# Patient Record
Sex: MALE | Race: WHITE | NOT HISPANIC OR LATINO | Employment: UNEMPLOYED | URBAN - NONMETROPOLITAN AREA
[De-identification: names, ages, dates, MRNs, and addresses within clinical notes are randomized per-mention and may not be internally consistent; named-entity substitution may affect disease eponyms.]

---

## 2017-03-22 ENCOUNTER — HOSPITAL ENCOUNTER (EMERGENCY)
Facility: HOSPITAL | Age: 47
Discharge: HOME | End: 2017-03-22
Attending: EMERGENCY MEDICINE | Admitting: EMERGENCY MEDICINE

## 2017-03-22 ENCOUNTER — APPOINTMENT (OUTPATIENT)
Dept: ULTRASOUND IMAGING | Facility: HOSPITAL | Age: 47
End: 2017-03-22

## 2017-03-22 VITALS
SYSTOLIC BLOOD PRESSURE: 164 MMHG | HEIGHT: 66 IN | OXYGEN SATURATION: 96 % | WEIGHT: 125 LBS | BODY MASS INDEX: 20.09 KG/M2 | HEART RATE: 120 BPM | TEMPERATURE: 97.6 F | DIASTOLIC BLOOD PRESSURE: 98 MMHG | RESPIRATION RATE: 18 BRPM

## 2017-03-22 DIAGNOSIS — R36.9 PENILE DISCHARGE: Primary | ICD-10-CM

## 2017-03-22 DIAGNOSIS — R30.0 DYSURIA: ICD-10-CM

## 2017-03-22 LAB
BACTERIA UR QL AUTO: ABNORMAL /HPF
BILIRUB UR QL STRIP: NEGATIVE
CLARITY UR: CLEAR
COLOR UR: YELLOW
GLUCOSE UR STRIP-MCNC: NEGATIVE MG/DL
HGB UR QL STRIP.AUTO: ABNORMAL
HYALINE CASTS UR QL AUTO: ABNORMAL /LPF
KETONES UR QL STRIP: NEGATIVE
LEUKOCYTE ESTERASE UR QL STRIP.AUTO: ABNORMAL
NITRITE UR QL STRIP: POSITIVE
PH UR STRIP.AUTO: 6 [PH] (ref 5–8)
PROT UR QL STRIP: ABNORMAL
RBC # UR: ABNORMAL /HPF
REF LAB TEST METHOD: ABNORMAL
SP GR UR STRIP: 1.02 (ref 1–1.03)
SQUAMOUS #/AREA URNS HPF: ABNORMAL /HPF
UROBILINOGEN UR QL STRIP: ABNORMAL
WBC UR QL AUTO: ABNORMAL /HPF

## 2017-03-22 PROCEDURE — 25010000002 CEFTRIAXONE PER 250 MG: Performed by: EMERGENCY MEDICINE

## 2017-03-22 PROCEDURE — 87186 SC STD MICRODIL/AGAR DIL: CPT | Performed by: EMERGENCY MEDICINE

## 2017-03-22 PROCEDURE — 81001 URINALYSIS AUTO W/SCOPE: CPT | Performed by: EMERGENCY MEDICINE

## 2017-03-22 PROCEDURE — 87491 CHLMYD TRACH DNA AMP PROBE: CPT | Performed by: EMERGENCY MEDICINE

## 2017-03-22 PROCEDURE — 99283 EMERGENCY DEPT VISIT LOW MDM: CPT

## 2017-03-22 PROCEDURE — 96372 THER/PROPH/DIAG INJ SC/IM: CPT

## 2017-03-22 PROCEDURE — 87591 N.GONORRHOEAE DNA AMP PROB: CPT | Performed by: EMERGENCY MEDICINE

## 2017-03-22 PROCEDURE — 87077 CULTURE AEROBIC IDENTIFY: CPT | Performed by: EMERGENCY MEDICINE

## 2017-03-22 PROCEDURE — 76870 US EXAM SCROTUM: CPT

## 2017-03-22 PROCEDURE — 51701 INSERT BLADDER CATHETER: CPT

## 2017-03-22 PROCEDURE — 87086 URINE CULTURE/COLONY COUNT: CPT | Performed by: EMERGENCY MEDICINE

## 2017-03-22 RX ORDER — LIDOCAINE HYDROCHLORIDE 10 MG/ML
0.9 INJECTION, SOLUTION EPIDURAL; INFILTRATION; INTRACAUDAL; PERINEURAL ONCE
Status: DISCONTINUED | OUTPATIENT
Start: 2017-03-22 | End: 2017-03-22

## 2017-03-22 RX ORDER — CIPROFLOXACIN 500 MG/1
500 TABLET, FILM COATED ORAL 2 TIMES DAILY
Qty: 28 TABLET | Refills: 0 | Status: SHIPPED | OUTPATIENT
Start: 2017-03-22 | End: 2017-04-05

## 2017-03-22 RX ORDER — CEFTRIAXONE SODIUM 250 MG/1
250 INJECTION, POWDER, FOR SOLUTION INTRAMUSCULAR; INTRAVENOUS ONCE
Status: COMPLETED | OUTPATIENT
Start: 2017-03-22 | End: 2017-03-22

## 2017-03-22 RX ORDER — IBUPROFEN 800 MG/1
400 TABLET ORAL EVERY 8 HOURS PRN
Qty: 30 TABLET | Refills: 0 | Status: ON HOLD | OUTPATIENT
Start: 2017-03-22 | End: 2021-04-25

## 2017-03-22 RX ORDER — TRAZODONE HYDROCHLORIDE 100 MG/1
100 TABLET ORAL NIGHTLY
Status: ON HOLD | COMMUNITY
End: 2021-04-25

## 2017-03-22 RX ORDER — AZITHROMYCIN 250 MG/1
1000 TABLET, FILM COATED ORAL ONCE
Status: COMPLETED | OUTPATIENT
Start: 2017-03-22 | End: 2017-03-22

## 2017-03-22 RX ADMIN — AZITHROMYCIN 1000 MG: 250 TABLET, FILM COATED ORAL at 07:33

## 2017-03-22 RX ADMIN — CEFTRIAXONE SODIUM 250 MG: 250 INJECTION, POWDER, FOR SOLUTION INTRAMUSCULAR; INTRAVENOUS at 07:32

## 2017-03-22 NOTE — DISCHARGE INSTRUCTIONS

## 2017-03-22 NOTE — ED PROVIDER NOTES
"Subjective   HPI Comments: 46-year-old male presenting with multiple complaints.  He states for the last few days she's had right testicle pain/swelling, dysuria, penile discharge.  He had a new sexual partner who he thinks has given him gonorrhea.  He denies any fevers, chills, nausea, vomiting.  He has a history of testicular cancer status post left orchiectomy.  He is also requesting that we place a Charles catheter, swab his penis for gonorrhea and \"a prostate test\".      Review of Systems   Constitutional: Negative for chills and fever.   HENT: Negative for congestion, rhinorrhea and sore throat.    Eyes: Negative for pain.   Respiratory: Negative for cough and shortness of breath.    Cardiovascular: Negative for chest pain, palpitations and leg swelling.   Gastrointestinal: Negative for abdominal pain, diarrhea, nausea and vomiting.   Genitourinary: Positive for difficulty urinating, discharge, dysuria, scrotal swelling and testicular pain.   Musculoskeletal: Negative for arthralgias.   Skin: Negative for rash.   Neurological: Negative for weakness and numbness.   Psychiatric/Behavioral: Negative for behavioral problems.       Past Medical History:   Diagnosis Date   • Cancer     testicular       No Known Allergies    Past Surgical History:   Procedure Laterality Date   • TESTICLE SURGERY         History reviewed. No pertinent family history.    Social History     Social History   • Marital status: Single     Spouse name: N/A   • Number of children: N/A   • Years of education: N/A     Social History Main Topics   • Smoking status: Never Smoker   • Smokeless tobacco: Current User     Types: Chew   • Alcohol use No   • Drug use: No   • Sexual activity: Not Asked     Other Topics Concern   • None     Social History Narrative   • None           Objective   Physical Exam   Constitutional: He is oriented to person, place, and time. He appears well-developed and well-nourished. No distress.   HENT:   Head: " Normocephalic and atraumatic.   Right Ear: External ear normal.   Left Ear: External ear normal.   Nose: Nose normal.   Mouth/Throat: Oropharynx is clear and moist.   Eyes: Conjunctivae and EOM are normal. Pupils are equal, round, and reactive to light.   Neck: Normal range of motion. Neck supple.   Cardiovascular: Normal rate, regular rhythm, normal heart sounds and intact distal pulses.    Pulmonary/Chest: Effort normal and breath sounds normal. No respiratory distress.   Abdominal: Soft. Bowel sounds are normal. He exhibits no distension. There is no tenderness. There is no rebound and no guarding.   Genitourinary:   Genitourinary Comments: Scan amount of clear discharge from the penis, no tenderness, left testicle surgically absent, right testicle with mild tenderness, no swelling   Musculoskeletal: Normal range of motion. He exhibits no edema, tenderness or deformity.   Neurological: He is alert and oriented to person, place, and time.   Skin: Skin is warm and dry. No rash noted.   Psychiatric: He has a normal mood and affect. His behavior is normal.   Nursing note and vitals reviewed.      Procedures         ED Course  ED Course                  MDM  Number of Diagnoses or Management Options  Dysuria:   Penile discharge:   Diagnosis management comments: 46-year-old male with multiple  complaints.  Well-developed, well-nourished man in no distress with exam as above.  We'll check urinalysis and ultrasound.  We'll treat prophylactically for Chlamydia/gonorrhea.  He will need follow-up with urology.  Disposition pending ultrasound.  -ua  -u/s  -im/po meds    Ddx; uti, sti, torsion, malignancy, epididymitis     UA with signs of infection to include nitrite positive.  Ultrasound is without acute findings.  He is oriented treated for Chlamydia/gonorrhea as noted above, will place him on Cipro as well for urinary tract infection.  He'll follow-up with his primary doctor and urology.  Return precautions discussed.   He is comfortable with and understanding of plan.        Final diagnoses:   Penile discharge   Dysuria            Clemente Samuels MD  03/22/17 2830

## 2017-03-23 LAB
C TRACH RRNA SPEC DONR QL NAA+PROBE: NEGATIVE
N GONORRHOEA DNA SPEC QL NAA+PROBE: NEGATIVE

## 2017-03-24 LAB — BACTERIA SPEC AEROBE CULT: ABNORMAL

## 2017-04-04 ENCOUNTER — HOSPITAL ENCOUNTER (EMERGENCY)
Facility: HOSPITAL | Age: 47
Discharge: LEFT BEFORE DISCHARGE | End: 2017-04-04
Attending: EMERGENCY MEDICINE | Admitting: EMERGENCY MEDICINE

## 2017-04-04 VITALS
HEART RATE: 107 BPM | WEIGHT: 125 LBS | SYSTOLIC BLOOD PRESSURE: 203 MMHG | HEIGHT: 66 IN | BODY MASS INDEX: 20.09 KG/M2 | RESPIRATION RATE: 16 BRPM | DIASTOLIC BLOOD PRESSURE: 127 MMHG | OXYGEN SATURATION: 97 % | TEMPERATURE: 97.9 F

## 2017-04-04 DIAGNOSIS — R33.9 URINARY RETENTION: Primary | ICD-10-CM

## 2017-04-04 PROCEDURE — 99282 EMERGENCY DEPT VISIT SF MDM: CPT

## 2017-04-04 NOTE — ED NOTES
"During triage pt states \"is there anyone I can talk to about me wanting to be a baby, like wearing dirty diapers and wanting to suck on a bottle?\". Informed pt our behavioral health staff would be available to provide resources for pt.      Nahomy Ellis RN  04/04/17 0904    "

## 2017-04-04 NOTE — ED PROVIDER NOTES
Subjective   History of Present Illness  TRIAGE CHIEF COMPLAINT:   Chief Complaint   Patient presents with   • Urinary Retention         HPI: Stefano Claire   is a 46 y.o. male   who presents to the emergency department complaining of urinary retention.  Patient states this is a chronic issue ever since he had testicular cancer.  He intermittently self catheterizes however ran out of his catheters.  Patient feels bladder fullness and states he does not believe he is completely voiding.  Requests Charles with leg bag and states he will follow-up with his doctor in clinic.  Denies dysuria, hematuria, fever, chills.  Apparently made some comments to the nurse about psychiatric concerns however he was not open about this with me.            Review of Systems   All other systems reviewed and are negative.      Past Medical History:   Diagnosis Date   • Cancer     testicular       No Known Allergies    Past Surgical History:   Procedure Laterality Date   • TESTICLE SURGERY         History reviewed. No pertinent family history.    Social History     Social History   • Marital status: Single     Spouse name: N/A   • Number of children: N/A   • Years of education: N/A     Social History Main Topics   • Smoking status: Never Smoker   • Smokeless tobacco: Current User     Types: Chew   • Alcohol use No   • Drug use: No   • Sexual activity: Not Asked     Other Topics Concern   • None     Social History Narrative           Objective   Physical Exam    CONSTITUTIONAL: Awake, oriented, appears disheveled with bizarre affect but overall non-toxic   HENT: Atraumatic, normocephalic, oral mucosa pink and moist, airway patent. Nares patent without drainage. External ears normal.   EYES: Conjunctiva clear, EOMI, PERRL   NECK: Trachea midline, non-tender, supple   CARDIOVASCULAR: Normal heart rate, Normal rhythm, No murmurs, rubs, gallops   PULMONARY/CHEST: Clear to auscultation, no rhonchi, wheezes, or rales. Symmetrical breath  sounds. Non-tender.   ABDOMINAL: Non-distended, soft, very mild suprapubic tenderness - no rebound or guarding. BS normal.   NEUROLOGIC: Non-focal, moving all four extremities, no gross sensory or motor deficits.   EXTREMITIES: No clubbing, cyanosis, or edema   SKIN: Warm, Dry, No erythema, No rash     No orders to display           EKG:         Procedures         ED Course  ED Course          ED COURSE / MEDICAL DECISION MAKING:   Patient with bizarre affect.  At first requested Charles catheter placement and leg bag but then declined any intervention.  Patient left the ER without instructions or referral information.    DECISION TO DISCHARGE/ADMIT: see ED care timeline       Electronically signed by: Juanito Carrasco MD, 4/4/2017 10:27 AM              [facility]    Final diagnoses:   Urinary retention            Juanito Carrasco MD  04/04/17 1030

## 2017-10-15 ENCOUNTER — HOSPITAL ENCOUNTER (EMERGENCY)
Facility: HOSPITAL | Age: 47
Discharge: HOME | End: 2017-10-15
Attending: EMERGENCY MEDICINE | Admitting: EMERGENCY MEDICINE

## 2017-10-15 VITALS
HEIGHT: 66 IN | WEIGHT: 125 LBS | SYSTOLIC BLOOD PRESSURE: 202 MMHG | HEART RATE: 107 BPM | BODY MASS INDEX: 20.09 KG/M2 | TEMPERATURE: 98.3 F | RESPIRATION RATE: 20 BRPM | OXYGEN SATURATION: 98 % | DIASTOLIC BLOOD PRESSURE: 119 MMHG

## 2017-10-15 DIAGNOSIS — I10 UNCONTROLLED HYPERTENSION: ICD-10-CM

## 2017-10-15 DIAGNOSIS — R32 URINARY INCONTINENCE, UNSPECIFIED TYPE: Primary | ICD-10-CM

## 2017-10-15 PROCEDURE — 99282 EMERGENCY DEPT VISIT SF MDM: CPT

## 2017-10-15 NOTE — ED NOTES
Refused UA. Patient requesting paperwork so that he can leave, unable to reassess vital signs prior to leaving at discharge.      Angel Alatorre RN  10/15/17 5071

## 2017-10-15 NOTE — ED PROVIDER NOTES
Subjective   History of Present Illness  TRIAGE CHIEF COMPLAINT:   Chief Complaint   Patient presents with   • Male  Problem         HPI: Stefano Claire   is a 47 y.o. male   who presents to the emergency department complaining of Urinary incontinence.  Patient states he has suffered from urinary incontinence for years ever since his testicular cancer surgery.  Patient states he is tired of having to wear adult diapers and is requesting indwelling Charles catheter.  He has a poor historian.  States he is followed by urology at  but cannot remember the doctor's name.  PCP is Dr. Keita.  Denies fever, chills, dysuria, hematuria, abdominal pain.           Review of Systems   All other systems reviewed and are negative.      Past Medical History:   Diagnosis Date   • Cancer     testicular       No Known Allergies    Past Surgical History:   Procedure Laterality Date   • TESTICLE SURGERY         History reviewed. No pertinent family history.    Social History     Social History   • Marital status: Single     Spouse name: N/A   • Number of children: N/A   • Years of education: N/A     Social History Main Topics   • Smoking status: Never Smoker   • Smokeless tobacco: Current User     Types: Chew   • Alcohol use No   • Drug use: No   • Sexual activity: Not Asked     Other Topics Concern   • None     Social History Narrative   • None           Objective   Physical Exam    CONSTITUTIONAL: Awake, oriented, appears anxious/jittery but non-toxic   HENT: Atraumatic, normocephalic, oral mucosa pink and moist, airway patent.    EYES: Conjunctiva clear   PULMONARY/CHEST: no respiratory distress or audible wheezing   ABDOMINAL: Non-distended   NEUROLOGIC: Non-focal, moving all four extremities.    SKIN: Dry, No erythema or cyanosis     No orders to display           EKG:         Procedures         ED Course  ED Course          ED COURSE / MEDICAL DECISION MAKING:   Nursing notes reviewed.    In our initial discussion while  taking the history of present illness advised the patient that most likely we would not be initiating permanent indwelling Charles catheter in the emergency department and that this is a decision he should make in conjunction with his urologist because he would need follow-up, catheter care education, and replacements.  Patient is quite anxious and has a bizarre affect.  My concern is that he would be lost to follow-up in would leave the catheter in for to long or end up with subsequent infection and possible urosepsis.  He seems unreliable.  Last time he was in the emergency department with similar complaints 6 months ago he left AMA shortly after arrival.  On this occasion he declined physical exam, urinalysis, and states that if we would not be placing a Charles catheter he was going to leave the emergency department and go home.  Patient was encouraged to follow up with his PCP about uncontrolled hypertension as well as his urologist regarding his concerns about urinary incontinence.     DECISION TO DISCHARGE/ADMIT: see ED care timeline       Electronically signed by: Juanito Carrasco MD, 10/15/2017 7:50 AM              [facility]    Final diagnoses:   Urinary incontinence, unspecified type   Uncontrolled hypertension            Juanito Carrasco MD  10/15/17 0802

## 2017-11-18 ENCOUNTER — HOSPITAL ENCOUNTER (EMERGENCY)
Facility: HOSPITAL | Age: 47
Discharge: HOME | End: 2017-11-18
Attending: EMERGENCY MEDICINE | Admitting: EMERGENCY MEDICINE

## 2017-11-18 VITALS
TEMPERATURE: 97.8 F | BODY MASS INDEX: 20.09 KG/M2 | SYSTOLIC BLOOD PRESSURE: 146 MMHG | DIASTOLIC BLOOD PRESSURE: 97 MMHG | RESPIRATION RATE: 18 BRPM | OXYGEN SATURATION: 97 % | HEIGHT: 66 IN | HEART RATE: 79 BPM | WEIGHT: 125 LBS

## 2017-11-18 DIAGNOSIS — F32.A ANXIETY AND DEPRESSION: Primary | ICD-10-CM

## 2017-11-18 DIAGNOSIS — F41.9 ANXIETY AND DEPRESSION: Primary | ICD-10-CM

## 2017-11-18 LAB
ALBUMIN SERPL-MCNC: 4.6 G/DL (ref 3.5–5)
ALBUMIN/GLOB SERPL: 1.7 G/DL (ref 1–2)
ALP SERPL-CCNC: 113 U/L (ref 38–126)
ALT SERPL W P-5'-P-CCNC: 45 U/L (ref 13–69)
AMPHET+METHAMPHET UR QL: NEGATIVE
AMPHETAMINES UR QL: NEGATIVE
ANION GAP SERPL CALCULATED.3IONS-SCNC: 16.7 MMOL/L
APAP SERPL-MCNC: <10 MCG/ML
AST SERPL-CCNC: 30 U/L (ref 15–46)
BACTERIA UR QL AUTO: ABNORMAL /HPF
BARBITURATES UR QL SCN: NEGATIVE
BASOPHILS # BLD AUTO: 0.07 10*3/MM3 (ref 0–0.2)
BASOPHILS NFR BLD AUTO: 0.8 % (ref 0–2.5)
BENZODIAZ UR QL SCN: NEGATIVE
BILIRUB SERPL-MCNC: 0.2 MG/DL (ref 0.2–1.3)
BILIRUB UR QL STRIP: NEGATIVE
BUN BLD-MCNC: 14 MG/DL (ref 7–20)
BUN/CREAT SERPL: 17.5 (ref 6.3–21.9)
BUPRENORPHINE SERPL-MCNC: NEGATIVE NG/ML
CALCIUM SPEC-SCNC: 9.3 MG/DL (ref 8.4–10.2)
CANNABINOIDS SERPL QL: NEGATIVE
CHLORIDE SERPL-SCNC: 104 MMOL/L (ref 98–107)
CLARITY UR: CLEAR
CO2 SERPL-SCNC: 24 MMOL/L (ref 26–30)
COCAINE UR QL: NEGATIVE
COLOR UR: YELLOW
CREAT BLD-MCNC: 0.8 MG/DL (ref 0.6–1.3)
DEPRECATED RDW RBC AUTO: 42.7 FL (ref 37–54)
EOSINOPHIL # BLD AUTO: 0.02 10*3/MM3 (ref 0–0.7)
EOSINOPHIL NFR BLD AUTO: 0.2 % (ref 0–7)
ERYTHROCYTE [DISTWIDTH] IN BLOOD BY AUTOMATED COUNT: 13.2 % (ref 11.5–14.5)
ETHANOL BLD-MCNC: <10 MG/DL
ETHANOL UR QL: <0.01 %
GFR SERPL CREATININE-BSD FRML MDRD: 105 ML/MIN/1.73
GLOBULIN UR ELPH-MCNC: 2.7 GM/DL
GLUCOSE BLD-MCNC: 113 MG/DL (ref 74–98)
GLUCOSE UR STRIP-MCNC: ABNORMAL MG/DL
HCT VFR BLD AUTO: 41.3 % (ref 42–52)
HGB BLD-MCNC: 13.8 G/DL (ref 14–18)
HGB UR QL STRIP.AUTO: ABNORMAL
HYALINE CASTS UR QL AUTO: ABNORMAL /LPF
IMM GRANULOCYTES # BLD: 0.04 10*3/MM3 (ref 0–0.06)
IMM GRANULOCYTES NFR BLD: 0.5 % (ref 0–0.6)
KETONES UR QL STRIP: NEGATIVE
LEUKOCYTE ESTERASE UR QL STRIP.AUTO: NEGATIVE
LYMPHOCYTES # BLD AUTO: 1.26 10*3/MM3 (ref 0.6–3.4)
LYMPHOCYTES NFR BLD AUTO: 14.5 % (ref 10–50)
MCH RBC QN AUTO: 29.6 PG (ref 27–31)
MCHC RBC AUTO-ENTMCNC: 33.4 G/DL (ref 30–37)
MCV RBC AUTO: 88.6 FL (ref 80–94)
METHADONE UR QL SCN: NEGATIVE
MONOCYTES # BLD AUTO: 0.62 10*3/MM3 (ref 0–0.9)
MONOCYTES NFR BLD AUTO: 7.2 % (ref 0–12)
NEUTROPHILS # BLD AUTO: 6.65 10*3/MM3 (ref 2–6.9)
NEUTROPHILS NFR BLD AUTO: 76.8 % (ref 37–80)
NITRITE UR QL STRIP: NEGATIVE
NRBC BLD MANUAL-RTO: 0 /100 WBC (ref 0–0)
OPIATES UR QL: NEGATIVE
OXYCODONE UR QL SCN: NEGATIVE
PCP UR QL SCN: NEGATIVE
PH UR STRIP.AUTO: 7 [PH] (ref 5–8)
PLATELET # BLD AUTO: 361 10*3/MM3 (ref 130–400)
PMV BLD AUTO: 8.7 FL (ref 6–12)
POTASSIUM BLD-SCNC: 3.7 MMOL/L (ref 3.5–5.1)
PROPOXYPH UR QL: NEGATIVE
PROT SERPL-MCNC: 7.3 G/DL (ref 6.3–8.2)
PROT UR QL STRIP: NEGATIVE
RBC # BLD AUTO: 4.66 10*6/MM3 (ref 4.7–6.1)
RBC # UR: ABNORMAL /HPF
REF LAB TEST METHOD: ABNORMAL
SALICYLATES SERPL-MCNC: <1 MG/DL (ref 2.8–20)
SODIUM BLD-SCNC: 141 MMOL/L (ref 137–145)
SP GR UR STRIP: 1.01 (ref 1–1.03)
SQUAMOUS #/AREA URNS HPF: ABNORMAL /HPF
TRICYCLICS UR QL SCN: NEGATIVE
UROBILINOGEN UR QL STRIP: ABNORMAL
WBC NRBC COR # BLD: 8.66 10*3/MM3 (ref 4.8–10.8)
WBC UR QL AUTO: ABNORMAL /HPF

## 2017-11-18 PROCEDURE — 85025 COMPLETE CBC W/AUTO DIFF WBC: CPT | Performed by: NURSE PRACTITIONER

## 2017-11-18 PROCEDURE — 80307 DRUG TEST PRSMV CHEM ANLYZR: CPT | Performed by: NURSE PRACTITIONER

## 2017-11-18 PROCEDURE — 80053 COMPREHEN METABOLIC PANEL: CPT | Performed by: NURSE PRACTITIONER

## 2017-11-18 PROCEDURE — 99283 EMERGENCY DEPT VISIT LOW MDM: CPT

## 2017-11-18 PROCEDURE — 81001 URINALYSIS AUTO W/SCOPE: CPT | Performed by: NURSE PRACTITIONER

## 2017-11-18 PROCEDURE — 80306 DRUG TEST PRSMV INSTRMNT: CPT | Performed by: NURSE PRACTITIONER

## 2017-11-18 RX ORDER — VENLAFAXINE HYDROCHLORIDE 150 MG/1
150 CAPSULE, EXTENDED RELEASE ORAL DAILY
Status: ON HOLD | COMMUNITY
End: 2021-04-25

## 2017-11-18 NOTE — ED NOTES
Spoke with SAÚL Roach navigator on the phone pertaining to patient, she requested to speak with MD. Transferred call.      Angel Alatorre RN  11/18/17 9332

## 2017-11-18 NOTE — DISCHARGE INSTRUCTIONS
FOLLOW UP WITH THE OUTPATIENT RESOURCES.  PLEASE CALL Monday MORNING FOR ESTABLISHING RELATIONSHIPS WITH THERAPEUTIC .  THANK YOU

## 2017-11-18 NOTE — ED NOTES
"Patient provided with clean socks and brief. States that for several years if not all his life that he has wanted to be treated like a baby, wants to wear a diaper, suck on a bottle and have people change his diaper. When asked if patient thinks that he is a baby or an adult he recognizes that he is an adult. Also recognizes that this behavior is not consistent with his age and actions of an adult. He reports he likes to ask people to treat him like a baby and change his diaper for him because he enjoys being laughed at and enjoys humiliation. He has been living out of his car for several weeks, did live with his parents but he was kicked out when he was accused of taking their \"chemo meds\" Patient defends that he was not doing this, \"I have my own medicine to take\"     He says he has friends that he stays with sometimes, last took a bath 2 days ago and has access to regular meals. 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 He wants help to not feel this way, he has talked to [facility] for several years over this issue but \"they don't help me\"    Parents live in [place].      Angel Alatorre RN  11/18/17 5659    "

## 2017-11-18 NOTE — ED PROVIDER NOTES
"Subjective   HPI Comments: Patient presents with request for psychiatric evaluation for his desire to behave like a baby.  \"I have a dirty diaper on now\".  Patient states he wears diapers for urinary incontinence associated with post operative sequelae from testicular surgery but acknowledges he is voluntarily defecating in the diaper with thoughts of being a baby.  He recognizes this as a psychiatric disruption and is seeking assistance.   He denies suicidal or homicidal thoughts; no hallucinations.     Patient is a 47 y.o. male presenting with mental health disorder.   History provided by:  Patient   used: No    Mental Health Problem   Presenting symptoms: bizarre behavior    Presenting symptoms: no agitation, no hallucinations, no homicidal ideas, no suicidal thoughts, no suicidal threats and no suicide attempt    Degree of incapacity (severity):  Unable to specify  Timing:  Unable to specify  Progression:  Unable to specify  Chronicity:  New  Context: not drug abuse    Relieved by:  Nothing  Worsened by:  Nothing  Associated symptoms: anxiety and poor judgment    Associated symptoms: no abdominal pain, no chest pain and no headaches        Review of Systems   Constitutional: Negative.  Negative for diaphoresis and fever.   HENT: Negative for sore throat.    Eyes: Negative.  Negative for pain and visual disturbance.   Respiratory: Negative for cough, shortness of breath, wheezing and stridor.    Cardiovascular: Negative.  Negative for chest pain.   Gastrointestinal: Negative.  Negative for abdominal pain, diarrhea, nausea and vomiting.   Endocrine: Negative.    Genitourinary: Negative for dysuria.        See HPI.    Claims incontinence as well as urinary retention.  Wants someone to cath him        Musculoskeletal: Negative.  Negative for back pain and neck pain.   Skin: Negative.  Negative for pallor and rash.   Allergic/Immunologic: Negative.    Neurological: Negative.  Negative for syncope " and headaches.   Hematological: Negative.    Psychiatric/Behavioral: Negative for agitation, hallucinations, homicidal ideas and suicidal ideas. The patient is nervous/anxious.    All other systems reviewed and are negative.      Past Medical History:   Diagnosis Date   • Cancer     testicular   • Depression        No Known Allergies    Past Surgical History:   Procedure Laterality Date   • TESTICLE SURGERY     • TESTICLE SURGERY         History reviewed. No pertinent family history.    Social History     Social History   • Marital status: Single     Spouse name: N/A   • Number of children: N/A   • Years of education: N/A     Social History Main Topics   • Smoking status: Never Smoker   • Smokeless tobacco: Current User     Types: Chew   • Alcohol use No   • Drug use: No   • Sexual activity: Not Asked     Other Topics Concern   • None     Social History Narrative           Objective   Physical Exam   Constitutional: He is oriented to person, place, and time.   Thin; unkempt.    HENT:   Head: Normocephalic.   Mouth/Throat: No oropharyngeal exudate.   Eyes: EOM are normal. Pupils are equal, round, and reactive to light. No scleral icterus.   Neck: Normal range of motion. Neck supple.   Cardiovascular: Normal rate and regular rhythm.    Pulmonary/Chest: Effort normal and breath sounds normal. No respiratory distress. He has no wheezes. He has no rales.   Abdominal: Soft. Bowel sounds are normal. He exhibits no distension. There is no rebound and no guarding.   Musculoskeletal: Normal range of motion. He exhibits no edema or tenderness.   Lymphadenopathy:     He has cervical adenopathy.   Neurological: He is alert and oriented to person, place, and time. He exhibits normal muscle tone. Coordination normal.   Skin: Skin is warm and dry. No rash noted. No erythema. No pallor.   Psychiatric:   Flat affect   Nursing note and vitals reviewed.      Procedures         ED Course  ED Course   Comment By Time   Behavioral health  representatives invite the patient to pursue outpatient management and the patient understands and concurs. Jayne Bloom Daniel, APRN 11/18 1304      Recent Results (from the past 24 hour(s))   Urinalysis With / Culture If Indicated - Urine, Clean Catch    Collection Time: 11/18/17 10:38 AM   Result Value Ref Range    Color, UA Yellow Yellow, Straw    Appearance, UA Clear Clear    pH, UA 7.0 5.0 - 8.0    Specific Gravity, UA 1.015 1.005 - 1.030    Glucose,  mg/dL (Trace) (A) Negative    Ketones, UA Negative Negative    Bilirubin, UA Negative Negative    Blood, UA Moderate (2+) (A) Negative    Protein, UA Negative Negative    Leuk Esterase, UA Negative Negative    Nitrite, UA Negative Negative    Urobilinogen, UA 0.2 E.U./dL 0.2 - 1.0 E.U./dL   Urine Drug Screen - Urine, Clean Catch    Collection Time: 11/18/17 10:38 AM   Result Value Ref Range    THC, Screen, Urine Negative Negative    Phencyclidine (PCP), Urine Negative Negative    Cocaine Screen, Urine Negative Negative    Methamphetamine, Urine Negative Negative    Opiate Screen Negative Negative    Amphetamine Screen, Urine Negative Negative    Benzodiazepine Screen, Urine Negative Negative    Tricyclic Antidepressants Screen Negative Negative    Methadone Screen, Urine Negative Negative    Barbiturates Screen, Urine Negative Negative    Oxycodone Screen, Urine Negative Negative    Propoxyphene Screen Negative Negative    Buprenorphine, Screen, Urine Negative Negative   Urinalysis, Microscopic Only - Urine, Clean Catch    Collection Time: 11/18/17 10:38 AM   Result Value Ref Range    RBC, UA 0-2 (A) None Seen /HPF    WBC, UA None Seen None Seen /HPF    Bacteria, UA None Seen None Seen /HPF    Squamous Epithelial Cells, UA 0-2 None Seen, 0-2 /HPF    Hyaline Casts, UA None Seen None Seen /LPF    Methodology Manual Light Microscopy    Comprehensive Metabolic Panel    Collection Time: 11/18/17 10:48 AM   Result Value Ref Range    Glucose 113 (H) 74 - 98 mg/dL     BUN 14 7 - 20 mg/dL    Creatinine 0.80 0.60 - 1.30 mg/dL    Sodium 141 137 - 145 mmol/L    Potassium 3.7 3.5 - 5.1 mmol/L    Chloride 104 98 - 107 mmol/L    CO2 24.0 (L) 26.0 - 30.0 mmol/L    Calcium 9.3 8.4 - 10.2 mg/dL    Total Protein 7.3 6.3 - 8.2 g/dL    Albumin 4.60 3.50 - 5.00 g/dL    ALT (SGPT) 45 13 - 69 U/L    AST (SGOT) 30 15 - 46 U/L    Alkaline Phosphatase 113 38 - 126 U/L    Total Bilirubin 0.2 0.2 - 1.3 mg/dL    eGFR Non African Amer 105 >60 mL/min/1.73    Globulin 2.7 gm/dL    A/G Ratio 1.7 1.0 - 2.0 g/dL    BUN/Creatinine Ratio 17.5 6.3 - 21.9    Anion Gap 16.7 mmol/L   Acetaminophen Level    Collection Time: 11/18/17 10:48 AM   Result Value Ref Range    Acetaminophen <10.0   mcg/mL   Salicylate Level    Collection Time: 11/18/17 10:48 AM   Result Value Ref Range    Salicylate <1.0 (L) 2.8 - 20.0 mg/dL   CBC Auto Differential    Collection Time: 11/18/17 10:48 AM   Result Value Ref Range    WBC 8.66 4.80 - 10.80 10*3/mm3    RBC 4.66 (L) 4.70 - 6.10 10*6/mm3    Hemoglobin 13.8 (L) 14.0 - 18.0 g/dL    Hematocrit 41.3 (L) 42.0 - 52.0 %    MCV 88.6 80.0 - 94.0 fL    MCH 29.6 27.0 - 31.0 pg    MCHC 33.4 30.0 - 37.0 g/dL    RDW 13.2 11.5 - 14.5 %    RDW-SD 42.7 37.0 - 54.0 fl    MPV 8.7 6.0 - 12.0 fL    Platelets 361 130 - 400 10*3/mm3    Neutrophil % 76.8 37.0 - 80.0 %    Lymphocyte % 14.5 10.0 - 50.0 %    Monocyte % 7.2 0.0 - 12.0 %    Eosinophil % 0.2 0.0 - 7.0 %    Basophil % 0.8 0.0 - 2.5 %    Immature Grans % 0.5 0.0 - 0.6 %    Neutrophils, Absolute 6.65 2.00 - 6.90 10*3/mm3    Lymphocytes, Absolute 1.26 0.60 - 3.40 10*3/mm3    Monocytes, Absolute 0.62 0.00 - 0.90 10*3/mm3    Eosinophils, Absolute 0.02 0.00 - 0.70 10*3/mm3    Basophils, Absolute 0.07 0.00 - 0.20 10*3/mm3    Immature Grans, Absolute 0.04 0.00 - 0.06 10*3/mm3    nRBC 0.0 0.0 - 0.0 /100 WBC   Ethanol    Collection Time: 11/18/17 10:48 AM   Result Value Ref Range    Ethanol <10 <=10 mg/dL    Ethanol % <0.010 %     Note: In addition  "to lab results from this visit, the labs listed above may include labs taken at another facility or during a different encounter within the last 24 hours. Please correlate lab times with ED admission and discharge times for further clarification of the services performed during this visit.    No orders to display     Vitals:    11/18/17 0948 11/18/17 0952 11/18/17 1208   BP:  (!) 228/120 146/97   BP Location:  Left arm Left arm   Patient Position:  Sitting Lying   Pulse:  108 79   Resp:  18 18   Temp:  97.6 °F (36.4 °C) 97.8 °F (36.6 °C)   TempSrc:  Oral Oral   SpO2:  97% 97%   Weight: 125 lb (56.7 kg)     Height: 66\" (167.6 cm) 66\" (167.6 cm)      Medications - No data to display  ECG/EMG Results (last 24 hours)     ** No results found for the last 24 hours. **                    [facility]    Final diagnoses:   Anxiety and depression            YUNG Garcia  11/18/17 1308       YUNG Garcia  11/18/17 1308    "

## 2017-11-18 NOTE — ED NOTES
Waiting on behavioral health paperwork for outpatient resources to arrive to ED per Marley Alatorre, JANICE  11/18/17 3156

## 2020-02-03 ENCOUNTER — APPOINTMENT (OUTPATIENT)
Dept: GENERAL RADIOLOGY | Facility: HOSPITAL | Age: 50
End: 2020-02-03

## 2020-02-03 ENCOUNTER — HOSPITAL ENCOUNTER (EMERGENCY)
Facility: HOSPITAL | Age: 50
Discharge: PSYCHIATRIC OR SUBSTANCE USE FACILITY | End: 2020-02-04
Attending: EMERGENCY MEDICINE | Admitting: EMERGENCY MEDICINE

## 2020-02-03 DIAGNOSIS — F19.10 POLYSUBSTANCE ABUSE (HCC): ICD-10-CM

## 2020-02-03 DIAGNOSIS — R45.851 SUICIDAL THOUGHTS: Primary | ICD-10-CM

## 2020-02-03 LAB
ALBUMIN SERPL-MCNC: 4.1 G/DL (ref 3.5–5.2)
ALBUMIN/GLOB SERPL: 1.3 G/DL
ALP SERPL-CCNC: 111 U/L (ref 39–117)
ALT SERPL W P-5'-P-CCNC: 37 U/L (ref 1–41)
ANION GAP SERPL CALCULATED.3IONS-SCNC: 19.1 MMOL/L (ref 5–15)
APAP SERPL-MCNC: <5 MCG/ML (ref 10–30)
AST SERPL-CCNC: 50 U/L (ref 1–40)
BASOPHILS # BLD AUTO: 0.04 10*3/MM3 (ref 0–0.2)
BASOPHILS NFR BLD AUTO: 0.5 % (ref 0–1.5)
BILIRUB SERPL-MCNC: 0.2 MG/DL (ref 0.2–1.2)
BUN BLD-MCNC: 26 MG/DL (ref 6–20)
BUN/CREAT SERPL: 23 (ref 7–25)
CALCIUM SPEC-SCNC: 9.7 MG/DL (ref 8.6–10.5)
CHLORIDE SERPL-SCNC: 90 MMOL/L (ref 98–107)
CO2 SERPL-SCNC: 23.9 MMOL/L (ref 22–29)
CREAT BLD-MCNC: 1.13 MG/DL (ref 0.76–1.27)
DEPRECATED RDW RBC AUTO: 43 FL (ref 37–54)
EOSINOPHIL # BLD AUTO: 0.06 10*3/MM3 (ref 0–0.4)
EOSINOPHIL NFR BLD AUTO: 0.7 % (ref 0.3–6.2)
ERYTHROCYTE [DISTWIDTH] IN BLOOD BY AUTOMATED COUNT: 14.7 % (ref 12.3–15.4)
ETHANOL BLD-MCNC: <10 MG/DL (ref 0–10)
ETHANOL UR QL: <0.01 %
GFR SERPL CREATININE-BSD FRML MDRD: 70 ML/MIN/1.73
GLOBULIN UR ELPH-MCNC: 3.2 GM/DL
GLUCOSE BLD-MCNC: 209 MG/DL (ref 65–99)
HCT VFR BLD AUTO: 37.5 % (ref 37.5–51)
HGB BLD-MCNC: 12.4 G/DL (ref 13–17.7)
IMM GRANULOCYTES # BLD AUTO: 0.03 10*3/MM3 (ref 0–0.05)
IMM GRANULOCYTES NFR BLD AUTO: 0.4 % (ref 0–0.5)
LYMPHOCYTES # BLD AUTO: 1.03 10*3/MM3 (ref 0.7–3.1)
LYMPHOCYTES NFR BLD AUTO: 12.5 % (ref 19.6–45.3)
MCH RBC QN AUTO: 26.9 PG (ref 26.6–33)
MCHC RBC AUTO-ENTMCNC: 33.1 G/DL (ref 31.5–35.7)
MCV RBC AUTO: 81.3 FL (ref 79–97)
MONOCYTES # BLD AUTO: 1 10*3/MM3 (ref 0.1–0.9)
MONOCYTES NFR BLD AUTO: 12.2 % (ref 5–12)
NEUTROPHILS # BLD AUTO: 6.06 10*3/MM3 (ref 1.7–7)
NEUTROPHILS NFR BLD AUTO: 73.7 % (ref 42.7–76)
NRBC BLD AUTO-RTO: 0 /100 WBC (ref 0–0.2)
PLATELET # BLD AUTO: 592 10*3/MM3 (ref 140–450)
PMV BLD AUTO: 8.7 FL (ref 6–12)
POTASSIUM BLD-SCNC: 3 MMOL/L (ref 3.5–5.2)
PROT SERPL-MCNC: 7.3 G/DL (ref 6–8.5)
RBC # BLD AUTO: 4.61 10*6/MM3 (ref 4.14–5.8)
SALICYLATES SERPL-MCNC: 0.5 MG/DL
SODIUM BLD-SCNC: 133 MMOL/L (ref 136–145)
WBC NRBC COR # BLD: 8.22 10*3/MM3 (ref 3.4–10.8)

## 2020-02-03 PROCEDURE — 80307 DRUG TEST PRSMV CHEM ANLYZR: CPT | Performed by: EMERGENCY MEDICINE

## 2020-02-03 PROCEDURE — 93005 ELECTROCARDIOGRAM TRACING: CPT

## 2020-02-03 PROCEDURE — 85025 COMPLETE CBC W/AUTO DIFF WBC: CPT | Performed by: EMERGENCY MEDICINE

## 2020-02-03 PROCEDURE — 71045 X-RAY EXAM CHEST 1 VIEW: CPT

## 2020-02-03 PROCEDURE — 80053 COMPREHEN METABOLIC PANEL: CPT | Performed by: EMERGENCY MEDICINE

## 2020-02-03 PROCEDURE — 99284 EMERGENCY DEPT VISIT MOD MDM: CPT

## 2020-02-03 RX ORDER — SODIUM CHLORIDE 0.9 % (FLUSH) 0.9 %
10 SYRINGE (ML) INJECTION AS NEEDED
Status: DISCONTINUED | OUTPATIENT
Start: 2020-02-03 | End: 2020-02-04 | Stop reason: HOSPADM

## 2020-02-03 RX ADMIN — SODIUM CHLORIDE 1000 ML: 9 INJECTION, SOLUTION INTRAVENOUS at 22:51

## 2020-02-04 VITALS
DIASTOLIC BLOOD PRESSURE: 94 MMHG | SYSTOLIC BLOOD PRESSURE: 148 MMHG | BODY MASS INDEX: 22.21 KG/M2 | HEART RATE: 111 BPM | TEMPERATURE: 98 F | OXYGEN SATURATION: 96 % | RESPIRATION RATE: 18 BRPM | WEIGHT: 138.2 LBS | HEIGHT: 66 IN

## 2020-02-04 LAB
AMPHET+METHAMPHET UR QL: POSITIVE
AMPHETAMINES UR QL: POSITIVE
BARBITURATES UR QL SCN: NEGATIVE
BENZODIAZ UR QL SCN: NEGATIVE
BILIRUB UR QL STRIP: NEGATIVE
BUPRENORPHINE SERPL-MCNC: NEGATIVE NG/ML
CANNABINOIDS SERPL QL: NEGATIVE
CLARITY UR: CLEAR
COCAINE UR QL: NEGATIVE
COLOR UR: YELLOW
GLUCOSE UR STRIP-MCNC: ABNORMAL MG/DL
HGB UR QL STRIP.AUTO: NEGATIVE
KETONES UR QL STRIP: ABNORMAL
LEUKOCYTE ESTERASE UR QL STRIP.AUTO: NEGATIVE
METHADONE UR QL SCN: NEGATIVE
NITRITE UR QL STRIP: NEGATIVE
OPIATES UR QL: NEGATIVE
OXYCODONE UR QL SCN: NEGATIVE
PCP UR QL SCN: NEGATIVE
PH UR STRIP.AUTO: 6.5 [PH] (ref 5–8)
PROPOXYPH UR QL: NEGATIVE
PROT UR QL STRIP: ABNORMAL
SP GR UR STRIP: 1.02 (ref 1–1.03)
TRICYCLICS UR QL SCN: POSITIVE
UROBILINOGEN UR QL STRIP: ABNORMAL

## 2020-02-04 PROCEDURE — 81003 URINALYSIS AUTO W/O SCOPE: CPT | Performed by: EMERGENCY MEDICINE

## 2020-02-04 PROCEDURE — 96360 HYDRATION IV INFUSION INIT: CPT

## 2020-02-04 PROCEDURE — 80306 DRUG TEST PRSMV INSTRMNT: CPT | Performed by: EMERGENCY MEDICINE

## 2020-02-04 RX ADMIN — SODIUM CHLORIDE 1000 ML: 9 INJECTION, SOLUTION INTRAVENOUS at 00:14

## 2020-02-04 NOTE — ED PROVIDER NOTES
Subjective   49-year-old male presents to the ED with chief complaint of suicidal thoughts.  The patient states that he has a history of bipolar disorder and depression and has not been taking his medications as prescribed.  He presents to the ED today requesting evaluation and transfer to formerly [facility] to where he can get some help.  Patient states that he is currently homeless and living with his brother in a tent.  He states that his living situation has increased his depression and thoughts of harming himself.  He states that last night he laid on the train tracks hoping that a train would hit him.  He denies other plan for suicide.  He denies drug or alcohol use.  Denies chest pain or shortness of breath.  No fever chills.  No nausea vomiting diarrhea abdominal pain.  No other complaints at this time.          Review of Systems   Psychiatric/Behavioral: Positive for suicidal ideas.   All other systems reviewed and are negative.      Past Medical History:   Diagnosis Date   • Bipolar 1 disorder (CMS/HCC)    • Cancer (CMS/[facility])     testicular   • Depression        No Known Allergies    Past Surgical History:   Procedure Laterality Date   • TESTICLE SURGERY     • TESTICLE SURGERY         History reviewed. No pertinent family history.    Social History     Socioeconomic History   • Marital status: Single     Spouse name: Not on file   • Number of children: Not on file   • Years of education: Not on file   • Highest education level: Not on file   Tobacco Use   • Smoking status: Never Smoker   • Smokeless tobacco: Current User     Types: Chew   Substance and Sexual Activity   • Alcohol use: No   • Drug use: No           Objective   Physical Exam   Constitutional: He is oriented to person, place, and time. No distress.   Thin.  Disheveled.   HENT:   Head: Normocephalic and atraumatic.   Nose: Nose normal.   Eyes: Pupils are equal, round, and reactive to light. Conjunctivae and EOM are normal.   Cardiovascular:  Regular rhythm and intact distal pulses.   Tachycardia.  Sinus tach on the monitor.   Pulmonary/Chest: Effort normal and breath sounds normal. No respiratory distress.   Abdominal: Soft. He exhibits no distension. There is no tenderness.   Musculoskeletal: He exhibits no edema or deformity.   Neurological: He is alert and oriented to person, place, and time. No cranial nerve deficit. Coordination normal.   Skin: He is not diaphoretic.   Nursing note and vitals reviewed.      Procedures           ED Course  ED Course as of Feb 04 0358   Mon Feb 03, 2020   2226 EKG interpreted by me.  Sinus rhythm.  Rate of 135.  Cardiac.  Nonspecific ST and T wave abnormalities.  Abnormal EKG    [CG]   Tue Feb 04, 2020   0042 Methamphetamine, Ur(!): Positive [CG]   0042 Amphetamine, Urine Qual(!): Positive [CG]   0042 Methamphetamine and amphetamine + UDS explains tachycardia   Tricyclic Antidepressants Screen(!): Positive [CG]      ED Course User Index  [CG] Ashish Campos, DO                                               MDM  49-year-old male presented to the ED for suicidal thoughts.  Was tachycardic on arrival.  IV fluid rehydration was initiated.  UDS was positive for amphetamines and methamphetamines which are likely playing into the patient's tachycardia which did improve from 150 on arrival to 105- 110 after treatment. He was cleared medically at this point. Seen and evaluated by behavioral health with transfer to [facility] for intake evaluation.       Final diagnoses:   Suicidal thoughts   Polysubstance abuse (CMS/HCC)            Ashish Campos DO  02/04/20 0358
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
## 2020-05-31 ENCOUNTER — APPOINTMENT (OUTPATIENT)
Dept: ULTRASOUND IMAGING | Facility: HOSPITAL | Age: 50
End: 2020-05-31

## 2020-05-31 ENCOUNTER — HOSPITAL ENCOUNTER (EMERGENCY)
Facility: HOSPITAL | Age: 50
Discharge: LEFT BEFORE DISCHARGE | End: 2020-05-31
Attending: STUDENT IN AN ORGANIZED HEALTH CARE EDUCATION/TRAINING PROGRAM | Admitting: STUDENT IN AN ORGANIZED HEALTH CARE EDUCATION/TRAINING PROGRAM

## 2020-05-31 VITALS
OXYGEN SATURATION: 97 % | HEART RATE: 122 BPM | WEIGHT: 143.2 LBS | HEIGHT: 66 IN | SYSTOLIC BLOOD PRESSURE: 163 MMHG | BODY MASS INDEX: 23.01 KG/M2 | RESPIRATION RATE: 20 BRPM | TEMPERATURE: 98.2 F | DIASTOLIC BLOOD PRESSURE: 103 MMHG

## 2020-05-31 DIAGNOSIS — N50.811 PAIN IN RIGHT TESTICLE: Primary | ICD-10-CM

## 2020-05-31 LAB
BILIRUB UR QL STRIP: NEGATIVE
CLARITY UR: ABNORMAL
COLOR UR: YELLOW
GLUCOSE UR STRIP-MCNC: NEGATIVE MG/DL
HGB UR QL STRIP.AUTO: NEGATIVE
KETONES UR QL STRIP: ABNORMAL
LEUKOCYTE ESTERASE UR QL STRIP.AUTO: NEGATIVE
NITRITE UR QL STRIP: NEGATIVE
PH UR STRIP.AUTO: 6.5 [PH] (ref 5–8)
PROT UR QL STRIP: ABNORMAL
SP GR UR STRIP: 1.03 (ref 1–1.03)
UROBILINOGEN UR QL STRIP: ABNORMAL

## 2020-05-31 PROCEDURE — 99283 EMERGENCY DEPT VISIT LOW MDM: CPT

## 2020-05-31 PROCEDURE — 76870 US EXAM SCROTUM: CPT

## 2020-05-31 PROCEDURE — 81003 URINALYSIS AUTO W/O SCOPE: CPT | Performed by: STUDENT IN AN ORGANIZED HEALTH CARE EDUCATION/TRAINING PROGRAM

## 2020-08-23 ENCOUNTER — TELEPHONE (OUTPATIENT)
Dept: URGENT CARE | Facility: CLINIC | Age: 50
End: 2020-08-23

## 2020-08-23 DIAGNOSIS — N39.0 URINARY TRACT INFECTION WITHOUT HEMATURIA, SITE UNSPECIFIED: Primary | ICD-10-CM

## 2020-08-23 RX ORDER — SULFAMETHOXAZOLE AND TRIMETHOPRIM 800; 160 MG/1; MG/1
TABLET ORAL
Qty: 14 TABLET | Refills: 0 | OUTPATIENT
Start: 2020-08-23 | End: 2021-02-27

## 2020-09-02 ENCOUNTER — HOSPITAL ENCOUNTER (EMERGENCY)
Facility: HOSPITAL | Age: 50
Discharge: HOME | End: 2020-09-02
Attending: EMERGENCY MEDICINE | Admitting: EMERGENCY MEDICINE

## 2020-09-02 VITALS
SYSTOLIC BLOOD PRESSURE: 159 MMHG | BODY MASS INDEX: 20.89 KG/M2 | HEART RATE: 94 BPM | WEIGHT: 130 LBS | OXYGEN SATURATION: 99 % | HEIGHT: 66 IN | TEMPERATURE: 97 F | DIASTOLIC BLOOD PRESSURE: 105 MMHG | RESPIRATION RATE: 18 BRPM

## 2020-09-02 DIAGNOSIS — F32.A DEPRESSION, UNSPECIFIED DEPRESSION TYPE: Primary | ICD-10-CM

## 2020-09-02 PROCEDURE — 99282 EMERGENCY DEPT VISIT SF MDM: CPT

## 2020-09-02 PROCEDURE — 99283 EMERGENCY DEPT VISIT LOW MDM: CPT

## 2020-09-02 PROCEDURE — 99284 EMERGENCY DEPT VISIT MOD MDM: CPT

## 2020-09-02 NOTE — ED NOTES
Spoke with MD Leija regarding pt, pt has security sitting at bedside. Will hold off on the SI protocol at this time until  speaks with pt.     Phone call to Fiona behavioral health navigator. She will be in to talk to pt.     Sue Olivares RN  09/02/20 7860

## 2020-09-02 NOTE — ED NOTES
Pt told Fiona with  that he is not currently suicidal and wants to go to the [facility]. He has been given multiple resources and the number and intake hours for [facility]. Shakeel spoke with MD Heladio and both agree the pt is safe for DC.     Sue Olivares RN  09/02/20 0416

## 2020-09-02 NOTE — CONSULTS
"Brief note: Therapist received call from JANICE Rogers requesting behavioral consult from MD Leija. Therapist met with patient at bedside. Patient immediately states, \"I was having suicidal thoughts earlier but I'm not anymore. I just need to get to the [facility].\" Patient denies need for full assessment at this time. Patient is adamantly denying HI/SI and is not presenting with acute psychiatric symptoms requiring involuntary petition for inpatient hospitalization. Patient is focused on getting to the [facility]. Therapist provided instructions and contact information on how to reach the [facility]. Therapist also provided patient with community resources and emergency crisis lines. Therapist informed [facility] ED staff JANICE Rogers, MD Leija who are agreeable to plan.     COLUMBIA-SUICIDE SEVERITY RATING SCALE  Psychiatric Inpatient Setting - Discharge Screener    Ask questions that are bold and underlined Discharge   Ask Questions 1 and 2 YES NO   1) Wish to be Dead:   Person endorses thoughts about a wish to be dead or not alive anymore, or wish to fall asleep and not wake up.  While you were here in the hospital, have you wished you were dead or wished you could go to sleep and not wake up?  X   2) Suicidal Thoughts:   General non-specific thoughts of wanting to end one's life/die by suicide, “I've thought about killing myself” without general thoughts of ways to kill oneself/associated methods, intent, or plan.   While you were here in the hospital, have you actually had thoughts about killing yourself?   X   If YES to 2, ask questions 3, 4, 5, and 6.  If NO to 2, go directly to question 6   3) Suicidal Thoughts with Method (without Specific Plan or Intent to Act):   Person endorses thoughts of suicide and has thought of a least one method during the assessment period. This is different than a specific plan with time, place or method details worked out. “I thought about taking an overdose but I never made a " specific plan as to when where or how I would actually do it….and I would never go through with it.”   Have you been thinking about how you might kill yourself?      4) Suicidal Intent (without Specific Plan):   Active suicidal thoughts of killing oneself and patient reports having some intent to act on such thoughts, as opposed to “I have the thoughts but I definitely will not do anything about them.”   Have you had these thoughts and had some intention of acting on them or do you have some intention of acting on them after you leave the hospital?      5) Suicide Intent with Specific Plan:   Thoughts of killing oneself with details of plan fully or partially worked out and person has some intent to carry it out.   Have you started to work out or worked out the details of how to kill yourself either for while you were here in the hospital or for after you leave the hospital? Do you intend to carry out this plan?        6) Suicide Behavior    While you were here in the hospital, have you done anything, started to do anything, or prepared to do anything to end your life?    Examples: Took pills, cut yourself, tried to hang yourself, took out pills but didn't swallow any because you changed your mind or someone took them from you, collected pills, secured a means of obtaining a gun, gave away valuables, wrote a will or suicide note, etc.  X       Fiona Cotton, [facility] 9/2/2020

## 2020-09-02 NOTE — ED PROVIDER NOTES
TRIAGE CHIEF COMPLAINT:     Nursing and triage notes reviewed    Chief Complaint   Patient presents with   • Psychiatric Evaluation      HPI: Stefano Claire is a 49 y.o. male who presents to the emergency department complaining of needing a psychiatric evaluation.  Patient states he has a history of depression and bipolar disorder.  He states he is not been on any medication for some time.  He states his been going through a lot and is been feeling depressed.  He states that he is homeless and he lost his mother recently.  He states that earlier tonight he thought about walking in front of traffic.  He states he does not currently have any suicidal thoughts.  He states he thinks he needs to get back on his medication.    REVIEW OF SYSTEMS: All other systems reviewed and are negative     PAST MEDICAL HISTORY:   Past Medical History:   Diagnosis Date   • Bipolar 1 disorder (CMS/HCC)    • Cancer (CMS/HCC)     testicular   • Depression    • Diabetes mellitus (CMS/HCC)         FAMILY HISTORY:   History reviewed. No pertinent family history.     SOCIAL HISTORY:   Social History     Socioeconomic History   • Marital status: Single     Spouse name: Not on file   • Number of children: Not on file   • Years of education: Not on file   • Highest education level: Not on file   Tobacco Use   • Smoking status: Never Smoker   • Smokeless tobacco: Current User     Types: Chew   Substance and Sexual Activity   • Alcohol use: No   • Drug use: No        SURGICAL HISTORY:   Past Surgical History:   Procedure Laterality Date   • TESTICLE SURGERY     • TESTICLE SURGERY          CURRENT MEDICATIONS:      Medication List      ASK your doctor about these medications    ibuprofen 800 MG tablet  Commonly known as:  ADVIL,MOTRIN  Take 0.5 tablets by mouth Every 8 (Eight) Hours As Needed for Mild Pain   (1-3).     QUEtiapine 100 MG tablet  Commonly known as:  SEROquel     sulfamethoxazole-trimethoprim 800-160 MG per tablet  Commonly known  as:  BACTRIM DS,SEPTRA DS  1 po bid     traZODone 100 MG tablet  Commonly known as:  DESYREL     venlafaxine  MG 24 hr capsule  Commonly known as:  EFFEXOR-XR           ALLERGIES: Patient has no known allergies.     PHYSICAL EXAM:   VITAL SIGNS:   Vitals:    09/02/20 0312   BP: (!) 168/123   Pulse: 106   Resp: 18   Temp: 97 °F (36.1 °C)   SpO2: 96%      CONSTITUTIONAL: Awake, oriented, appears non-toxic, mildly depressed mood, good eye contact, answers all questions appropriately  HENT: Atraumatic, normocephalic, oral mucosa pink and moist, airway patent. Nares patent without drainage. External ears normal.   EYES: Conjunctiva clear   NECK: Trachea midline   CARDIOVASCULAR: Normal heart rate, Normal rhythm, No murmurs, rubs, gallops   PULMONARY/CHEST: Clear to auscultation, no rhonchi, wheezes, or rales. Symmetrical breath sounds.  ABDOMINAL: Non-distended, soft, non-tender - no rebound or guarding.  NEUROLOGIC: Non-focal, moving all four extremities, no gross sensory or motor deficits.   EXTREMITIES: No clubbing, cyanosis, or edema   SKIN: Warm, Dry, No erythema, No rash     ED COURSE / MEDICAL DECISION MAKING:   Stefano Claire is a 49 y.o. male who presents to the emergency department for evaluation of depression.  Patient has mildly depressed mood on arrival but appears nontoxic has good eye contact and good insight.  Patient states he does not want to die and currently denies any suicidal thoughts.  He states he has had them before and and states his had a suicide attempt in the past.  Will have our behavioral health therapist evaluate patient and see if he would be a candidate for inpatient evaluation.    After behavioral health evaluation it was felt that patient would not require inpatient therapy.  Patient also did not currently want inpatient therapy.  He denied suicidal ideation to her and gave a similar inconsistent story as he had given to myself.  Will discharge patient with outpatient  resources.    DECISION TO DISCHARGE/ADMIT: see ED care timeline     FINAL IMPRESSION:   1 --depression  2 --   3 --     Electronically signed by: Vonda Leija MD, 9/2/2020 03:29       Vonda Leija MD  09/02/20 0418

## 2020-09-02 NOTE — ED NOTES
"Pt states \"if I can get to the [facility] I think I'll be alright\". I explained the [facility] does not do intakes in the middle of the night currently.     Sue Olivares RN  09/02/20 0336    "

## 2020-11-28 ENCOUNTER — APPOINTMENT (OUTPATIENT)
Dept: ULTRASOUND IMAGING | Facility: HOSPITAL | Age: 50
End: 2020-11-28

## 2020-11-28 ENCOUNTER — HOSPITAL ENCOUNTER (EMERGENCY)
Facility: HOSPITAL | Age: 50
Discharge: HOME | End: 2020-11-29
Attending: EMERGENCY MEDICINE | Admitting: EMERGENCY MEDICINE

## 2020-11-28 VITALS
HEART RATE: 110 BPM | RESPIRATION RATE: 20 BRPM | SYSTOLIC BLOOD PRESSURE: 185 MMHG | OXYGEN SATURATION: 97 % | BODY MASS INDEX: 20.79 KG/M2 | TEMPERATURE: 98 F | WEIGHT: 129.4 LBS | DIASTOLIC BLOOD PRESSURE: 120 MMHG | HEIGHT: 66 IN

## 2020-11-28 DIAGNOSIS — K62.89 RECTAL PAIN: ICD-10-CM

## 2020-11-28 DIAGNOSIS — N50.811 PAIN IN RIGHT TESTICLE: Primary | ICD-10-CM

## 2020-11-28 LAB
ALBUMIN SERPL-MCNC: 4.6 G/DL (ref 3.5–5.2)
ALBUMIN/GLOB SERPL: 1.6 G/DL
ALP SERPL-CCNC: 108 U/L (ref 39–117)
ALT SERPL W P-5'-P-CCNC: 39 U/L (ref 1–41)
ANION GAP SERPL CALCULATED.3IONS-SCNC: 13.8 MMOL/L (ref 5–15)
AST SERPL-CCNC: 76 U/L (ref 1–40)
BASOPHILS # BLD AUTO: 0.06 10*3/MM3 (ref 0–0.2)
BASOPHILS NFR BLD AUTO: 0.8 % (ref 0–1.5)
BILIRUB SERPL-MCNC: 0.3 MG/DL (ref 0–1.2)
BILIRUB UR QL STRIP: NEGATIVE
BUN SERPL-MCNC: 20 MG/DL (ref 6–20)
BUN/CREAT SERPL: 20 (ref 7–25)
CALCIUM SPEC-SCNC: 9.2 MG/DL (ref 8.6–10.5)
CHLORIDE SERPL-SCNC: 98 MMOL/L (ref 98–107)
CLARITY UR: CLEAR
CO2 SERPL-SCNC: 24.2 MMOL/L (ref 22–29)
COLOR UR: YELLOW
CREAT SERPL-MCNC: 1 MG/DL (ref 0.76–1.27)
DEPRECATED RDW RBC AUTO: 46.8 FL (ref 37–54)
EOSINOPHIL # BLD AUTO: 0.04 10*3/MM3 (ref 0–0.4)
EOSINOPHIL NFR BLD AUTO: 0.5 % (ref 0.3–6.2)
ERYTHROCYTE [DISTWIDTH] IN BLOOD BY AUTOMATED COUNT: 14.6 % (ref 12.3–15.4)
GFR SERPL CREATININE-BSD FRML MDRD: 80 ML/MIN/1.73
GLOBULIN UR ELPH-MCNC: 2.8 GM/DL
GLUCOSE SERPL-MCNC: 132 MG/DL (ref 65–99)
GLUCOSE UR STRIP-MCNC: ABNORMAL MG/DL
HCT VFR BLD AUTO: 43.7 % (ref 37.5–51)
HGB BLD-MCNC: 14.4 G/DL (ref 13–17.7)
HGB UR QL STRIP.AUTO: NEGATIVE
IMM GRANULOCYTES # BLD AUTO: 0.02 10*3/MM3 (ref 0–0.05)
IMM GRANULOCYTES NFR BLD AUTO: 0.3 % (ref 0–0.5)
KETONES UR QL STRIP: NEGATIVE
LEUKOCYTE ESTERASE UR QL STRIP.AUTO: NEGATIVE
LYMPHOCYTES # BLD AUTO: 0.95 10*3/MM3 (ref 0.7–3.1)
LYMPHOCYTES NFR BLD AUTO: 12.8 % (ref 19.6–45.3)
MCH RBC QN AUTO: 28.5 PG (ref 26.6–33)
MCHC RBC AUTO-ENTMCNC: 33 G/DL (ref 31.5–35.7)
MCV RBC AUTO: 86.4 FL (ref 79–97)
MONOCYTES # BLD AUTO: 0.77 10*3/MM3 (ref 0.1–0.9)
MONOCYTES NFR BLD AUTO: 10.4 % (ref 5–12)
NEUTROPHILS NFR BLD AUTO: 5.59 10*3/MM3 (ref 1.7–7)
NEUTROPHILS NFR BLD AUTO: 75.2 % (ref 42.7–76)
NITRITE UR QL STRIP: NEGATIVE
NRBC BLD AUTO-RTO: 0 /100 WBC (ref 0–0.2)
PH UR STRIP.AUTO: 7 [PH] (ref 5–8)
PLATELET # BLD AUTO: 406 10*3/MM3 (ref 140–450)
PMV BLD AUTO: 8.5 FL (ref 6–12)
POTASSIUM SERPL-SCNC: 3.6 MMOL/L (ref 3.5–5.2)
PROT SERPL-MCNC: 7.4 G/DL (ref 6–8.5)
PROT UR QL STRIP: NEGATIVE
RBC # BLD AUTO: 5.06 10*6/MM3 (ref 4.14–5.8)
SODIUM SERPL-SCNC: 136 MMOL/L (ref 136–145)
SP GR UR STRIP: 1.02 (ref 1–1.03)
UROBILINOGEN UR QL STRIP: ABNORMAL
WBC # BLD AUTO: 7.43 10*3/MM3 (ref 3.4–10.8)

## 2020-11-28 PROCEDURE — 85025 COMPLETE CBC W/AUTO DIFF WBC: CPT | Performed by: PHYSICIAN ASSISTANT

## 2020-11-28 PROCEDURE — 81003 URINALYSIS AUTO W/O SCOPE: CPT | Performed by: PHYSICIAN ASSISTANT

## 2020-11-28 PROCEDURE — 99283 EMERGENCY DEPT VISIT LOW MDM: CPT

## 2020-11-28 PROCEDURE — 80053 COMPREHEN METABOLIC PANEL: CPT | Performed by: PHYSICIAN ASSISTANT

## 2020-11-28 PROCEDURE — 76870 US EXAM SCROTUM: CPT

## 2020-11-28 RX ORDER — SODIUM CHLORIDE 0.9 % (FLUSH) 0.9 %
10 SYRINGE (ML) INJECTION AS NEEDED
Status: DISCONTINUED | OUTPATIENT
Start: 2020-11-28 | End: 2020-11-29 | Stop reason: HOSPADM

## 2020-11-28 RX ADMIN — SODIUM CHLORIDE 1000 ML: 9 INJECTION, SOLUTION INTRAVENOUS at 21:32

## 2021-01-31 ENCOUNTER — HOSPITAL ENCOUNTER (EMERGENCY)
Facility: HOSPITAL | Age: 51
Discharge: HOME | End: 2021-01-31
Attending: EMERGENCY MEDICINE
Payer: COMMERCIAL

## 2021-01-31 VITALS
RESPIRATION RATE: 30 BRPM | HEART RATE: 99 BPM | WEIGHT: 125 LBS | BODY MASS INDEX: 20.09 KG/M2 | OXYGEN SATURATION: 99 % | SYSTOLIC BLOOD PRESSURE: 165 MMHG | DIASTOLIC BLOOD PRESSURE: 106 MMHG | TEMPERATURE: 97.9 F | HEIGHT: 66 IN

## 2021-01-31 DIAGNOSIS — I10 HYPERTENSION, UNSPECIFIED TYPE: ICD-10-CM

## 2021-01-31 DIAGNOSIS — F15.10 METHAMPHETAMINE ABUSE (HCC): ICD-10-CM

## 2021-01-31 DIAGNOSIS — R39.15 URINARY URGENCY: ICD-10-CM

## 2021-01-31 DIAGNOSIS — R33.9 URINARY RETENTION: Primary | ICD-10-CM

## 2021-01-31 LAB
A/G RATIO: 1.5 (ref 0.8–2)
ALBUMIN SERPL-MCNC: 4.4 G/DL (ref 3.4–4.8)
ALP BLD-CCNC: 109 U/L (ref 25–100)
ALT SERPL-CCNC: 62 U/L (ref 4–36)
AMORPHOUS: ABNORMAL /HPF
AMPHETAMINE SCREEN, URINE: ABNORMAL
ANION GAP SERPL CALCULATED.3IONS-SCNC: 13 MMOL/L (ref 3–16)
AST SERPL-CCNC: 66 U/L (ref 8–33)
BARBITURATE SCREEN URINE: ABNORMAL
BASOPHILS ABSOLUTE: 0.1 K/UL (ref 0–0.1)
BASOPHILS RELATIVE PERCENT: 0.7 %
BENZODIAZEPINE SCREEN, URINE: ABNORMAL
BILIRUB SERPL-MCNC: <0.2 MG/DL (ref 0.3–1.2)
BILIRUBIN URINE: NEGATIVE
BLOOD, URINE: ABNORMAL
BUN BLDV-MCNC: 18 MG/DL (ref 6–20)
CALCIUM SERPL-MCNC: 9.8 MG/DL (ref 8.5–10.5)
CANNABINOID SCREEN URINE: ABNORMAL
CHLORIDE BLD-SCNC: 98 MMOL/L (ref 98–107)
CLARITY: CLEAR
CO2: 25 MMOL/L (ref 20–30)
COCAINE METABOLITE SCREEN URINE: ABNORMAL
COLOR: YELLOW
CREAT SERPL-MCNC: 1 MG/DL (ref 0.4–1.2)
EOSINOPHILS ABSOLUTE: 0 K/UL (ref 0–0.4)
EOSINOPHILS RELATIVE PERCENT: 0.3 %
EPITHELIAL CELLS, UA: ABNORMAL /HPF (ref 0–5)
GFR AFRICAN AMERICAN: >59
GFR NON-AFRICAN AMERICAN: >59
GLOBULIN: 2.9 G/DL
GLUCOSE BLD-MCNC: 115 MG/DL (ref 74–106)
GLUCOSE URINE: 100 MG/DL
HCT VFR BLD CALC: 45.7 % (ref 40–54)
HEMOGLOBIN: 14.7 G/DL (ref 13–18)
IMMATURE GRANULOCYTES #: 0 K/UL
IMMATURE GRANULOCYTES %: 0.1 % (ref 0–5)
KETONES, URINE: NEGATIVE MG/DL
LEUKOCYTE ESTERASE, URINE: NEGATIVE
LIPASE: 87 U/L (ref 5.6–51.3)
LYMPHOCYTES ABSOLUTE: 0.9 K/UL (ref 1.5–4)
LYMPHOCYTES RELATIVE PERCENT: 13.2 %
Lab: ABNORMAL
MCH RBC QN AUTO: 29.2 PG (ref 27–32)
MCHC RBC AUTO-ENTMCNC: 32.2 G/DL (ref 31–35)
MCV RBC AUTO: 90.7 FL (ref 80–100)
METHADONE SCREEN, URINE: ABNORMAL
METHAMPHETAMINE, URINE: POSITIVE
MICROSCOPIC EXAMINATION: YES
MONOCYTES ABSOLUTE: 0.4 K/UL (ref 0.2–0.8)
MONOCYTES RELATIVE PERCENT: 6.2 %
MUCUS: ABNORMAL /LPF
NEUTROPHILS ABSOLUTE: 5.5 K/UL (ref 2–7.5)
NEUTROPHILS RELATIVE PERCENT: 79.5 %
NITRITE, URINE: NEGATIVE
OPIATE SCREEN URINE: ABNORMAL
PDW BLD-RTO: 14.2 % (ref 11–16)
PH UA: 7.5 (ref 5–8)
PHENCYCLIDINE SCREEN URINE: ABNORMAL
PLATELET # BLD: 376 K/UL (ref 150–400)
PMV BLD AUTO: 8.6 FL (ref 6–10)
POTASSIUM REFLEX MAGNESIUM: 4.2 MMOL/L (ref 3.4–5.1)
PRO-BNP: 182 PG/ML (ref 0–900)
PROPOXYPHENE SCREEN, URINE: ABNORMAL
PROTEIN UA: NEGATIVE MG/DL
RBC # BLD: 5.04 M/UL (ref 4.5–6)
RBC UA: ABNORMAL /HPF (ref 0–4)
SODIUM BLD-SCNC: 136 MMOL/L (ref 136–145)
SPECIFIC GRAVITY UA: 1.02 (ref 1–1.03)
TOTAL PROTEIN: 7.3 G/DL (ref 6.4–8.3)
TRICYCLIC, URINE: POSITIVE
TROPONIN: <0.3 NG/ML
UR OXYCODONE RAPID SCREEN: ABNORMAL
URINE REFLEX TO CULTURE: ABNORMAL
URINE TYPE: ABNORMAL
UROBILINOGEN, URINE: 0.2 E.U./DL
WBC # BLD: 6.9 K/UL (ref 4–11)
WBC UA: ABNORMAL /HPF (ref 0–5)

## 2021-01-31 PROCEDURE — 80307 DRUG TEST PRSMV CHEM ANLYZR: CPT

## 2021-01-31 PROCEDURE — 83690 ASSAY OF LIPASE: CPT

## 2021-01-31 PROCEDURE — 36415 COLL VENOUS BLD VENIPUNCTURE: CPT

## 2021-01-31 PROCEDURE — 80053 COMPREHEN METABOLIC PANEL: CPT

## 2021-01-31 PROCEDURE — 85025 COMPLETE CBC W/AUTO DIFF WBC: CPT

## 2021-01-31 PROCEDURE — 84484 ASSAY OF TROPONIN QUANT: CPT

## 2021-01-31 PROCEDURE — 99283 EMERGENCY DEPT VISIT LOW MDM: CPT

## 2021-01-31 PROCEDURE — 51702 INSERT TEMP BLADDER CATH: CPT

## 2021-01-31 PROCEDURE — 83880 ASSAY OF NATRIURETIC PEPTIDE: CPT

## 2021-01-31 PROCEDURE — 6370000000 HC RX 637 (ALT 250 FOR IP): Performed by: EMERGENCY MEDICINE

## 2021-01-31 PROCEDURE — 81001 URINALYSIS AUTO W/SCOPE: CPT

## 2021-01-31 PROCEDURE — 93005 ELECTROCARDIOGRAM TRACING: CPT

## 2021-01-31 RX ORDER — CLONIDINE HYDROCHLORIDE 0.1 MG/1
0.2 TABLET ORAL ONCE
Status: COMPLETED | OUTPATIENT
Start: 2021-01-31 | End: 2021-01-31

## 2021-01-31 RX ORDER — PHENAZOPYRIDINE HYDROCHLORIDE 95 MG/1
200 TABLET ORAL ONCE
Status: COMPLETED | OUTPATIENT
Start: 2021-01-31 | End: 2021-01-31

## 2021-01-31 RX ORDER — CLONIDINE HYDROCHLORIDE 0.1 MG/1
0.1 TABLET ORAL ONCE
Status: COMPLETED | OUTPATIENT
Start: 2021-01-31 | End: 2021-01-31

## 2021-01-31 RX ORDER — QUETIAPINE FUMARATE 200 MG/1
200 TABLET, FILM COATED ORAL DAILY
COMMUNITY

## 2021-01-31 RX ORDER — LABETALOL HYDROCHLORIDE 5 MG/ML
10 INJECTION, SOLUTION INTRAVENOUS ONCE
Status: DISCONTINUED | OUTPATIENT
Start: 2021-01-31 | End: 2021-01-31 | Stop reason: HOSPADM

## 2021-01-31 RX ADMIN — CLONIDINE HYDROCHLORIDE 0.1 MG: 0.1 TABLET ORAL at 01:06

## 2021-01-31 RX ADMIN — CLONIDINE HYDROCHLORIDE 0.2 MG: 0.1 TABLET ORAL at 02:25

## 2021-01-31 RX ADMIN — Medication 190 MG: at 01:06

## 2021-01-31 ASSESSMENT — PAIN SCALES - GENERAL: PAINLEVEL_OUTOF10: 6

## 2021-01-31 ASSESSMENT — PAIN DESCRIPTION - LOCATION: LOCATION: GROIN

## 2021-01-31 ASSESSMENT — PAIN DESCRIPTION - PAIN TYPE: TYPE: ACUTE PAIN

## 2021-01-31 NOTE — ED PROVIDER NOTES
[address] ENCOUNTER      Pt Name: Tanisha Bangura  MRN: [id number]  [place]: 1970  Date of evaluation: 1/31/2021  Provider: Arpan Neumann MD    CHIEF COMPLAINT       Chief Complaint   Patient presents with    Dysuria    Groin Pain         HISTORY OF PRESENT ILLNESS  (Location/Symptom, Timing/Onset, Context/Setting, Quality, Duration, Modifying Factors, Severity.)   Tanisha Bangura is a 52 y.o. male who presents to the emergency department with concerns of urinary retention, urinary urgency and discomfort. Patient states that 10 years ago he had left testicle removed secondary to testicular cancer at [facility]. Patient states that he has not done any follow-up since. Patient states that he has to wear a diaper because he is unable to control his urine. Patient states that the last time he had urinary retention was a few months ago when he required insertion of a Harper catheter to relieve his urinary retention. Patient states that he never followed up since his last occurrence several months ago. Patient denies any chest pain shortness of breath. He states that he has minimal discomfort to lower abdomen but states it is secondary to urinary retention. Patient denies any trauma or injury. Patient denies any fever. Patient is resting comfortably in the room in no acute distress conversing in full sentences. Patient states that he used methamphetamines in the past but has run out of money and has not been able to get any recently. Patient has been seen in the past for methamphetamine use, tachycardia, urinary retention and hypertension      Nursing notes were reviewed.     REVIEW OFSYSTEMS    (2-9 systems for level 4, 10 or more for level 5)   ROS:  General:  No fevers, no chills, no weakness  Cardiovascular:  No chest pain, no palpitations  Respiratory:  No shortness of breath, no cough, no wheezing  Gastrointestinal: Mild lower abdominal pain organizations: None     Relationship status: None    Intimate partner violence     Fear of current or ex partner: None     Emotionally abused: None     Physically abused: None     Forced sexual activity: None   Other Topics Concern    None   Social History Narrative    None         PHYSICAL EXAM    (up to 7 for level 4, 8 or more for level 5)     ED Triage Vitals   BP Temp Temp src Pulse Resp SpO2 Height Weight   -- -- -- -- -- -- -- --       Physical Exam  General :Patient is awake, alert, oriented, in no acute distress, nontoxic appearing  HEENT: Pupils are equally round and reactive to light, EOMI, conjunctivae clear. Oral mucosa is moist, no exudate. Uvula is midline  Neck: Neck is supple, full range of motion, trachea midline  Cardiac: Heart regular rate, rhythm, no murmurs, rubs, or gallops  Lungs: Lungs are clear to auscultation, there is no wheezing, rhonchi, or rales. There is no use of accessory muscles. Chest wall: There is no tenderness to palpation over the chest wall or over ribs  Abdomen: Abdomen is soft, minimal hypogastric discomfort on palpation, nondistended. There is no firm or pulsatile masses, no rebound rigidity or guarding. Genitourinary= left testicle absent from previous surgery 10 years ago. No inguinal hernia noted. No cellulitis or ulcer noted. No discharge noted at meatus. Cremasteric reflex intact  Musculoskeletal: 5 out of 5 strength in all 4 extremities. No focal muscle deficits are appreciated  Neuro:  Motor intact, sensory intact, level of consciousness is normal, cerebellar function is normal, reflexes are grossly normal. No evidence of incontinence or loss of bowel or bladder function, no saddle anesthesia noted   Dermatology: Skin is warm and dry  Psych: Mentation is grossly normal, cognition is grossly normal.  Mildly anxious      DIAGNOSTIC RESULTS     EKG: All EKG's are interpreted by the Emergency Department Physician who either signs or Co-signs this chart in the absenceof a cardiologist.    The EKG interpreted by me shows sinus tachycardia rate of 123 per EDMD    RADIOLOGY:   Non-plain film images such as CT, Ultrasound and MRI are read by the radiologist. Plain radiographic images are visualized and preliminarily interpreted by the emergency physician with the below findings:      ? Radiologist's Report Reviewed:  No orders to display         ED BEDSIDE ULTRASOUND:   Performed by ED Physician - none    LABS:    I have reviewed and interpreted all of the currently available lab results from this visit (ifapplicable):  Results for orders placed or performed during the hospital encounter of 01/31/21   Urine Reflex to Culture    Specimen: Urine, clean catch   Result Value Ref Range    Color, UA Yellow Straw/Yellow    Clarity, UA Clear Clear    Glucose, Ur 100 (A) Negative mg/dL    Bilirubin Urine Negative Negative    Ketones, Urine Negative Negative mg/dL    Specific Gravity, UA 1.020 1.005 - 1.030    Blood, Urine TRACE-INTACT (A) Negative    pH, UA 7.5 5.0 - 8.0    Protein, UA Negative Negative mg/dL    Urobilinogen, Urine 0.2 <2.0 E.U./dL    Nitrite, Urine Negative Negative    Leukocyte Esterase, Urine Negative Negative    Microscopic Examination YES     Urine Type NotGiven     Urine Reflex to Culture Not Indicated    Microscopic Urinalysis   Result Value Ref Range    Mucus, UA Rare (A) None Seen /LPF    WBC, UA 0-2 0 - 5 /HPF    RBC, UA 3-4 0 - 4 /HPF    Epithelial Cells, UA 0-1 0 - 5 /HPF    Amorphous, UA 3+ /HPF   CBC Auto Differential   Result Value Ref Range    WBC 6.9 4.0 - 11.0 K/uL    RBC 5.04 4.50 - 6.00 M/uL    Hemoglobin 14.7 13.0 - 18.0 g/dL    Hematocrit 45.7 40.0 - 54.0 %    MCV 90.7 80.0 - 100.0 fL    MCH 29.2 27.0 - 32.0 pg    MCHC 32.2 31.0 - 35.0 g/dL    RDW 14.2 11.0 - 16.0 %    Platelets 282 364 - 748 K/uL    MPV 8.6 6.0 - 10.0 fL    Neutrophils % 79.5 %    Immature Granulocytes % 0.1 0.0 - 5.0 %    Lymphocytes % 13.2 %    Monocytes % 6.2 %    Eosinophils % 0.3 %    Basophils % 0.7 %    Neutrophils Absolute 5.5 2.0 - 7.5 K/uL    Immature Granulocytes # 0.0 K/uL    Lymphocytes Absolute 0.9 (L) 1.5 - 4.0 K/uL    Monocytes Absolute 0.4 0.2 - 0.8 K/uL    Eosinophils Absolute 0.0 0.0 - 0.4 K/uL    Basophils Absolute 0.1 0.0 - 0.1 K/uL   Comprehensive Metabolic Panel w/ Reflex to MG   Result Value Ref Range    Sodium 136 136 - 145 mmol/L    Potassium reflex Magnesium 4.2 3.4 - 5.1 mmol/L    Chloride 98 98 - 107 mmol/L    CO2 25 20 - 30 mmol/L    Anion Gap 13 3 - 16    Glucose 115 (H) 74 - 106 mg/dL    BUN 18 6 - 20 mg/dL    CREATININE 1.0 0.4 - 1.2 mg/dL    GFR Non-African American >59 >59    GFR African American >59 >59    Calcium 9.8 8.5 - 10.5 mg/dL    Total Protein 7.3 6.4 - 8.3 g/dL    Albumin 4.4 3.4 - 4.8 g/dL    Albumin/Globulin Ratio 1.5 0.8 - 2.0    Total Bilirubin <0.2 (L) 0.3 - 1.2 mg/dL    Alkaline Phosphatase 109 (H) 25 - 100 U/L    ALT 62 (H) 4 - 36 U/L    AST 66 (H) 8 - 33 U/L    Globulin 2.9 g/dL   Lipase   Result Value Ref Range    Lipase 87.0 (H) 5.6 - 51.3 U/L   Drug Screen, Multiple, Urine   Result Value Ref Range    PCP Screen, Urine Neg Negative <25 ng/mL    Benzodiazepine Screen, Urine Neg Negative <300 ng/mL    Cocaine Metabolite Screen, Urine Neg Negative <300 ng/mL    Amphetamine Screen, Urine Neg Negative <1000 ng/mL    Cannabinoid Scrn, Ur Neg Negative <50 ng/mL    Opiate Scrn, Ur Neg Negative <300 ng/mL    Barbiturate Screen, Ur Neg Negative <368 ng/mL    Tricyclic POSITIVE (A) Negative <300 ng/mL    Methadone Screen, Urine Neg Negative <300 ng/ml    Propoxyphene Screen, Urine Neg Negative <300 ng/mL    Methamphetamine, Urine POSITIVE (A) Negative <1000 ng/mL    UR Oxycodone Rapid Screen Neg Negative <100 ng/mL    Drug Screen Comment: see below    Brain Natriuretic Peptide   Result Value Ref Range    Pro- 0 - 900 pg/mL   Troponin   Result Value Ref Range    Troponin <0.30 <0.30 ng/mL        All other labs were within normal range or not returned as of this dictation. EMERGENCY DEPARTMENT COURSE and DIFFERENTIAL DIAGNOSIS/MDM:   Vitals:    Vitals:    01/31/21 0430 01/31/21 0445 01/31/21 0500 01/31/21 0515   BP: (!) 165/112 (!) 175/118 (!) 159/115 (!) 165/106   Pulse: 110 105 102 99   Resp: 30 28 30    Temp:       TempSrc:       SpO2: 100% 100% 98% 99%   Weight:       Height:           MEDICATIONS ADMINISTERED IN ED:  Medications   labetalol (NORMODYNE;TRANDATE) injection 10 mg (10 mg Intravenous Not Given 1/31/21 0225)   phenazopyridine (PYRIDIUM) tablet 190 mg (190 mg Oral Given 1/31/21 0106)   cloNIDine (CATAPRES) tablet 0.1 mg (0.1 mg Oral Given 1/31/21 0106)   cloNIDine (CATAPRES) tablet 0.2 mg (0.2 mg Oral Given 1/31/21 0225)       Patient was placed examination room at which time after exam was obtained a Harper catheter was placed. It was noted that patient's blood pressure was 190/123 and the patient was given clonidine 0.1 mg p.o. Patient was given Pyridium 200 mg p.o. EKG revealed sinus tachycardia rate 123 per EDMD.  Patient's clonidine was repeated with 0.2 mg p.o. while awaiting labs and UDS. Review of patient's labs reveal UDS positive for methamphetamines and tricyclic's. CBC was within normal limits along with normal CMP. Glucose was 115. Troponin and BNP were within normal limits. Patient was observed for improvement of blood pressure and heart rate along with coming off methamphetamines. Patient is resting comfortably with no complaint of chest pain shortness of breath or any focal signs or symptoms. He is taking good p.o. intake in no distress. Total urine output was 1175 cc. Leg bag was going to be applied but the patient declined and demanded the catheter be removed and that he wanted to leave. Patient was instructed to follow-up with urology at [facility] where he is urologist is. Patient was appreciative the care and agrees with the plan above.   Replete blood pressure was 165/106 which patient states is baseline for him

## 2021-01-31 NOTE — ED NOTES
Pt tolerated attempt to cath him, attempted twice, third time use a 12fr coude had success.       Gisela Campos RN  01/31/21 4427

## 2021-01-31 NOTE — ED TRIAGE NOTES
Dr. Mccauley Clas in with pt. And is going to release him. Pt. wants to go now. Does not want to stay any longer.

## 2021-01-31 NOTE — ED TRIAGE NOTES
Pt complaint of groin pain and trouble urinating. Pt is nervous. He stated a friend dropped him off but  walked to the hospital. Pt stated he he didn't have money to do meth, but has in the past. He stated the only way he could have meth in his system was if it was in his pepsi.

## 2021-02-27 ENCOUNTER — HOSPITAL ENCOUNTER (EMERGENCY)
Facility: HOSPITAL | Age: 51
Discharge: HOME | End: 2021-02-27
Attending: EMERGENCY MEDICINE | Admitting: EMERGENCY MEDICINE

## 2021-02-27 VITALS
HEART RATE: 93 BPM | RESPIRATION RATE: 18 BRPM | BODY MASS INDEX: 20.09 KG/M2 | HEIGHT: 66 IN | OXYGEN SATURATION: 99 % | WEIGHT: 125 LBS | DIASTOLIC BLOOD PRESSURE: 114 MMHG | SYSTOLIC BLOOD PRESSURE: 159 MMHG | TEMPERATURE: 98.2 F

## 2021-02-27 DIAGNOSIS — F15.10 METHAMPHETAMINE ABUSE (HCC): ICD-10-CM

## 2021-02-27 DIAGNOSIS — R33.9 URINE RETENTION: Primary | ICD-10-CM

## 2021-02-27 PROBLEM — R29.6 FREQUENT FALLS: Status: ACTIVE | Noted: 2021-02-27

## 2021-02-27 LAB
ALBUMIN SERPL-MCNC: 4.2 G/DL (ref 3.5–5.2)
ALBUMIN/GLOB SERPL: 1.6 G/DL
ALP SERPL-CCNC: 109 U/L (ref 39–117)
ALT SERPL W P-5'-P-CCNC: 27 U/L (ref 1–41)
ANION GAP SERPL CALCULATED.3IONS-SCNC: 12.7 MMOL/L (ref 5–15)
AST SERPL-CCNC: 51 U/L (ref 1–40)
BASOPHILS # BLD AUTO: 0.04 10*3/MM3 (ref 0–0.2)
BASOPHILS NFR BLD AUTO: 0.4 % (ref 0–1.5)
BILIRUB SERPL-MCNC: 0.2 MG/DL (ref 0–1.2)
BILIRUB UR QL STRIP: NEGATIVE
BUN SERPL-MCNC: 21 MG/DL (ref 6–20)
BUN/CREAT SERPL: 22.6 (ref 7–25)
CALCIUM SPEC-SCNC: 9.1 MG/DL (ref 8.6–10.5)
CHLORIDE SERPL-SCNC: 102 MMOL/L (ref 98–107)
CLARITY UR: ABNORMAL
CO2 SERPL-SCNC: 23.3 MMOL/L (ref 22–29)
COLOR UR: YELLOW
CREAT SERPL-MCNC: 0.93 MG/DL (ref 0.76–1.27)
DEPRECATED RDW RBC AUTO: 45.3 FL (ref 37–54)
EOSINOPHIL # BLD AUTO: 0.06 10*3/MM3 (ref 0–0.4)
EOSINOPHIL NFR BLD AUTO: 0.7 % (ref 0.3–6.2)
ERYTHROCYTE [DISTWIDTH] IN BLOOD BY AUTOMATED COUNT: 14 % (ref 12.3–15.4)
GFR SERPL CREATININE-BSD FRML MDRD: 87 ML/MIN/1.73
GLOBULIN UR ELPH-MCNC: 2.6 GM/DL
GLUCOSE SERPL-MCNC: 97 MG/DL (ref 65–99)
GLUCOSE UR STRIP-MCNC: ABNORMAL MG/DL
HCT VFR BLD AUTO: 40.5 % (ref 37.5–51)
HGB BLD-MCNC: 13.5 G/DL (ref 13–17.7)
HGB UR QL STRIP.AUTO: NEGATIVE
IMM GRANULOCYTES # BLD AUTO: 0.02 10*3/MM3 (ref 0–0.05)
IMM GRANULOCYTES NFR BLD AUTO: 0.2 % (ref 0–0.5)
KETONES UR QL STRIP: ABNORMAL
LEUKOCYTE ESTERASE UR QL STRIP.AUTO: NEGATIVE
LYMPHOCYTES # BLD AUTO: 1.53 10*3/MM3 (ref 0.7–3.1)
LYMPHOCYTES NFR BLD AUTO: 17.1 % (ref 19.6–45.3)
MCH RBC QN AUTO: 29.6 PG (ref 26.6–33)
MCHC RBC AUTO-ENTMCNC: 33.3 G/DL (ref 31.5–35.7)
MCV RBC AUTO: 88.8 FL (ref 79–97)
MONOCYTES # BLD AUTO: 0.78 10*3/MM3 (ref 0.1–0.9)
MONOCYTES NFR BLD AUTO: 8.7 % (ref 5–12)
NEUTROPHILS NFR BLD AUTO: 6.52 10*3/MM3 (ref 1.7–7)
NEUTROPHILS NFR BLD AUTO: 72.9 % (ref 42.7–76)
NITRITE UR QL STRIP: NEGATIVE
NRBC BLD AUTO-RTO: 0 /100 WBC (ref 0–0.2)
PH UR STRIP.AUTO: 7 [PH] (ref 5–8)
PLATELET # BLD AUTO: 276 10*3/MM3 (ref 140–450)
PMV BLD AUTO: 9 FL (ref 6–12)
POTASSIUM SERPL-SCNC: 4.1 MMOL/L (ref 3.5–5.2)
PROT SERPL-MCNC: 6.8 G/DL (ref 6–8.5)
PROT UR QL STRIP: NEGATIVE
RBC # BLD AUTO: 4.56 10*6/MM3 (ref 4.14–5.8)
SODIUM SERPL-SCNC: 138 MMOL/L (ref 136–145)
SP GR UR STRIP: 1.02 (ref 1–1.03)
UROBILINOGEN UR QL STRIP: ABNORMAL
WBC # BLD AUTO: 8.95 10*3/MM3 (ref 3.4–10.8)

## 2021-02-27 PROCEDURE — 85025 COMPLETE CBC W/AUTO DIFF WBC: CPT | Performed by: EMERGENCY MEDICINE

## 2021-02-27 PROCEDURE — 99283 EMERGENCY DEPT VISIT LOW MDM: CPT

## 2021-02-27 PROCEDURE — 81003 URINALYSIS AUTO W/O SCOPE: CPT | Performed by: EMERGENCY MEDICINE

## 2021-02-27 PROCEDURE — 51702 INSERT TEMP BLADDER CATH: CPT

## 2021-02-27 PROCEDURE — 80053 COMPREHEN METABOLIC PANEL: CPT | Performed by: EMERGENCY MEDICINE

## 2021-02-27 RX ADMIN — SODIUM CHLORIDE 1000 ML: 9 INJECTION, SOLUTION INTRAVENOUS at 18:35

## 2021-02-28 ENCOUNTER — HOSPITAL ENCOUNTER (EMERGENCY)
Facility: HOSPITAL | Age: 51
Discharge: HOME | End: 2021-02-28
Attending: EMERGENCY MEDICINE | Admitting: EMERGENCY MEDICINE

## 2021-02-28 VITALS
DIASTOLIC BLOOD PRESSURE: 99 MMHG | WEIGHT: 129.2 LBS | OXYGEN SATURATION: 99 % | SYSTOLIC BLOOD PRESSURE: 146 MMHG | TEMPERATURE: 98.3 F | BODY MASS INDEX: 20.76 KG/M2 | RESPIRATION RATE: 18 BRPM | HEART RATE: 90 BPM | HEIGHT: 66 IN

## 2021-02-28 DIAGNOSIS — R39.9 COMPLAINT ABOUT URINARY CATHETER: Primary | ICD-10-CM

## 2021-02-28 DIAGNOSIS — Z97.8 COMPLAINT ABOUT URINARY CATHETER: Primary | ICD-10-CM

## 2021-02-28 PROCEDURE — 99281 EMR DPT VST MAYX REQ PHY/QHP: CPT

## 2021-03-27 ENCOUNTER — HOSPITAL ENCOUNTER (EMERGENCY)
Facility: HOSPITAL | Age: 51
Discharge: ANOTHER HOSPITAL | End: 2021-03-28
Attending: EMERGENCY MEDICINE | Admitting: EMERGENCY MEDICINE

## 2021-03-27 DIAGNOSIS — F32.A DEPRESSION, UNSPECIFIED DEPRESSION TYPE: Primary | ICD-10-CM

## 2021-03-27 DIAGNOSIS — R45.851 SUICIDAL IDEATION: ICD-10-CM

## 2021-03-27 LAB
ALBUMIN SERPL-MCNC: 4.4 G/DL (ref 3.5–5.2)
ALBUMIN/GLOB SERPL: 1.6 G/DL
ALP SERPL-CCNC: 127 U/L (ref 39–117)
ALT SERPL W P-5'-P-CCNC: 22 U/L (ref 1–41)
AMPHET+METHAMPHET UR QL: POSITIVE
AMPHETAMINES UR QL: POSITIVE
ANION GAP SERPL CALCULATED.3IONS-SCNC: 14.3 MMOL/L (ref 5–15)
APAP SERPL-MCNC: <5 MCG/ML (ref 0–30)
AST SERPL-CCNC: 22 U/L (ref 1–40)
BARBITURATES UR QL SCN: NEGATIVE
BASOPHILS # BLD AUTO: 0.08 10*3/MM3 (ref 0–0.2)
BASOPHILS NFR BLD AUTO: 0.7 % (ref 0–1.5)
BENZODIAZ UR QL SCN: NEGATIVE
BILIRUB SERPL-MCNC: 0.3 MG/DL (ref 0–1.2)
BILIRUB UR QL STRIP: NEGATIVE
BUN SERPL-MCNC: 17 MG/DL (ref 6–20)
BUN/CREAT SERPL: 15.2 (ref 7–25)
BUPRENORPHINE SERPL-MCNC: NEGATIVE NG/ML
CALCIUM SPEC-SCNC: 9.7 MG/DL (ref 8.6–10.5)
CANNABINOIDS SERPL QL: NEGATIVE
CHLORIDE SERPL-SCNC: 96 MMOL/L (ref 98–107)
CLARITY UR: CLEAR
CO2 SERPL-SCNC: 25.7 MMOL/L (ref 22–29)
COCAINE UR QL: NEGATIVE
COLOR UR: YELLOW
CREAT SERPL-MCNC: 1.12 MG/DL (ref 0.76–1.27)
DEPRECATED RDW RBC AUTO: 45 FL (ref 37–54)
EOSINOPHIL # BLD AUTO: 0.06 10*3/MM3 (ref 0–0.4)
EOSINOPHIL NFR BLD AUTO: 0.5 % (ref 0.3–6.2)
ERYTHROCYTE [DISTWIDTH] IN BLOOD BY AUTOMATED COUNT: 13.4 % (ref 12.3–15.4)
ETHANOL BLD-MCNC: <10 MG/DL (ref 0–10)
ETHANOL UR QL: <0.01 %
GFR SERPL CREATININE-BSD FRML MDRD: 70 ML/MIN/1.73
GLOBULIN UR ELPH-MCNC: 2.8 GM/DL
GLUCOSE SERPL-MCNC: 96 MG/DL (ref 65–99)
GLUCOSE UR STRIP-MCNC: ABNORMAL MG/DL
HCT VFR BLD AUTO: 45 % (ref 37.5–51)
HGB BLD-MCNC: 14.8 G/DL (ref 13–17.7)
HGB UR QL STRIP.AUTO: NEGATIVE
HOLD SPECIMEN: NORMAL
HOLD SPECIMEN: NORMAL
IMM GRANULOCYTES # BLD AUTO: 0.04 10*3/MM3 (ref 0–0.05)
IMM GRANULOCYTES NFR BLD AUTO: 0.3 % (ref 0–0.5)
KETONES UR QL STRIP: NEGATIVE
LEUKOCYTE ESTERASE UR QL STRIP.AUTO: NEGATIVE
LYMPHOCYTES # BLD AUTO: 1.32 10*3/MM3 (ref 0.7–3.1)
LYMPHOCYTES NFR BLD AUTO: 11.5 % (ref 19.6–45.3)
MAGNESIUM SERPL-MCNC: 2.1 MG/DL (ref 1.6–2.6)
MCH RBC QN AUTO: 30 PG (ref 26.6–33)
MCHC RBC AUTO-ENTMCNC: 32.9 G/DL (ref 31.5–35.7)
MCV RBC AUTO: 91.1 FL (ref 79–97)
METHADONE UR QL SCN: NEGATIVE
MONOCYTES # BLD AUTO: 0.81 10*3/MM3 (ref 0.1–0.9)
MONOCYTES NFR BLD AUTO: 7.1 % (ref 5–12)
NEUTROPHILS NFR BLD AUTO: 79.9 % (ref 42.7–76)
NEUTROPHILS NFR BLD AUTO: 9.15 10*3/MM3 (ref 1.7–7)
NITRITE UR QL STRIP: NEGATIVE
NRBC BLD AUTO-RTO: 0 /100 WBC (ref 0–0.2)
OPIATES UR QL: NEGATIVE
OXYCODONE UR QL SCN: NEGATIVE
PCP UR QL SCN: NEGATIVE
PH UR STRIP.AUTO: 6.5 [PH] (ref 5–8)
PLATELET # BLD AUTO: 433 10*3/MM3 (ref 140–450)
PMV BLD AUTO: 8.6 FL (ref 6–12)
POTASSIUM SERPL-SCNC: 3.5 MMOL/L (ref 3.5–5.2)
PROPOXYPH UR QL: NEGATIVE
PROT SERPL-MCNC: 7.2 G/DL (ref 6–8.5)
PROT UR QL STRIP: NEGATIVE
RBC # BLD AUTO: 4.94 10*6/MM3 (ref 4.14–5.8)
SALICYLATES SERPL-MCNC: <0.3 MG/DL
SARS-COV-2 RNA PNL SPEC NAA+PROBE: NOT DETECTED
SODIUM SERPL-SCNC: 136 MMOL/L (ref 136–145)
SP GR UR STRIP: 1.01 (ref 1–1.03)
TRICYCLICS UR QL SCN: NEGATIVE
UROBILINOGEN UR QL STRIP: ABNORMAL
WBC # BLD AUTO: 11.46 10*3/MM3 (ref 3.4–10.8)
WHOLE BLOOD HOLD SPECIMEN: NORMAL
WHOLE BLOOD HOLD SPECIMEN: NORMAL

## 2021-03-27 PROCEDURE — 80179 DRUG ASSAY SALICYLATE: CPT

## 2021-03-27 PROCEDURE — 80143 DRUG ASSAY ACETAMINOPHEN: CPT

## 2021-03-27 PROCEDURE — 99284 EMERGENCY DEPT VISIT MOD MDM: CPT

## 2021-03-27 PROCEDURE — 80306 DRUG TEST PRSMV INSTRMNT: CPT

## 2021-03-27 PROCEDURE — 81003 URINALYSIS AUTO W/O SCOPE: CPT

## 2021-03-27 PROCEDURE — 80053 COMPREHEN METABOLIC PANEL: CPT | Performed by: EMERGENCY MEDICINE

## 2021-03-27 PROCEDURE — 85025 COMPLETE CBC W/AUTO DIFF WBC: CPT | Performed by: EMERGENCY MEDICINE

## 2021-03-27 PROCEDURE — 82077 ASSAY SPEC XCP UR&BREATH IA: CPT

## 2021-03-27 PROCEDURE — 87635 SARS-COV-2 COVID-19 AMP PRB: CPT | Performed by: EMERGENCY MEDICINE

## 2021-03-27 PROCEDURE — 83735 ASSAY OF MAGNESIUM: CPT

## 2021-03-27 PROCEDURE — C9803 HOPD COVID-19 SPEC COLLECT: HCPCS

## 2021-03-27 PROCEDURE — 93005 ELECTROCARDIOGRAM TRACING: CPT

## 2021-03-27 RX ADMIN — NICOTINE POLACRILEX 2 MG: 2 GUM, CHEWING ORAL at 22:12

## 2021-03-28 VITALS
BODY MASS INDEX: 20.89 KG/M2 | OXYGEN SATURATION: 98 % | RESPIRATION RATE: 18 BRPM | WEIGHT: 130 LBS | HEIGHT: 66 IN | TEMPERATURE: 98.4 F | DIASTOLIC BLOOD PRESSURE: 99 MMHG | HEART RATE: 70 BPM | SYSTOLIC BLOOD PRESSURE: 168 MMHG

## 2021-04-16 ENCOUNTER — DOCUMENTATION (OUTPATIENT)
Dept: EMERGENCY DEPT | Facility: HOSPITAL | Age: 51
End: 2021-04-16

## 2021-04-16 ENCOUNTER — HOSPITAL ENCOUNTER (EMERGENCY)
Facility: HOSPITAL | Age: 51
Discharge: HOME | End: 2021-04-16
Attending: EMERGENCY MEDICINE | Admitting: EMERGENCY MEDICINE

## 2021-04-16 ENCOUNTER — APPOINTMENT (OUTPATIENT)
Dept: ULTRASOUND IMAGING | Facility: HOSPITAL | Age: 51
End: 2021-04-16

## 2021-04-16 VITALS
DIASTOLIC BLOOD PRESSURE: 107 MMHG | OXYGEN SATURATION: 99 % | TEMPERATURE: 97.8 F | HEIGHT: 66 IN | HEART RATE: 109 BPM | WEIGHT: 125 LBS | RESPIRATION RATE: 18 BRPM | BODY MASS INDEX: 20.09 KG/M2 | SYSTOLIC BLOOD PRESSURE: 189 MMHG

## 2021-04-16 DIAGNOSIS — N50.819 PAIN IN TESTICLE, UNSPECIFIED LATERALITY: Primary | ICD-10-CM

## 2021-04-16 DIAGNOSIS — F99 CHRONIC MENTAL ILLNESS: ICD-10-CM

## 2021-04-16 LAB
ALBUMIN SERPL-MCNC: 4.5 G/DL (ref 3.5–5.2)
ALBUMIN/GLOB SERPL: 1.5 G/DL
ALP SERPL-CCNC: 114 U/L (ref 39–117)
ALT SERPL W P-5'-P-CCNC: 18 U/L (ref 1–41)
ANION GAP SERPL CALCULATED.3IONS-SCNC: 13.1 MMOL/L (ref 5–15)
APAP SERPL-MCNC: <5 MCG/ML (ref 0–30)
AST SERPL-CCNC: 24 U/L (ref 1–40)
BASOPHILS # BLD AUTO: 0.04 10*3/MM3 (ref 0–0.2)
BASOPHILS NFR BLD AUTO: 0.6 % (ref 0–1.5)
BILIRUB SERPL-MCNC: 0.4 MG/DL (ref 0–1.2)
BUN SERPL-MCNC: 14 MG/DL (ref 6–20)
BUN/CREAT SERPL: 10.9 (ref 7–25)
CALCIUM SPEC-SCNC: 9.7 MG/DL (ref 8.6–10.5)
CHLORIDE SERPL-SCNC: 97 MMOL/L (ref 98–107)
CO2 SERPL-SCNC: 25.9 MMOL/L (ref 22–29)
CREAT SERPL-MCNC: 1.29 MG/DL (ref 0.76–1.27)
DEPRECATED RDW RBC AUTO: 44.9 FL (ref 37–54)
EOSINOPHIL # BLD AUTO: 0.03 10*3/MM3 (ref 0–0.4)
EOSINOPHIL NFR BLD AUTO: 0.4 % (ref 0.3–6.2)
ERYTHROCYTE [DISTWIDTH] IN BLOOD BY AUTOMATED COUNT: 13.8 % (ref 12.3–15.4)
ETHANOL BLD-MCNC: <10 MG/DL (ref 0–10)
ETHANOL UR QL: <0.01 %
GFR SERPL CREATININE-BSD FRML MDRD: 59 ML/MIN/1.73
GLOBULIN UR ELPH-MCNC: 3 GM/DL
GLUCOSE SERPL-MCNC: 109 MG/DL (ref 65–99)
HCT VFR BLD AUTO: 43.7 % (ref 37.5–51)
HGB BLD-MCNC: 14.6 G/DL (ref 13–17.7)
HOLD SPECIMEN: NORMAL
IMM GRANULOCYTES # BLD AUTO: 0.01 10*3/MM3 (ref 0–0.05)
IMM GRANULOCYTES NFR BLD AUTO: 0.1 % (ref 0–0.5)
LYMPHOCYTES # BLD AUTO: 1.05 10*3/MM3 (ref 0.7–3.1)
LYMPHOCYTES NFR BLD AUTO: 14.7 % (ref 19.6–45.3)
MCH RBC QN AUTO: 29.8 PG (ref 26.6–33)
MCHC RBC AUTO-ENTMCNC: 33.4 G/DL (ref 31.5–35.7)
MCV RBC AUTO: 89.2 FL (ref 79–97)
MONOCYTES # BLD AUTO: 0.76 10*3/MM3 (ref 0.1–0.9)
MONOCYTES NFR BLD AUTO: 10.6 % (ref 5–12)
NEUTROPHILS NFR BLD AUTO: 5.25 10*3/MM3 (ref 1.7–7)
NEUTROPHILS NFR BLD AUTO: 73.6 % (ref 42.7–76)
NRBC BLD AUTO-RTO: 0 /100 WBC (ref 0–0.2)
PLATELET # BLD AUTO: 440 10*3/MM3 (ref 140–450)
PMV BLD AUTO: 9 FL (ref 6–12)
POTASSIUM SERPL-SCNC: 3.8 MMOL/L (ref 3.5–5.2)
PROT SERPL-MCNC: 7.5 G/DL (ref 6–8.5)
RBC # BLD AUTO: 4.9 10*6/MM3 (ref 4.14–5.8)
SALICYLATES SERPL-MCNC: <0.3 MG/DL
SARS-COV-2 RNA PNL SPEC NAA+PROBE: NOT DETECTED
SODIUM SERPL-SCNC: 136 MMOL/L (ref 136–145)
WBC # BLD AUTO: 7.14 10*3/MM3 (ref 3.4–10.8)
WHOLE BLOOD HOLD SPECIMEN: NORMAL
WHOLE BLOOD HOLD SPECIMEN: NORMAL

## 2021-04-16 PROCEDURE — 80143 DRUG ASSAY ACETAMINOPHEN: CPT | Performed by: PHYSICIAN ASSISTANT

## 2021-04-16 PROCEDURE — 76870 US EXAM SCROTUM: CPT

## 2021-04-16 PROCEDURE — 99284 EMERGENCY DEPT VISIT MOD MDM: CPT

## 2021-04-16 PROCEDURE — 93005 ELECTROCARDIOGRAM TRACING: CPT | Performed by: PHYSICIAN ASSISTANT

## 2021-04-16 PROCEDURE — 96375 TX/PRO/DX INJ NEW DRUG ADDON: CPT

## 2021-04-16 PROCEDURE — 80179 DRUG ASSAY SALICYLATE: CPT | Performed by: PHYSICIAN ASSISTANT

## 2021-04-16 PROCEDURE — 85025 COMPLETE CBC W/AUTO DIFF WBC: CPT | Performed by: PHYSICIAN ASSISTANT

## 2021-04-16 PROCEDURE — 80053 COMPREHEN METABOLIC PANEL: CPT | Performed by: PHYSICIAN ASSISTANT

## 2021-04-16 PROCEDURE — 25010000002 HYDRALAZINE PER 20 MG: Performed by: PHYSICIAN ASSISTANT

## 2021-04-16 PROCEDURE — 82077 ASSAY SPEC XCP UR&BREATH IA: CPT | Performed by: PHYSICIAN ASSISTANT

## 2021-04-16 PROCEDURE — 25010000002 KETOROLAC TROMETHAMINE PER 15 MG: Performed by: PHYSICIAN ASSISTANT

## 2021-04-16 PROCEDURE — 96374 THER/PROPH/DIAG INJ IV PUSH: CPT

## 2021-04-16 PROCEDURE — 87635 SARS-COV-2 COVID-19 AMP PRB: CPT | Performed by: PHYSICIAN ASSISTANT

## 2021-04-16 RX ORDER — HYDRALAZINE HYDROCHLORIDE 20 MG/ML
10 INJECTION INTRAMUSCULAR; INTRAVENOUS ONCE
Status: COMPLETED | OUTPATIENT
Start: 2021-04-16 | End: 2021-04-16

## 2021-04-16 RX ORDER — KETOROLAC TROMETHAMINE 30 MG/ML
30 INJECTION, SOLUTION INTRAMUSCULAR; INTRAVENOUS ONCE
Status: COMPLETED | OUTPATIENT
Start: 2021-04-16 | End: 2021-04-16

## 2021-04-16 RX ORDER — SODIUM CHLORIDE 0.9 % (FLUSH) 0.9 %
10 SYRINGE (ML) INJECTION AS NEEDED
Status: DISCONTINUED | OUTPATIENT
Start: 2021-04-16 | End: 2021-04-16 | Stop reason: HOSPADM

## 2021-04-16 RX ADMIN — KETOROLAC TROMETHAMINE 30 MG: 30 INJECTION, SOLUTION INTRAMUSCULAR; INTRAVENOUS at 14:33

## 2021-04-16 RX ADMIN — SODIUM CHLORIDE 1000 ML: 9 INJECTION, SOLUTION INTRAVENOUS at 14:33

## 2021-04-16 RX ADMIN — HYDRALAZINE HYDROCHLORIDE 10 MG: 20 INJECTION INTRAMUSCULAR; INTRAVENOUS at 14:33

## 2021-04-16 NOTE — ED PROVIDER NOTES
Subjective   50-year-old male presents the ER with complaints of groin pain and hypertension.  Patient was previously treated at [facility] for an STI, patient states the STIs gonorrhea.  Patient received a shot of Rocephin as well as oral azithromycin.  Social history is positive for patient being homeless.  Patient is requesting consultation for possible shelters to stay in.  Patient social history is also positive for snorting methamphetamine.  Patient states he last used methamphetamine 2 days ago.  Patient verbalizes he does have a history of testicular cancer with one of his testicles being removed.          Review of Systems   Constitutional: Negative.  Negative for fever.   HENT: Negative.    Respiratory: Negative.    Cardiovascular: Negative.  Negative for chest pain.   Gastrointestinal: Negative.  Negative for abdominal pain.   Endocrine: Negative.    Genitourinary: Positive for testicular pain. Negative for dysuria.   Skin: Negative.    Neurological: Negative.    Psychiatric/Behavioral: Negative.    All other systems reviewed and are negative.      Past Medical History:   Diagnosis Date   • Bipolar 1 disorder (CMS/HCC)    • Cancer (CMS/[facility])     testicular   • Depression    • Diabetes mellitus (CMS/[facility])    • Hypertension        No Known Allergies    Past Surgical History:   Procedure Laterality Date   • TESTICLE SURGERY     • TESTICLE SURGERY         History reviewed. No pertinent family history.    Social History     Socioeconomic History   • Marital status: Single     Spouse name: Not on file   • Number of children: Not on file   • Years of education: Not on file   • Highest education level: Not on file   Tobacco Use   • Smoking status: Never Smoker   • Smokeless tobacco: Current User     Types: Chew   Substance and Sexual Activity   • Alcohol use: No   • Drug use: Yes     Types: Methamphetamines     Comment: LAST USE 2 DAYS AGO           Objective   Physical Exam  Vitals and nursing note reviewed.  "  Constitutional:       General: He is not in acute distress.     Appearance: He is well-developed. He is not diaphoretic.   HENT:      Head: Normocephalic and atraumatic.      Right Ear: External ear normal.      Left Ear: External ear normal.      Nose: Nose normal.   Eyes:      Conjunctiva/sclera: Conjunctivae normal.   Neck:      Vascular: No JVD.      Trachea: No tracheal deviation.   Cardiovascular:      Rate and Rhythm: Normal rate and regular rhythm.      Heart sounds: No murmur heard.     Pulmonary:      Effort: Pulmonary effort is normal. No respiratory distress.      Breath sounds: No wheezing.   Abdominal:      Palpations: Abdomen is soft.      Tenderness: There is no abdominal tenderness.   Musculoskeletal:         General: No deformity. Normal range of motion.      Cervical back: Normal range of motion and neck supple.   Skin:     General: Skin is warm and dry.      Coloration: Skin is not pale.      Findings: No erythema or rash.   Neurological:      Mental Status: He is alert and oriented to person, place, and time.      Cranial Nerves: No cranial nerve deficit.   Psychiatric:      Comments: Patient appears to be suffering from psychosis or that this is acute on chronic will be determined by intake services         Procedures           ED Course  ED Course as of Apr 16 1631 Fri Apr 16, 2021   1429 Patient did test positive for gonorrhea at the \"[facility].\"  Patient received a shot of Rocephin as well as oral azithromycin.  Secondary to patient not receiving treatment for this STI no further action will be made.    [RB]   1544 EKG interpreted by me.  Sinus rhythm.  Tachycardic.  Rate of 112.  Occasional PVC.  No ST segment or T wave changes.  Abnormal EKG    [CG]   1552 US rad interpreted:  No evidence of an intratesticular mass or torsion.    [RB]   1625 Patient has been cleared by intake behavioral health to be discharged home.  This point in time, patient has received treatment for STI and " there is no acute abnormality within the groin patient will be discharged home    [RB]   1630 Patient was offered a shelter treatment by  in [facility] in which patient refused    [RB]      ED Course User Index  [CG] Ashish Campos,   [RB] Arnold Mcdermott II, PA                                           MDM  Number of Diagnoses or Management Options  Chronic mental illness: established and worsening  Pain in testicle, unspecified laterality: new and requires workup     Amount and/or Complexity of Data Reviewed  Clinical lab tests: ordered and reviewed  Tests in the radiology section of CPT®: reviewed and ordered    Risk of Complications, Morbidity, and/or Mortality  Presenting problems: low  Diagnostic procedures: low  Management options: low    Patient Progress  Patient progress: stable      Final diagnoses:   Pain in testicle, unspecified laterality   Chronic mental illness       ED Disposition  ED Disposition     ED Disposition Condition Comment    Discharge Stable           [facility] Emergency Department  [address] [address] [phone]             Medication List      No changes were made to your prescriptions during this visit.          Arnold Mcdermott II, PA  04/16/21 6874

## 2021-04-16 NOTE — ED NOTES
Called and spoke to [facility] in regards to a consult for patient per patient request.      Judith Prather, RN  04/16/21 4006

## 2021-04-16 NOTE — ED NOTES
Pt was given resources from both  and behavioral health. Also gave pt snack pack and a drink to go per pt request.      Judith Prather RN  04/16/21 5686

## 2021-04-16 NOTE — ED NOTES
"Stefano Claire  1970    TIME: 1600 - 1630    Is patient agreeable to admission/treatment? Yes - Agreeable to following up with outpatient med mgmt and therapy     Guardian: Self    Pt Lives With: Self    Presenting Problems: Patient     Current Stressors: Sexual infatuation; Homelessness; chronic mental issues; substance use; employment concern; lack of resources; limited support system    Depression: \"Kind of but not really right now.\"     Anxiety: \"Not really.\"    Previous Psychiatric Treatment: Yes    If yes, describe: Hx of various inpatient admissions at various facilities ([facility], [facility]); Hx of outpatient med mgmt and therapy at [facility].  Patient reports \"I stopped going cause I got on that ice (meth).\"    Last inpatient admission: March 2021    Number of admissions: \"A few here and there.\"  Pt is a poor historian.    Last outpatient visit: Pt reports hx of outpatient treatment at [facility] but is unsure of dates/times.  Pt is a poor historian.    Suicidal: Absent    Previous Attempts: \"A few, but none recent.\"    Number of Previous Attempts: \"A few times.\"    Most Recent Attempt: Pt reports \"none recent.\"  Pt is a poor historian.    [place]-SUICIDE SEVERITY RATING SCALE  Psychiatric Inpatient Setting - Discharge Screener    Ask questions that are bold and underlined Discharge   Ask Questions 1 and 2 YES NO   1) Wish to be Dead:   Person endorses thoughts about a wish to be dead or not alive anymore, or wish to fall asleep and not wake up.  While you were here in the hospital, have you wished you were dead or wished you could go to sleep and not wake up?  No   2) Suicidal Thoughts:   General non-specific thoughts of wanting to end one's life/die by suicide, “I've thought about killing myself” without general thoughts of ways to kill oneself/associated methods, intent, or plan.   While you were here in the hospital, have you actually had thoughts about killing yourself?   No   If " "YES to 2, ask questions 3, 4, 5, and 6.  If NO to 2, go directly to question 6   3) Suicidal Thoughts with Method (without Specific Plan or Intent to Act):   Person endorses thoughts of suicide and has thought of a least one method during the assessment period. This is different than a specific plan with time, place or method details worked out. “I thought about taking an overdose but I never made a specific plan as to when where or how I would actually do it….and I would never go through with it.”   Have you been thinking about how you might kill yourself?   No   4) Suicidal Intent (without Specific Plan):   Active suicidal thoughts of killing oneself and patient reports having some intent to act on such thoughts, as opposed to “I have the thoughts but I definitely will not do anything about them.”   Have you had these thoughts and had some intention of acting on them or do you have some intention of acting on them after you leave the hospital?   No   5) Suicide Intent with Specific Plan:   Thoughts of killing oneself with details of plan fully or partially worked out and person has some intent to carry it out.   Have you started to work out or worked out the details of how to kill yourself either for while you were here in the hospital or for after you leave the hospital? Do you intend to carry out this plan?   No     6) Suicide Behavior    While you were here in the hospital, have you done anything, started to do anything, or prepared to do anything to end your life?    Examples: Took pills, cut yourself, tried to hang yourself, took out pills but didn't swallow any because you changed your mind or someone took them from you, collected pills, secured a means of obtaining a gun, gave away valuables, wrote a will or suicide note, etc.  No       Family Hx of Mental Health/Substance Abuse:  \"I don't know.  I didn't get enough attention growing up.\"    Delusions: Patient reports \"I feel like I can talk to the devil " "sometimes, but only when I use ice.\"     Hallucinations: Not demonstrated today - Pt endorses AVH when intoxicated on methamphetamine, which he does use periodically, but he denies use x 2 days.  Pt is not responding to internal stimuli or perceptual disturbances during the assessment, and denies any current AVH.    Mood: Constricted, calm    Homicidal Ideations: Absent     Abuse History: Further details: \"Not that I know of.\"     Does this require reporting: N/A    Legal History / History of Violence: \"I have been arrested before because of my diaper and wanting to be embarrased by using my diaper in public.\"     Sleep: Good    Appetite: Fair, related to lack of access.  Pt does confirm he utilizes local food [employer] and [employer] for meals.    Current Medical Conditions: Yes    If yes, explain: Hypertension; Diabetic; Depression; Bipolar 1; Methamphetamine abuse; Reports hx of cancer    Current Psychiatric Medications: Seroquel 200MG; Desyrel 100MG     History of Inappropriate Sexual Behavior: Pt reports being \"sexually aroused\" by defecating and eliminating in a diaper and having women \"change the diaper.\"    Hopelessness: Denies    Orientation: alert and oriented to person, place, and time     Substance Abuse: occasional binge use    COWS: Pt denies    CIWA: Pt denies    Withdrawal Symptoms: N/A     History of DT's: No    History of Seizures: No    SUBSTANCE ABUSE HISTORY:      DRUG   PRESENT USE  Y/N   AGE @ 1ST USE    ROUTE   HOW MUCH   HOW OFTEN   HOW LONG AT THIS RATE   Date of last use/  Amount used   Nicotine   Yes Teens Oral 1 can chewing tobacco Daily 30+ years 4/16/2021   Alcohol   No         Marijuana   No         Benzos     No         Neurontin   No         Methadone   No         Opiates     No         Cocaine    No         Heroin   No         Meth   Yes 40s Nasal Various Various 1 year Reportedly 4/14/2021   Suboxone              If in active addiction, do living arrangements affect recovery?: Pt is " "housing insecure and thus at higher risk of relapse and substance use in the streets      DATA:   This therapist received a call from [facility] staff (Judith HUANG RN) with orders from (ANDREW Mcdermott) for a behavioral health consult.  The patient serves as his own guardian and is agreeable to speak with me.  Met with patient at bedside. Patient is not under 1:1 security monitoring during assessment.  Patient is a 50 year old, single, , male residing in [place], [place]. Patient currently lives \"on the streets\" and is sleeping in a tent.  Patient is unemployed but has a job interview at a local restaurant in 1 week.    Patient presents today with chief compliant of hypertension and groin pain.  Stefano is a 50 year old male with a history of depression, bipolar 1 and methamphetamine abuse. He reports to me he was at the [facility] inside of [facility] today and \"was being looked at for an STD.\"  Pt reports he \"had high blood pressure so they called me an ambulance to come here.  But my blood pressure always gets high after I use ice.\"  Pt reports he feels much better after his medical treatment in the ED today.  Pt reports he made a remark to the nursing staff \"about wanting to talk to a \" about various housing options.  Pt was consulted by Evie Haynes LCSW, who was able to offer various shelter and transportation resources, but the pt declined.  Pt reports he would prefer to sleep in his tent, which he has been residing in for an extended period of time.  Pt denies SI, HI, AVH.  He does openly endorse meth/ice abuse, and states his last date of use was 2 days ago.  He denies any AVH or lingering effects of methamphetamine intoxication.  He is open about his depression hx and hx of SI, and adamantly denies any current suicidal ideation, intention, plans or behaviors.  When asked about his main concerns today, he reports he would like \"to have someone change my diaper.  I like to wear diapers and " "act like a baby, and I like when people change me.\"  Pt then goes on to report \"It arouses me.  I ask some women to take my picture in the diaper and change me.  I like to be embarrassed by it.\"  Pt goes on to ask if therapist believed he would qualify for a nursing home, or Hospice, in order to have medical staff change his diaper.  Therapist redirected.  When asked about trauma, patient reports \"Not that I know of.  I guess I didn't get any attention when I was little.\"  Pt adamantly denies any violent thoughts, aggressive behaviors or HI.  He reports he would like something to eat and to discharge, if the treatment team would not be changing his diaper.    He declines any desire for assistance with establishing case mgmt, med mgmt or psychotherapy, although was willing to take resources and information on how to establish these services. Patient reports he does have access to a phone and Internet at the local library, and does have access to transportation via public transit or walking.    ASSESSMENT:    Therapist completed CSSRS with patient for suicide risk assessment.  The results of patient’s CSSRS suggest that patient is denying suicidal ideation, intention, plans or behaviors. Patient holds attention and is Cooperative with assessment although is fixated on sexual preoccupation with being in a diaper.  Patient’s appearance is disheveled, unkempt.  The patient displays Appropriate psychomotor behavior. The patient's affect appears constricted and calm, cooperative, easily redirected. The patient is observed to have normal rate, tone and rhythm of speech.   Patient observed to have Sustained eye contact. The patient's displays poor insight, with poor impulse control and limited judgement.     PLAN:    Stefano endorses baseline sexual preoccupations with age inappropriate behaviors, in combination with substance abuse and hx of mental health dx.  He adamantly denies all acute high risk indicators such as " SI/HI/AVH. Patient does not present with criteria to warrant an involuntary petition or psychiatric hold at this time as he is not actively suicidal, homicidal, or displaying symptoms of an acute psychotic episode.  In addition, the patient has multiple protective factors including: Able to identify how to contact services; willingness to utilize the ED for crisis management; able to identify how to utilize community services (food, housing) and  is agreeable to taking information about outpatient behavioral health services.  I provided resources for outpatient providers in the area.  I also discussed the availability of emergency behavioral health services 24/7 at [facility]. Pt is familiar with utilizing services and voices willingness to do so.  Assisted patient in identifying risk factors that would indicate the need for higher level of care, such as thoughts to harm self or others and/or self-harming behavior(s). Encouraged patient to call 911, crisis hotlines, or present to the nearest emergency department should symptoms worsen, or in any crisis/emergency. Patient agreeable and voiced understanding.  Staffed with Judith HUANG RN, ANDREW Mcdermott and MD Andres who agree with disposition.    -Eryn Cerda, [facility]         Eryn Cerda, [facility]  04/16/21 8262

## 2021-04-16 NOTE — ED NOTES
Contacted Evie with social work to speak with patient per JANICE Wong.     Tracy Parker  04/16/21 6687

## 2021-04-16 NOTE — PROGRESS NOTES
Continued Stay Note  [facility]     Patient Name: Stefano Claire  MRN: [id number]  Today's Date: 4/16/2021    Admit Date: 4/16/2021    Discharge Plan     Row Name 04/16/21 1510       Plan    Plan  Discussed resources and the availability of the [place]  shelter could take patient. Informed him his insurance medical transport could take him. [facility] only taking people if their home has burned or flooded. Patient declines stating he has job interview and wants to stay in [place] and wanted to know where to put his tent. I am unaware of this location. Discussed patient using our pharmacy for any medicines today so we can make sure he gets them. Reviewed with nurse.    Row Name 04/16/21 1427       Plan    Plan  Consult for homelessness. Number in chart is not patient's number.        Discharge Codes    No documentation.             Evie Haynes LCSW

## 2021-04-24 ENCOUNTER — HOSPITAL ENCOUNTER (EMERGENCY)
Facility: HOSPITAL | Age: 51
Discharge: PSYCHIATRIC OR SUBSTANCE USE FACILITY | End: 2021-04-24
Attending: EMERGENCY MEDICINE | Admitting: EMERGENCY MEDICINE

## 2021-04-24 VITALS
WEIGHT: 127.4 LBS | DIASTOLIC BLOOD PRESSURE: 79 MMHG | HEIGHT: 66 IN | OXYGEN SATURATION: 97 % | BODY MASS INDEX: 20.48 KG/M2 | HEART RATE: 85 BPM | RESPIRATION RATE: 18 BRPM | SYSTOLIC BLOOD PRESSURE: 119 MMHG | TEMPERATURE: 97.8 F

## 2021-04-24 DIAGNOSIS — R45.851 SUICIDAL IDEATION: Primary | ICD-10-CM

## 2021-04-24 LAB
ALBUMIN SERPL-MCNC: 3.6 G/DL (ref 3.5–5.2)
ALBUMIN/GLOB SERPL: 1.3 G/DL
ALP SERPL-CCNC: 115 U/L (ref 39–117)
ALT SERPL W P-5'-P-CCNC: 24 U/L (ref 1–41)
AMPHET+METHAMPHET UR QL: POSITIVE
AMPHETAMINES UR QL: POSITIVE
ANION GAP SERPL CALCULATED.3IONS-SCNC: 11.5 MMOL/L (ref 5–15)
AST SERPL-CCNC: 48 U/L (ref 1–40)
BACTERIA UR QL AUTO: ABNORMAL /HPF
BARBITURATES UR QL SCN: NEGATIVE
BASOPHILS # BLD AUTO: 0.05 10*3/MM3 (ref 0–0.2)
BASOPHILS NFR BLD AUTO: 0.5 % (ref 0–1.5)
BENZODIAZ UR QL SCN: NEGATIVE
BILIRUB SERPL-MCNC: 0.2 MG/DL (ref 0–1.2)
BILIRUB UR QL STRIP: NEGATIVE
BUN SERPL-MCNC: 29 MG/DL (ref 6–20)
BUN/CREAT SERPL: 29 (ref 7–25)
BUPRENORPHINE SERPL-MCNC: NEGATIVE NG/ML
CALCIUM SPEC-SCNC: 9.1 MG/DL (ref 8.6–10.5)
CANNABINOIDS SERPL QL: NEGATIVE
CHLORIDE SERPL-SCNC: 98 MMOL/L (ref 98–107)
CLARITY UR: CLEAR
CO2 SERPL-SCNC: 27.5 MMOL/L (ref 22–29)
COCAINE UR QL: NEGATIVE
COLOR UR: YELLOW
CREAT SERPL-MCNC: 1 MG/DL (ref 0.76–1.27)
DEPRECATED RDW RBC AUTO: 43.4 FL (ref 37–54)
EOSINOPHIL # BLD AUTO: 0.2 10*3/MM3 (ref 0–0.4)
EOSINOPHIL NFR BLD AUTO: 2 % (ref 0.3–6.2)
ERYTHROCYTE [DISTWIDTH] IN BLOOD BY AUTOMATED COUNT: 13.3 % (ref 12.3–15.4)
ETHANOL BLD-MCNC: <10 MG/DL (ref 0–10)
ETHANOL UR QL: <0.01 %
GFR SERPL CREATININE-BSD FRML MDRD: 80 ML/MIN/1.73
GLOBULIN UR ELPH-MCNC: 2.7 GM/DL
GLUCOSE SERPL-MCNC: 143 MG/DL (ref 65–99)
GLUCOSE UR STRIP-MCNC: ABNORMAL MG/DL
HCT VFR BLD AUTO: 38 % (ref 37.5–51)
HGB BLD-MCNC: 12.6 G/DL (ref 13–17.7)
HGB UR QL STRIP.AUTO: ABNORMAL
HYALINE CASTS UR QL AUTO: ABNORMAL /LPF
IMM GRANULOCYTES # BLD AUTO: 0.03 10*3/MM3 (ref 0–0.05)
IMM GRANULOCYTES NFR BLD AUTO: 0.3 % (ref 0–0.5)
KETONES UR QL STRIP: NEGATIVE
LEUKOCYTE ESTERASE UR QL STRIP.AUTO: NEGATIVE
LYMPHOCYTES # BLD AUTO: 1.13 10*3/MM3 (ref 0.7–3.1)
LYMPHOCYTES NFR BLD AUTO: 11.6 % (ref 19.6–45.3)
MCH RBC QN AUTO: 29.8 PG (ref 26.6–33)
MCHC RBC AUTO-ENTMCNC: 33.2 G/DL (ref 31.5–35.7)
MCV RBC AUTO: 89.8 FL (ref 79–97)
METHADONE UR QL SCN: NEGATIVE
MONOCYTES # BLD AUTO: 0.87 10*3/MM3 (ref 0.1–0.9)
MONOCYTES NFR BLD AUTO: 8.9 % (ref 5–12)
NEUTROPHILS NFR BLD AUTO: 7.5 10*3/MM3 (ref 1.7–7)
NEUTROPHILS NFR BLD AUTO: 76.7 % (ref 42.7–76)
NITRITE UR QL STRIP: NEGATIVE
NRBC BLD AUTO-RTO: 0 /100 WBC (ref 0–0.2)
OPIATES UR QL: NEGATIVE
OXYCODONE UR QL SCN: NEGATIVE
PCP UR QL SCN: NEGATIVE
PH UR STRIP.AUTO: <=5 [PH] (ref 5–8)
PLATELET # BLD AUTO: 349 10*3/MM3 (ref 140–450)
PMV BLD AUTO: 8.7 FL (ref 6–12)
POTASSIUM SERPL-SCNC: 3.5 MMOL/L (ref 3.5–5.2)
PROPOXYPH UR QL: NEGATIVE
PROT SERPL-MCNC: 6.3 G/DL (ref 6–8.5)
PROT UR QL STRIP: ABNORMAL
RBC # BLD AUTO: 4.23 10*6/MM3 (ref 4.14–5.8)
RBC # UR: ABNORMAL /HPF
REF LAB TEST METHOD: ABNORMAL
SARS-COV-2 RNA PNL SPEC NAA+PROBE: NOT DETECTED
SODIUM SERPL-SCNC: 137 MMOL/L (ref 136–145)
SP GR UR STRIP: 1.03 (ref 1–1.03)
SQUAMOUS #/AREA URNS HPF: ABNORMAL /HPF
TRICYCLICS UR QL SCN: NEGATIVE
UROBILINOGEN UR QL STRIP: ABNORMAL
WBC # BLD AUTO: 9.78 10*3/MM3 (ref 3.4–10.8)
WBC UR QL AUTO: ABNORMAL /HPF

## 2021-04-24 PROCEDURE — 81001 URINALYSIS AUTO W/SCOPE: CPT | Performed by: PHYSICIAN ASSISTANT

## 2021-04-24 PROCEDURE — 99284 EMERGENCY DEPT VISIT MOD MDM: CPT

## 2021-04-24 PROCEDURE — 82077 ASSAY SPEC XCP UR&BREATH IA: CPT | Performed by: PHYSICIAN ASSISTANT

## 2021-04-24 PROCEDURE — 80306 DRUG TEST PRSMV INSTRMNT: CPT | Performed by: PHYSICIAN ASSISTANT

## 2021-04-24 PROCEDURE — C9803 HOPD COVID-19 SPEC COLLECT: HCPCS

## 2021-04-24 PROCEDURE — 80053 COMPREHEN METABOLIC PANEL: CPT | Performed by: PHYSICIAN ASSISTANT

## 2021-04-24 PROCEDURE — 99285 EMERGENCY DEPT VISIT HI MDM: CPT

## 2021-04-24 PROCEDURE — 85025 COMPLETE CBC W/AUTO DIFF WBC: CPT | Performed by: PHYSICIAN ASSISTANT

## 2021-04-24 PROCEDURE — 87635 SARS-COV-2 COVID-19 AMP PRB: CPT | Performed by: PHYSICIAN ASSISTANT

## 2021-04-24 NOTE — ED PROVIDER NOTES
Subjective   50-year-old male presents the emergency department with chief complaint suicidal ideation.  Patient states has specific plan of walking on train tracks and getting hit by a train.  Patient does have previous history of psychiatric illness with bipolar disorder, depression.  Patient also reports history of diabetes and hypertension.  Denies any alcohol or drug abuse.      History provided by:  Patient   used: No    Mental Health Problem  Presenting symptoms: agitation, suicidal thoughts and suicidal threats    Presenting symptoms: no bizarre behavior, no delusions, no depression, no disorganized speech, no hallucinations, no homicidal ideas and no paranoid behavior    Patient accompanied by:  Caregiver  Degree of incapacity (severity):  Moderate  Onset quality:  Gradual  Duration:  1 week  Timing:  Intermittent  Progression:  Worsening  Chronicity:  New  Context: not alcohol use, not drug abuse, not medication, not noncompliant, not recent medication change and not stressful life event    Treatment compliance:  Untreated  Time since last psychoactive medication taken:  1 week  Relieved by:  Nothing  Worsened by:  Nothing  Ineffective treatments:  None tried  Associated symptoms: anxiety    Associated symptoms: no abdominal pain, no appetite change, no chest pain, no decreased need for sleep, not distractible, no headaches, no hypersomnia, no insomnia, no irritability and no poor judgment    Risk factors: no family hx of mental illness, no family violence, no hx of mental illness, no hx of suicide attempts and no neurological disease        Review of Systems   Constitutional: Negative.  Negative for appetite change and irritability.   HENT: Negative.  Negative for dental problem, drooling, ear discharge, ear pain, facial swelling and hearing loss.    Eyes: Negative.  Negative for photophobia, pain, discharge and itching.   Respiratory: Negative.  Negative for cough, choking, chest  tightness and shortness of breath.    Cardiovascular: Negative.  Negative for chest pain.   Gastrointestinal: Negative.  Negative for abdominal pain.   Genitourinary: Negative.  Negative for difficulty urinating, discharge, dysuria, enuresis, genital sores and hematuria.   Musculoskeletal: Negative.  Negative for arthralgias, back pain, gait problem and joint swelling.   Skin: Negative.  Negative for color change, pallor and rash.   Neurological: Negative for headaches.   Psychiatric/Behavioral: Positive for agitation, behavioral problems, confusion and suicidal ideas. Negative for hallucinations, homicidal ideas and paranoia. The patient is nervous/anxious. The patient does not have insomnia.    All other systems reviewed and are negative.      Past Medical History:   Diagnosis Date   • Bipolar 1 disorder (CMS/[facility])    • Cancer (CMS/[facility])     testicular   • Depression    • Diabetes mellitus (CMS/[facility])    • Hypertension        No Known Allergies    Past Surgical History:   Procedure Laterality Date   • TESTICLE SURGERY     • TESTICLE SURGERY         History reviewed. No pertinent family history.    Social History     Socioeconomic History   • Marital status: Single     Spouse name: Not on file   • Number of children: Not on file   • Years of education: Not on file   • Highest education level: Not on file   Tobacco Use   • Smoking status: Never Smoker   • Smokeless tobacco: Current User     Types: Chew   Substance and Sexual Activity   • Alcohol use: No   • Drug use: Yes     Types: Methamphetamines     Comment: LAST USE 2 DAYS AGO           Objective   Physical Exam  Vitals and nursing note reviewed.   Constitutional:       General: He is not in acute distress.     Appearance: Normal appearance. He is normal weight. He is not ill-appearing, toxic-appearing or diaphoretic.   HENT:      Head: Normocephalic and atraumatic.      Right Ear: Tympanic membrane, ear canal and external ear normal. There is no impacted cerumen.       Left Ear: Tympanic membrane, ear canal and external ear normal. There is no impacted cerumen.      Nose: Nose normal. No congestion or rhinorrhea.      Mouth/Throat:      Mouth: Mucous membranes are moist.      Pharynx: Oropharynx is clear. No oropharyngeal exudate or posterior oropharyngeal erythema.   Eyes:      General: No scleral icterus.        Right eye: No discharge.         Left eye: No discharge.      Extraocular Movements: Extraocular movements intact.      Conjunctiva/sclera: Conjunctivae normal.      Pupils: Pupils are equal, round, and reactive to light.   Neck:      Vascular: No carotid bruit.   Cardiovascular:      Rate and Rhythm: Normal rate and regular rhythm.      Pulses: Normal pulses.      Heart sounds: Normal heart sounds. No murmur heard.   No friction rub. No gallop.    Pulmonary:      Effort: Pulmonary effort is normal. No respiratory distress.      Breath sounds: Normal breath sounds. No stridor. No wheezing, rhonchi or rales.   Chest:      Chest wall: No tenderness.   Abdominal:      General: Abdomen is flat. Bowel sounds are normal. There is no distension.      Palpations: Abdomen is soft. There is no mass.      Tenderness: There is no abdominal tenderness. There is no right CVA tenderness, left CVA tenderness, guarding or rebound.      Hernia: No hernia is present.   Musculoskeletal:         General: No swelling, tenderness, deformity or signs of injury. Normal range of motion.      Cervical back: Normal range of motion. No rigidity or tenderness.      Right lower leg: No edema.      Left lower leg: No edema.   Lymphadenopathy:      Cervical: No cervical adenopathy.   Skin:     General: Skin is warm and dry.      Capillary Refill: Capillary refill takes less than 2 seconds.      Coloration: Skin is not jaundiced or pale.      Findings: No bruising, erythema, lesion or rash.   Neurological:      General: No focal deficit present.      Mental Status: He is alert and oriented to  person, place, and time. Mental status is at baseline.      Cranial Nerves: No cranial nerve deficit.      Sensory: No sensory deficit.      Motor: No weakness.      Coordination: Coordination normal.      Gait: Gait normal.      Deep Tendon Reflexes: Reflexes normal.   Psychiatric:         Mood and Affect: Mood normal.         Behavior: Behavior is agitated.         Thought Content: Thought content includes suicidal ideation. Thought content includes suicidal plan.         Judgment: Judgment normal.         Procedures           ED Course                                           MDM    Final diagnoses:   Suicidal ideation       ED Disposition  ED Disposition     ED Disposition Condition Comment    Transfer to Another Facility             No follow-up provider specified.       Medication List      No changes were made to your prescriptions during this visit.          Aureliano Bloom PA-C  04/26/21 0013

## 2021-04-25 ENCOUNTER — HOSPITAL ENCOUNTER (INPATIENT)
Facility: HOSPITAL | Age: 51
LOS: 2 days | Discharge: HOME | End: 2021-04-27
Attending: PSYCHIATRY & NEUROLOGY | Admitting: PSYCHIATRY & NEUROLOGY

## 2021-04-25 PROBLEM — F15.20 METHAMPHETAMINE USE DISORDER, SEVERE: Status: ACTIVE | Noted: 2021-04-25

## 2021-04-25 PROBLEM — F32.9 MAJOR DEPRESSION: Status: ACTIVE | Noted: 2021-04-25

## 2021-04-25 PROBLEM — F33.8 OTHER RECURRENT DEPRESSIVE DISORDERS (HCC): Status: ACTIVE | Noted: 2021-04-25

## 2021-04-25 PROBLEM — F29 PSYCHOSIS (HCC): Status: ACTIVE | Noted: 2021-04-25

## 2021-04-25 LAB
FLUAV RNA RESP QL NAA+PROBE: NOT DETECTED
FLUBV RNA RESP QL NAA+PROBE: NOT DETECTED
SARS-COV-2 RNA RESP QL NAA+PROBE: NOT DETECTED

## 2021-04-25 PROCEDURE — 87636 SARSCOV2 & INF A&B AMP PRB: CPT | Performed by: PSYCHIATRY & NEUROLOGY

## 2021-04-25 PROCEDURE — 25010000002 LORAZEPAM PER 2 MG: Performed by: PSYCHIATRY & NEUROLOGY

## 2021-04-25 PROCEDURE — 93005 ELECTROCARDIOGRAM TRACING: CPT | Performed by: PSYCHIATRY & NEUROLOGY

## 2021-04-25 PROCEDURE — 99223 1ST HOSP IP/OBS HIGH 75: CPT | Performed by: PSYCHIATRY & NEUROLOGY

## 2021-04-25 RX ORDER — HYDROXYZINE 50 MG/1
50 TABLET, FILM COATED ORAL EVERY 6 HOURS PRN
Status: DISCONTINUED | OUTPATIENT
Start: 2021-04-25 | End: 2021-04-27 | Stop reason: HOSPADM

## 2021-04-25 RX ORDER — ONDANSETRON 4 MG/1
4 TABLET, FILM COATED ORAL EVERY 6 HOURS PRN
Status: DISCONTINUED | OUTPATIENT
Start: 2021-04-25 | End: 2021-04-27 | Stop reason: HOSPADM

## 2021-04-25 RX ORDER — BENZTROPINE MESYLATE 1 MG/ML
1 INJECTION INTRAMUSCULAR; INTRAVENOUS ONCE AS NEEDED
Status: DISCONTINUED | OUTPATIENT
Start: 2021-04-25 | End: 2021-04-27 | Stop reason: HOSPADM

## 2021-04-25 RX ORDER — ALUMINA, MAGNESIA, AND SIMETHICONE 2400; 2400; 240 MG/30ML; MG/30ML; MG/30ML
15 SUSPENSION ORAL EVERY 6 HOURS PRN
Status: DISCONTINUED | OUTPATIENT
Start: 2021-04-25 | End: 2021-04-27 | Stop reason: HOSPADM

## 2021-04-25 RX ORDER — FAMOTIDINE 20 MG/1
20 TABLET, FILM COATED ORAL 2 TIMES DAILY PRN
Status: DISCONTINUED | OUTPATIENT
Start: 2021-04-25 | End: 2021-04-27 | Stop reason: HOSPADM

## 2021-04-25 RX ORDER — BENZTROPINE MESYLATE 1 MG/1
2 TABLET ORAL ONCE AS NEEDED
Status: DISCONTINUED | OUTPATIENT
Start: 2021-04-25 | End: 2021-04-27 | Stop reason: HOSPADM

## 2021-04-25 RX ORDER — IBUPROFEN 400 MG/1
400 TABLET ORAL EVERY 6 HOURS PRN
Status: DISCONTINUED | OUTPATIENT
Start: 2021-04-25 | End: 2021-04-27 | Stop reason: HOSPADM

## 2021-04-25 RX ORDER — ARIPIPRAZOLE 10 MG/1
5 TABLET ORAL DAILY
Status: DISCONTINUED | OUTPATIENT
Start: 2021-04-25 | End: 2021-04-26

## 2021-04-25 RX ORDER — LOPERAMIDE HYDROCHLORIDE 2 MG/1
2 CAPSULE ORAL
Status: DISCONTINUED | OUTPATIENT
Start: 2021-04-25 | End: 2021-04-27 | Stop reason: HOSPADM

## 2021-04-25 RX ORDER — QUETIAPINE FUMARATE 100 MG/1
50 TABLET, FILM COATED ORAL EVERY 12 HOURS SCHEDULED
Status: DISCONTINUED | OUTPATIENT
Start: 2021-04-25 | End: 2021-04-25

## 2021-04-25 RX ORDER — LORAZEPAM 2 MG/ML
1 INJECTION INTRAMUSCULAR ONCE
Status: COMPLETED | OUTPATIENT
Start: 2021-04-25 | End: 2021-04-25

## 2021-04-25 RX ORDER — ECHINACEA PURPUREA EXTRACT 125 MG
2 TABLET ORAL AS NEEDED
Status: DISCONTINUED | OUTPATIENT
Start: 2021-04-25 | End: 2021-04-27 | Stop reason: HOSPADM

## 2021-04-25 RX ORDER — ACETAMINOPHEN 325 MG/1
650 TABLET ORAL EVERY 6 HOURS PRN
Status: DISCONTINUED | OUTPATIENT
Start: 2021-04-25 | End: 2021-04-25

## 2021-04-25 RX ORDER — BENZONATATE 100 MG/1
100 CAPSULE ORAL 3 TIMES DAILY PRN
Status: DISCONTINUED | OUTPATIENT
Start: 2021-04-25 | End: 2021-04-27 | Stop reason: HOSPADM

## 2021-04-25 RX ORDER — TRAZODONE HYDROCHLORIDE 50 MG/1
50 TABLET ORAL NIGHTLY PRN
Status: DISCONTINUED | OUTPATIENT
Start: 2021-04-25 | End: 2021-04-27 | Stop reason: HOSPADM

## 2021-04-25 RX ADMIN — TRAZODONE HYDROCHLORIDE 50 MG: 50 TABLET ORAL at 01:35

## 2021-04-25 RX ADMIN — LORAZEPAM 1 MG: 2 INJECTION INTRAMUSCULAR; INTRAVENOUS at 13:40

## 2021-04-25 RX ADMIN — HYDROXYZINE HYDROCHLORIDE 50 MG: 50 TABLET, FILM COATED ORAL at 01:35

## 2021-04-25 NOTE — ED NOTES
[facility] to [facility]. Pt escorted out w [facility] and security. Transferred at this time.      Paulette Pruitt, RN  04/24/21 5231

## 2021-04-25 NOTE — ED NOTES
Report to JANICE Fonseca at [facility]     Paulette Pruitt RN  04/24/21 3840
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
## 2021-04-26 PROCEDURE — 99233 SBSQ HOSP IP/OBS HIGH 50: CPT | Performed by: PSYCHIATRY & NEUROLOGY

## 2021-04-26 RX ORDER — ARIPIPRAZOLE 10 MG/1
10 TABLET ORAL DAILY
Status: DISCONTINUED | OUTPATIENT
Start: 2021-04-27 | End: 2021-04-27 | Stop reason: HOSPADM

## 2021-04-26 RX ORDER — LORAZEPAM 2 MG/ML
2 INJECTION INTRAMUSCULAR ONCE AS NEEDED
Status: DISCONTINUED | OUTPATIENT
Start: 2021-04-26 | End: 2021-04-27 | Stop reason: HOSPADM

## 2021-04-26 RX ADMIN — TRAZODONE HYDROCHLORIDE 50 MG: 50 TABLET ORAL at 21:21

## 2021-04-26 RX ADMIN — NICOTINE POLACRILEX 2 MG: 2 GUM, CHEWING BUCCAL at 16:18

## 2021-04-27 VITALS
RESPIRATION RATE: 18 BRPM | HEART RATE: 73 BPM | OXYGEN SATURATION: 96 % | DIASTOLIC BLOOD PRESSURE: 86 MMHG | WEIGHT: 117.6 LBS | SYSTOLIC BLOOD PRESSURE: 153 MMHG | BODY MASS INDEX: 18.9 KG/M2 | TEMPERATURE: 99.1 F | HEIGHT: 66 IN

## 2021-04-27 PROCEDURE — 99238 HOSP IP/OBS DSCHRG MGMT 30/<: CPT | Performed by: PSYCHIATRY & NEUROLOGY

## 2021-04-27 RX ORDER — ARIPIPRAZOLE 10 MG/1
10 TABLET ORAL DAILY
Qty: 30 TABLET | Refills: 0 | Status: SHIPPED | OUTPATIENT
Start: 2021-04-28 | End: 2021-05-10 | Stop reason: HOSPADM

## 2021-04-27 RX ADMIN — ARIPIPRAZOLE 10 MG: 10 TABLET ORAL at 09:58

## 2021-04-28 LAB
QT INTERVAL: 400 MS
QTC INTERVAL: 476 MS

## 2021-05-06 ENCOUNTER — APPOINTMENT (OUTPATIENT)
Dept: CT IMAGING | Facility: HOSPITAL | Age: 51
End: 2021-05-06

## 2021-05-06 ENCOUNTER — APPOINTMENT (OUTPATIENT)
Dept: GENERAL RADIOLOGY | Facility: HOSPITAL | Age: 51
End: 2021-05-06

## 2021-05-06 ENCOUNTER — HOSPITAL ENCOUNTER (INPATIENT)
Facility: HOSPITAL | Age: 51
LOS: 4 days | Discharge: HOME | End: 2021-05-10
Attending: EMERGENCY MEDICINE | Admitting: INTERNAL MEDICINE

## 2021-05-06 DIAGNOSIS — J18.9 COMMUNITY ACQUIRED PNEUMONIA OF RIGHT LUNG, UNSPECIFIED PART OF LUNG: Primary | ICD-10-CM

## 2021-05-06 DIAGNOSIS — E87.1 HYPONATREMIA: ICD-10-CM

## 2021-05-06 PROBLEM — D75.839 THROMBOCYTOSIS: Status: ACTIVE | Noted: 2021-05-06

## 2021-05-06 PROBLEM — F19.90 IV DRUG USER: Status: ACTIVE | Noted: 2021-05-06

## 2021-05-06 PROBLEM — F17.210 TOBACCO DEPENDENCE DUE TO CIGARETTES: Status: ACTIVE | Noted: 2021-05-06

## 2021-05-06 LAB
ALBUMIN SERPL-MCNC: 3.3 G/DL (ref 3.5–5.2)
ALBUMIN/GLOB SERPL: 0.8 G/DL
ALP SERPL-CCNC: 126 U/L (ref 39–117)
ALT SERPL W P-5'-P-CCNC: 9 U/L (ref 1–41)
AMPHET+METHAMPHET UR QL: POSITIVE
AMPHETAMINES UR QL: POSITIVE
ANION GAP SERPL CALCULATED.3IONS-SCNC: 11.2 MMOL/L (ref 5–15)
AST SERPL-CCNC: 15 U/L (ref 1–40)
BACTERIA BLD CULT: ABNORMAL
BARBITURATES UR QL SCN: NEGATIVE
BENZODIAZ UR QL SCN: NEGATIVE
BILIRUB SERPL-MCNC: 0.8 MG/DL (ref 0–1.2)
BUN SERPL-MCNC: 12 MG/DL (ref 6–20)
BUN/CREAT SERPL: 12.1 (ref 7–25)
BUPRENORPHINE SERPL-MCNC: NEGATIVE NG/ML
CALCIUM SPEC-SCNC: 8.8 MG/DL (ref 8.6–10.5)
CANNABINOIDS SERPL QL: NEGATIVE
CHLORIDE SERPL-SCNC: 84 MMOL/L (ref 98–107)
CO2 SERPL-SCNC: 26.8 MMOL/L (ref 22–29)
COCAINE UR QL: NEGATIVE
CREAT SERPL-MCNC: 0.99 MG/DL (ref 0.76–1.27)
CRP SERPL-MCNC: 32.67 MG/DL (ref 0–0.5)
DEPRECATED RDW RBC AUTO: 42.2 FL (ref 37–54)
ERYTHROCYTE [DISTWIDTH] IN BLOOD BY AUTOMATED COUNT: 13.2 % (ref 12.3–15.4)
GFR SERPL CREATININE-BSD FRML MDRD: 81 ML/MIN/1.73
GLOBULIN UR ELPH-MCNC: 4 GM/DL
GLUCOSE SERPL-MCNC: 114 MG/DL (ref 65–99)
HBA1C MFR BLD: 5.5 % (ref 4.8–5.6)
HCT VFR BLD AUTO: 38.2 % (ref 37.5–51)
HGB BLD-MCNC: 12.9 G/DL (ref 13–17.7)
HOLD SPECIMEN: NORMAL
L PNEUMO1 AG UR QL IA: NEGATIVE
LYMPHOCYTES # BLD MANUAL: 2.62 10*3/MM3 (ref 0.7–3.1)
LYMPHOCYTES NFR BLD MANUAL: 4 % (ref 5–12)
LYMPHOCYTES NFR BLD MANUAL: 6 % (ref 19.6–45.3)
MCH RBC QN AUTO: 29.7 PG (ref 26.6–33)
MCHC RBC AUTO-ENTMCNC: 33.8 G/DL (ref 31.5–35.7)
MCV RBC AUTO: 88 FL (ref 79–97)
METHADONE UR QL SCN: NEGATIVE
MONOCYTES # BLD AUTO: 1.74 10*3/MM3 (ref 0.1–0.9)
NEUTROPHILS # BLD AUTO: 39.23 10*3/MM3 (ref 1.7–7)
NEUTROPHILS NFR BLD MANUAL: 90 % (ref 42.7–76)
NRBC SPEC MANUAL: 1 /100 WBC (ref 0–0.2)
OPIATES UR QL: NEGATIVE
OSMOLALITY SERPL: 252 MOSM/KG (ref 275–300)
OSMOLALITY UR: 478 MOSM/KG
OXYCODONE UR QL SCN: NEGATIVE
PCP UR QL SCN: NEGATIVE
PLATELET # BLD AUTO: 452 10*3/MM3 (ref 140–450)
PMV BLD AUTO: 8.2 FL (ref 6–12)
POTASSIUM SERPL-SCNC: 4.3 MMOL/L (ref 3.5–5.2)
PROCALCITONIN SERPL-MCNC: 3.24 NG/ML (ref 0–0.25)
PROPOXYPH UR QL: NEGATIVE
PROT SERPL-MCNC: 7.3 G/DL (ref 6–8.5)
RBC # BLD AUTO: 4.34 10*6/MM3 (ref 4.14–5.8)
RBC MORPH BLD: NORMAL
S PNEUM AG SPEC QL LA: NEGATIVE
SARS-COV-2 RNA PNL SPEC NAA+PROBE: NOT DETECTED
SCAN SLIDE: NORMAL
SMALL PLATELETS BLD QL SMEAR: ABNORMAL
SMALL PLATELETS BLD QL SMEAR: NORMAL
SODIUM SERPL-SCNC: 122 MMOL/L (ref 136–145)
SODIUM UR-SCNC: <20 MMOL/L
TRICYCLICS UR QL SCN: NEGATIVE
TROPONIN T SERPL-MCNC: <0.01 NG/ML (ref 0–0.03)
WBC # BLD AUTO: 43.59 10*3/MM3 (ref 3.4–10.8)
WBC MORPH BLD: NORMAL
WHOLE BLOOD HOLD SPECIMEN: NORMAL
WHOLE BLOOD HOLD SPECIMEN: NORMAL

## 2021-05-06 PROCEDURE — 99285 EMERGENCY DEPT VISIT HI MDM: CPT

## 2021-05-06 PROCEDURE — 87150 DNA/RNA AMPLIFIED PROBE: CPT | Performed by: EMERGENCY MEDICINE

## 2021-05-06 PROCEDURE — 86738 MYCOPLASMA ANTIBODY: CPT | Performed by: EMERGENCY MEDICINE

## 2021-05-06 PROCEDURE — 71045 X-RAY EXAM CHEST 1 VIEW: CPT

## 2021-05-06 PROCEDURE — 87186 SC STD MICRODIL/AGAR DIL: CPT | Performed by: EMERGENCY MEDICINE

## 2021-05-06 PROCEDURE — 84484 ASSAY OF TROPONIN QUANT: CPT | Performed by: EMERGENCY MEDICINE

## 2021-05-06 PROCEDURE — 87635 SARS-COV-2 COVID-19 AMP PRB: CPT | Performed by: EMERGENCY MEDICINE

## 2021-05-06 PROCEDURE — 83935 ASSAY OF URINE OSMOLALITY: CPT | Performed by: EMERGENCY MEDICINE

## 2021-05-06 PROCEDURE — 83930 ASSAY OF BLOOD OSMOLALITY: CPT | Performed by: EMERGENCY MEDICINE

## 2021-05-06 PROCEDURE — 36415 COLL VENOUS BLD VENIPUNCTURE: CPT

## 2021-05-06 PROCEDURE — 93005 ELECTROCARDIOGRAM TRACING: CPT | Performed by: EMERGENCY MEDICINE

## 2021-05-06 PROCEDURE — 84300 ASSAY OF URINE SODIUM: CPT | Performed by: EMERGENCY MEDICINE

## 2021-05-06 PROCEDURE — 87040 BLOOD CULTURE FOR BACTERIA: CPT | Performed by: EMERGENCY MEDICINE

## 2021-05-06 PROCEDURE — 87899 AGENT NOS ASSAY W/OPTIC: CPT | Performed by: EMERGENCY MEDICINE

## 2021-05-06 PROCEDURE — 80053 COMPREHEN METABOLIC PANEL: CPT | Performed by: EMERGENCY MEDICINE

## 2021-05-06 PROCEDURE — 25010000002 VANCOMYCIN 1 G RECONSTITUTED SOLUTION 1 EACH VIAL: Performed by: EMERGENCY MEDICINE

## 2021-05-06 PROCEDURE — 80306 DRUG TEST PRSMV INSTRMNT: CPT | Performed by: EMERGENCY MEDICINE

## 2021-05-06 PROCEDURE — 85007 BL SMEAR W/DIFF WBC COUNT: CPT | Performed by: EMERGENCY MEDICINE

## 2021-05-06 PROCEDURE — 25010000002 AZITHROMYCIN 500 MG/250 ML: Performed by: EMERGENCY MEDICINE

## 2021-05-06 PROCEDURE — 99223 1ST HOSP IP/OBS HIGH 75: CPT | Performed by: EMERGENCY MEDICINE

## 2021-05-06 PROCEDURE — 84145 PROCALCITONIN (PCT): CPT | Performed by: EMERGENCY MEDICINE

## 2021-05-06 PROCEDURE — 87641 MR-STAPH DNA AMP PROBE: CPT | Performed by: INTERNAL MEDICINE

## 2021-05-06 PROCEDURE — 83036 HEMOGLOBIN GLYCOSYLATED A1C: CPT | Performed by: EMERGENCY MEDICINE

## 2021-05-06 PROCEDURE — 25010000002 CEFTRIAXONE SODIUM-DEXTROSE 1-3.74 GM-%(50ML) RECONSTITUTED SOLUTION: Performed by: EMERGENCY MEDICINE

## 2021-05-06 PROCEDURE — 85025 COMPLETE CBC W/AUTO DIFF WBC: CPT | Performed by: EMERGENCY MEDICINE

## 2021-05-06 PROCEDURE — 71275 CT ANGIOGRAPHY CHEST: CPT

## 2021-05-06 PROCEDURE — 25010000002 ONDANSETRON PER 1 MG: Performed by: EMERGENCY MEDICINE

## 2021-05-06 PROCEDURE — 86140 C-REACTIVE PROTEIN: CPT | Performed by: EMERGENCY MEDICINE

## 2021-05-06 PROCEDURE — 25010000002 IOPAMIDOL 61 % SOLUTION: Performed by: EMERGENCY MEDICINE

## 2021-05-06 RX ORDER — ACETAMINOPHEN 325 MG/1
650 TABLET ORAL EVERY 4 HOURS PRN
Status: DISCONTINUED | OUTPATIENT
Start: 2021-05-06 | End: 2021-05-10 | Stop reason: HOSPADM

## 2021-05-06 RX ORDER — SODIUM CHLORIDE 0.9 % (FLUSH) 0.9 %
10 SYRINGE (ML) INJECTION AS NEEDED
Status: DISCONTINUED | OUTPATIENT
Start: 2021-05-06 | End: 2021-05-10 | Stop reason: HOSPADM

## 2021-05-06 RX ORDER — ONDANSETRON 2 MG/ML
4 INJECTION INTRAMUSCULAR; INTRAVENOUS EVERY 6 HOURS PRN
Status: DISCONTINUED | OUTPATIENT
Start: 2021-05-06 | End: 2021-05-10 | Stop reason: HOSPADM

## 2021-05-06 RX ORDER — CEFTRIAXONE 2 G/50ML
2 INJECTION, SOLUTION INTRAVENOUS EVERY 24 HOURS
Status: DISCONTINUED | OUTPATIENT
Start: 2021-05-07 | End: 2021-05-10 | Stop reason: HOSPADM

## 2021-05-06 RX ORDER — QUETIAPINE FUMARATE 200 MG/1
200 TABLET, FILM COATED ORAL NIGHTLY
Status: ON HOLD | COMMUNITY
End: 2021-05-10 | Stop reason: SDUPTHER

## 2021-05-06 RX ORDER — ACETAMINOPHEN 160 MG/5ML
650 SOLUTION ORAL EVERY 4 HOURS PRN
Status: DISCONTINUED | OUTPATIENT
Start: 2021-05-06 | End: 2021-05-10 | Stop reason: HOSPADM

## 2021-05-06 RX ORDER — CEFTRIAXONE 1 G/50ML
1 INJECTION, SOLUTION INTRAVENOUS ONCE
Status: COMPLETED | OUTPATIENT
Start: 2021-05-06 | End: 2021-05-06

## 2021-05-06 RX ORDER — SODIUM CHLORIDE 0.9 % (FLUSH) 0.9 %
10 SYRINGE (ML) INJECTION EVERY 12 HOURS SCHEDULED
Status: DISCONTINUED | OUTPATIENT
Start: 2021-05-06 | End: 2021-05-10 | Stop reason: HOSPADM

## 2021-05-06 RX ORDER — CEFTRIAXONE 1 G/50ML
1 INJECTION, SOLUTION INTRAVENOUS EVERY 24 HOURS
Status: DISCONTINUED | OUTPATIENT
Start: 2021-05-07 | End: 2021-05-06

## 2021-05-06 RX ORDER — BENZONATATE 100 MG/1
100 CAPSULE ORAL 3 TIMES DAILY PRN
Status: DISCONTINUED | OUTPATIENT
Start: 2021-05-06 | End: 2021-05-10 | Stop reason: HOSPADM

## 2021-05-06 RX ORDER — L.ACID,PARA/B.BIFIDUM/S.THERM 8B CELL
1 CAPSULE ORAL 2 TIMES DAILY
Status: DISCONTINUED | OUTPATIENT
Start: 2021-05-06 | End: 2021-05-10 | Stop reason: HOSPADM

## 2021-05-06 RX ORDER — NICOTINE 21 MG/24HR
1 PATCH, TRANSDERMAL 24 HOURS TRANSDERMAL EVERY 24 HOURS
Status: DISCONTINUED | OUTPATIENT
Start: 2021-05-06 | End: 2021-05-10 | Stop reason: HOSPADM

## 2021-05-06 RX ORDER — QUETIAPINE FUMARATE 100 MG/1
200 TABLET, FILM COATED ORAL NIGHTLY
Status: DISCONTINUED | OUTPATIENT
Start: 2021-05-06 | End: 2021-05-10 | Stop reason: HOSPADM

## 2021-05-06 RX ORDER — ACETAMINOPHEN 650 MG/1
650 SUPPOSITORY RECTAL EVERY 4 HOURS PRN
Status: DISCONTINUED | OUTPATIENT
Start: 2021-05-06 | End: 2021-05-10 | Stop reason: HOSPADM

## 2021-05-06 RX ADMIN — SODIUM CHLORIDE, PRESERVATIVE FREE 10 ML: 5 INJECTION INTRAVENOUS at 08:57

## 2021-05-06 RX ADMIN — BENZONATATE 100 MG: 100 CAPSULE, LIQUID FILLED ORAL at 10:21

## 2021-05-06 RX ADMIN — AZITHROMYCIN 500 MG: 500 INJECTION, POWDER, LYOPHILIZED, FOR SOLUTION INTRAVENOUS at 05:55

## 2021-05-06 RX ADMIN — QUETIAPINE FUMARATE 200 MG: 100 TABLET ORAL at 20:34

## 2021-05-06 RX ADMIN — CEFTRIAXONE 1 G: 1 INJECTION, SOLUTION INTRAVENOUS at 04:57

## 2021-05-06 RX ADMIN — IOPAMIDOL 100 ML: 612 INJECTION, SOLUTION INTRAVENOUS at 04:42

## 2021-05-06 RX ADMIN — VANCOMYCIN HYDROCHLORIDE 1000 MG: 1 INJECTION, POWDER, LYOPHILIZED, FOR SOLUTION INTRAVENOUS at 20:34

## 2021-05-06 RX ADMIN — BENZONATATE 100 MG: 100 CAPSULE, LIQUID FILLED ORAL at 23:16

## 2021-05-06 RX ADMIN — ONDANSETRON 4 MG: 2 INJECTION INTRAMUSCULAR; INTRAVENOUS at 10:21

## 2021-05-06 RX ADMIN — SODIUM CHLORIDE 1000 ML: 9 INJECTION, SOLUTION INTRAVENOUS at 02:31

## 2021-05-06 RX ADMIN — SODIUM CHLORIDE, PRESERVATIVE FREE 10 ML: 5 INJECTION INTRAVENOUS at 20:43

## 2021-05-06 RX ADMIN — Medication 1 CAPSULE: at 20:34

## 2021-05-07 ENCOUNTER — APPOINTMENT (OUTPATIENT)
Dept: CARDIOLOGY | Facility: HOSPITAL | Age: 51
End: 2021-05-07

## 2021-05-07 LAB
ANION GAP SERPL CALCULATED.3IONS-SCNC: 7.7 MMOL/L (ref 5–15)
BH CV ECHO MEAS - % IVS THICK: 21.4 %
BH CV ECHO MEAS - % LVPW THICK: 47.1 %
BH CV ECHO MEAS - AO MAX PG (FULL): 7.3 MMHG
BH CV ECHO MEAS - AO MAX PG: 12 MMHG
BH CV ECHO MEAS - AO MEAN PG (FULL): 4 MMHG
BH CV ECHO MEAS - AO MEAN PG: 6 MMHG
BH CV ECHO MEAS - AO ROOT AREA (BSA CORRECTED): 2.1
BH CV ECHO MEAS - AO ROOT AREA: 8.9 CM^2
BH CV ECHO MEAS - AO ROOT DIAM: 3.4 CM
BH CV ECHO MEAS - AO V2 MAX: 174 CM/SEC
BH CV ECHO MEAS - AO V2 MEAN: 104 CM/SEC
BH CV ECHO MEAS - AO V2 VTI: 28.1 CM
BH CV ECHO MEAS - AVA(I,A): 1.9 CM^2
BH CV ECHO MEAS - AVA(I,D): 1.9 CM^2
BH CV ECHO MEAS - AVA(V,A): 1.7 CM^2
BH CV ECHO MEAS - AVA(V,D): 1.7 CM^2
BH CV ECHO MEAS - BSA(HAYCOCK): 1.6 M^2
BH CV ECHO MEAS - BSA: 1.6 M^2
BH CV ECHO MEAS - BZI_BMI: 19.4 KILOGRAMS/M^2
BH CV ECHO MEAS - BZI_METRIC_HEIGHT: 167.6 CM
BH CV ECHO MEAS - BZI_METRIC_WEIGHT: 54.4 KG
BH CV ECHO MEAS - EDV(CUBED): 40.4 ML
BH CV ECHO MEAS - EDV(MOD-SP2): 103 ML
BH CV ECHO MEAS - EDV(MOD-SP4): 87 ML
BH CV ECHO MEAS - EDV(TEICH): 48.5 ML
BH CV ECHO MEAS - EF(CUBED): 81.9 %
BH CV ECHO MEAS - EF(MOD-BP): 64.1 %
BH CV ECHO MEAS - EF(MOD-SP2): 72.9 %
BH CV ECHO MEAS - EF(MOD-SP4): 56.3 %
BH CV ECHO MEAS - EF(TEICH): 75.7 %
BH CV ECHO MEAS - ESV(CUBED): 7.3 ML
BH CV ECHO MEAS - ESV(MOD-SP2): 27.9 ML
BH CV ECHO MEAS - ESV(MOD-SP4): 38 ML
BH CV ECHO MEAS - ESV(TEICH): 11.8 ML
BH CV ECHO MEAS - FS: 43.4 %
BH CV ECHO MEAS - IVS/LVPW: 0.98
BH CV ECHO MEAS - IVSD: 1.2 CM
BH CV ECHO MEAS - IVSS: 1.4 CM
BH CV ECHO MEAS - LA DIMENSION: 2.9 CM
BH CV ECHO MEAS - LA/AO: 0.85
BH CV ECHO MEAS - LAD MAJOR: 5.9 CM
BH CV ECHO MEAS - LAT PEAK E' VEL: 15.3 CM/SEC
BH CV ECHO MEAS - LATERAL E/E' RATIO: 4.6
BH CV ECHO MEAS - LV DIASTOLIC VOL/BSA (35-75): 54 ML/M^2
BH CV ECHO MEAS - LV MASS(C)D: 128.5 GRAMS
BH CV ECHO MEAS - LV MASS(C)DI: 79.8 GRAMS/M^2
BH CV ECHO MEAS - LV MASS(C)S: 105.5 GRAMS
BH CV ECHO MEAS - LV MASS(C)SI: 65.6 GRAMS/M^2
BH CV ECHO MEAS - LV MAX PG: 4.7 MMHG
BH CV ECHO MEAS - LV MEAN PG: 2 MMHG
BH CV ECHO MEAS - LV SYSTOLIC VOL/BSA (12-30): 23.6 ML/M^2
BH CV ECHO MEAS - LV V1 MAX: 108 CM/SEC
BH CV ECHO MEAS - LV V1 MEAN: 67.4 CM/SEC
BH CV ECHO MEAS - LV V1 VTI: 19.1 CM
BH CV ECHO MEAS - LVIDD: 3.4 CM
BH CV ECHO MEAS - LVIDS: 1.9 CM
BH CV ECHO MEAS - LVLD AP2: 7.6 CM
BH CV ECHO MEAS - LVLD AP4: 7.9 CM
BH CV ECHO MEAS - LVLS AP2: 6.1 CM
BH CV ECHO MEAS - LVLS AP4: 6.9 CM
BH CV ECHO MEAS - LVOT AREA (M): 2.8 CM^2
BH CV ECHO MEAS - LVOT AREA: 2.8 CM^2
BH CV ECHO MEAS - LVOT DIAM: 1.9 CM
BH CV ECHO MEAS - LVPWD: 1.2 CM
BH CV ECHO MEAS - LVPWS: 1.8 CM
BH CV ECHO MEAS - MED PEAK E' VEL: 10.7 CM/SEC
BH CV ECHO MEAS - MEDIAL E/E' RATIO: 6.6
BH CV ECHO MEAS - MV A MAX VEL: 68.3 CM/SEC
BH CV ECHO MEAS - MV DEC TIME: 0.28 SEC
BH CV ECHO MEAS - MV E MAX VEL: 70.4 CM/SEC
BH CV ECHO MEAS - MV E/A: 1
BH CV ECHO MEAS - MV MAX PG: 2.8 MMHG
BH CV ECHO MEAS - MV MEAN PG: 1 MMHG
BH CV ECHO MEAS - MV V2 MAX: 84 CM/SEC
BH CV ECHO MEAS - MV V2 MEAN: 53.9 CM/SEC
BH CV ECHO MEAS - MV V2 VTI: 19.7 CM
BH CV ECHO MEAS - MVA(VTI): 2.7 CM^2
BH CV ECHO MEAS - PA ACC TIME: 0.12 SEC
BH CV ECHO MEAS - PA MAX PG (FULL): 3 MMHG
BH CV ECHO MEAS - PA MAX PG: 5.9 MMHG
BH CV ECHO MEAS - PA MEAN PG (FULL): 1 MMHG
BH CV ECHO MEAS - PA MEAN PG: 3 MMHG
BH CV ECHO MEAS - PA PR(ACCEL): 26.8 MMHG
BH CV ECHO MEAS - PA V2 MAX: 121 CM/SEC
BH CV ECHO MEAS - PA V2 MEAN: 80.7 CM/SEC
BH CV ECHO MEAS - PA V2 VTI: 20.3 CM
BH CV ECHO MEAS - PULM DIAS VEL: 44.3 CM/SEC
BH CV ECHO MEAS - PULM S/D: 1
BH CV ECHO MEAS - PULM SYS VEL: 45.4 CM/SEC
BH CV ECHO MEAS - RAP SYSTOLE: 3 MMHG
BH CV ECHO MEAS - RV MAX PG: 2.9 MMHG
BH CV ECHO MEAS - RV MEAN PG: 2 MMHG
BH CV ECHO MEAS - RV V1 MAX: 85 CM/SEC
BH CV ECHO MEAS - RV V1 MEAN: 56.7 CM/SEC
BH CV ECHO MEAS - RV V1 VTI: 18.6 CM
BH CV ECHO MEAS - SI(AO): 154.8 ML/M^2
BH CV ECHO MEAS - SI(CUBED): 20.5 ML/M^2
BH CV ECHO MEAS - SI(LVOT): 33.3 ML/M^2
BH CV ECHO MEAS - SI(MOD-SP2): 46.7 ML/M^2
BH CV ECHO MEAS - SI(MOD-SP4): 30.4 ML/M^2
BH CV ECHO MEAS - SI(TEICH): 22.8 ML/M^2
BH CV ECHO MEAS - SV(AO): 249.2 ML
BH CV ECHO MEAS - SV(CUBED): 33.1 ML
BH CV ECHO MEAS - SV(LVOT): 53.6 ML
BH CV ECHO MEAS - SV(MOD-SP2): 75.1 ML
BH CV ECHO MEAS - SV(MOD-SP4): 49 ML
BH CV ECHO MEAS - SV(TEICH): 36.7 ML
BH CV ECHO MEAS - TAPSE (>1.6): 2.4 CM
BH CV ECHO MEASUREMENTS AVERAGE E/E' RATIO: 5.42
BH CV XLRA - RV BASE: 3.8 CM
BH CV XLRA - RV LENGTH: 6.7 CM
BH CV XLRA - RV MID: 2.9 CM
BH CV XLRA - TDI S': 15.7 CM/SEC
BUN SERPL-MCNC: 16 MG/DL (ref 6–20)
BUN/CREAT SERPL: 19.3 (ref 7–25)
CALCIUM SPEC-SCNC: 9 MG/DL (ref 8.6–10.5)
CHLORIDE SERPL-SCNC: 96 MMOL/L (ref 98–107)
CO2 SERPL-SCNC: 28.3 MMOL/L (ref 22–29)
CREAT SERPL-MCNC: 0.83 MG/DL (ref 0.76–1.27)
DEPRECATED RDW RBC AUTO: 40.5 FL (ref 37–54)
ERYTHROCYTE [DISTWIDTH] IN BLOOD BY AUTOMATED COUNT: 13.1 % (ref 12.3–15.4)
GFR SERPL CREATININE-BSD FRML MDRD: 99 ML/MIN/1.73
GLUCOSE SERPL-MCNC: 94 MG/DL (ref 65–99)
HCT VFR BLD AUTO: 33.2 % (ref 37.5–51)
HGB BLD-MCNC: 11.2 G/DL (ref 13–17.7)
M PNEUMO IGG SER IA-ACNC: 146 U/ML (ref 0–99)
M PNEUMO IGM SER IA-ACNC: <770 U/ML (ref 0–769)
MAXIMAL PREDICTED HEART RATE: 172 BPM
MCH RBC QN AUTO: 28.9 PG (ref 26.6–33)
MCHC RBC AUTO-ENTMCNC: 33.7 G/DL (ref 31.5–35.7)
MCV RBC AUTO: 85.8 FL (ref 79–97)
MRSA DNA SPEC QL NAA+PROBE: ABNORMAL
PLATELET # BLD AUTO: 406 10*3/MM3 (ref 140–450)
PMV BLD AUTO: 8.8 FL (ref 6–12)
POTASSIUM SERPL-SCNC: 3.8 MMOL/L (ref 3.5–5.2)
RBC # BLD AUTO: 3.87 10*6/MM3 (ref 4.14–5.8)
SODIUM SERPL-SCNC: 132 MMOL/L (ref 136–145)
STRESS TARGET HR: 146 BPM
WBC # BLD AUTO: 21.44 10*3/MM3 (ref 3.4–10.8)

## 2021-05-07 PROCEDURE — 85027 COMPLETE CBC AUTOMATED: CPT | Performed by: INTERNAL MEDICINE

## 2021-05-07 PROCEDURE — 80048 BASIC METABOLIC PNL TOTAL CA: CPT | Performed by: INTERNAL MEDICINE

## 2021-05-07 PROCEDURE — 87040 BLOOD CULTURE FOR BACTERIA: CPT | Performed by: EMERGENCY MEDICINE

## 2021-05-07 PROCEDURE — 93306 TTE W/DOPPLER COMPLETE: CPT | Performed by: INTERNAL MEDICINE

## 2021-05-07 PROCEDURE — 25010000002 CEFTRIAXONE SODIUM-DEXTROSE 2-2.22 GM-%(50ML) RECONSTITUTED SOLUTION: Performed by: EMERGENCY MEDICINE

## 2021-05-07 PROCEDURE — 99232 SBSQ HOSP IP/OBS MODERATE 35: CPT | Performed by: INTERNAL MEDICINE

## 2021-05-07 PROCEDURE — 25010000002 AZITHROMYCIN 500 MG/250 ML: Performed by: EMERGENCY MEDICINE

## 2021-05-07 PROCEDURE — 93306 TTE W/DOPPLER COMPLETE: CPT

## 2021-05-07 RX ADMIN — Medication 1 CAPSULE: at 21:44

## 2021-05-07 RX ADMIN — CEFTRIAXONE 2 G: 2 INJECTION, SOLUTION INTRAVENOUS at 06:34

## 2021-05-07 RX ADMIN — QUETIAPINE FUMARATE 200 MG: 100 TABLET ORAL at 21:44

## 2021-05-07 RX ADMIN — Medication 1 CAPSULE: at 09:16

## 2021-05-07 RX ADMIN — AZITHROMYCIN 500 MG: 500 INJECTION, POWDER, LYOPHILIZED, FOR SOLUTION INTRAVENOUS at 07:56

## 2021-05-07 RX ADMIN — SODIUM CHLORIDE, PRESERVATIVE FREE 10 ML: 5 INJECTION INTRAVENOUS at 09:17

## 2021-05-07 RX ADMIN — MUPIROCIN: 20 OINTMENT TOPICAL at 01:10

## 2021-05-08 PROBLEM — E44.0 MODERATE MALNUTRITION (HCC): Status: ACTIVE | Noted: 2021-05-08

## 2021-05-08 PROCEDURE — 25010000002 AZITHROMYCIN 500 MG/250 ML: Performed by: EMERGENCY MEDICINE

## 2021-05-08 PROCEDURE — 99232 SBSQ HOSP IP/OBS MODERATE 35: CPT | Performed by: INTERNAL MEDICINE

## 2021-05-08 PROCEDURE — 25010000002 CEFTRIAXONE SODIUM-DEXTROSE 2-2.22 GM-%(50ML) RECONSTITUTED SOLUTION: Performed by: EMERGENCY MEDICINE

## 2021-05-08 RX ORDER — MULTIPLE VITAMINS W/ MINERALS TAB 9MG-400MCG
1 TAB ORAL DAILY
Status: DISCONTINUED | OUTPATIENT
Start: 2021-05-08 | End: 2021-05-10 | Stop reason: HOSPADM

## 2021-05-08 RX ORDER — AMINO ACIDS/PROTEIN HYDROLYS 15G-100/30
30 LIQUID (ML) ORAL 2 TIMES DAILY
Status: DISCONTINUED | OUTPATIENT
Start: 2021-05-08 | End: 2021-05-10 | Stop reason: HOSPADM

## 2021-05-08 RX ADMIN — QUETIAPINE FUMARATE 200 MG: 100 TABLET ORAL at 20:37

## 2021-05-08 RX ADMIN — MULTIPLE VITAMINS W/ MINERALS TAB 1 TABLET: TAB at 13:45

## 2021-05-08 RX ADMIN — MUPIROCIN: 20 OINTMENT TOPICAL at 12:00

## 2021-05-08 RX ADMIN — Medication 1 CAPSULE: at 20:47

## 2021-05-08 RX ADMIN — Medication 1 CAPSULE: at 09:45

## 2021-05-08 RX ADMIN — Medication 30 ML: at 12:21

## 2021-05-08 RX ADMIN — AZITHROMYCIN 500 MG: 500 INJECTION, POWDER, LYOPHILIZED, FOR SOLUTION INTRAVENOUS at 06:21

## 2021-05-08 RX ADMIN — CEFTRIAXONE 2 G: 2 INJECTION, SOLUTION INTRAVENOUS at 05:06

## 2021-05-08 RX ADMIN — MUPIROCIN: 20 OINTMENT TOPICAL at 20:48

## 2021-05-08 RX ADMIN — SODIUM CHLORIDE, PRESERVATIVE FREE 10 ML: 5 INJECTION INTRAVENOUS at 09:45

## 2021-05-08 NOTE — PLAN OF CARE
Goal Outcome Evaluation:  Plan of Care Reviewed With: patient  Progress: improving   Valentin denies pain and SOA. Ambulates independently. ABX continue. He is compliant with  plan of care.

## 2021-05-08 NOTE — PROGRESS NOTES
[facility]    Name:  Stefano Claire   Age:  50 y.o.  Sex:  male  :  1970  MRN:  [id number]   Visit Number:  [id number]  Admission Date:  2021  Date Of Service:  21  Primary Care Physician:  Provider, No Known     LOS: 2 days :    Chief Complaint:      Generalized weakness.    Subjective:    Mr. Claire was seen and examined this morning.  He is currently lying down on the bed and is comfortable at rest.  Denies any chest pain, nausea or vomiting.  He does have occasional cough but it has improved since admission.  No fevers since admission.  No significant overnight events.    Hospital Course:    Mr. Claire is a 50-year-old male with history of depression, IV drug abuse was admitted from the emergency room with symptoms of chest congestion, pleuritic chest pain and cough.  Blood work done in the emergency room revealed a white count of 44,000 with elevated procalcitonin and C-reactive protein.  His sodium was 122 but renal function was within normal limits and the troponin was negative.  CT PE protocol done in the emergency room was negative for pulmonary embolism but showed consolidation within the inferior aspect of the right upper lobe as well as right middle lobe and left lung base.  Patient was given IV fluids and was placed on IV antibiotics therapy with Rocephin and azithromycin.  Blood cultures were drawn in the emergency room.  Streptococcus and Legionella serology was negative.  2D echocardiogram was done on 2021 and it showed normal left ventricular systolic and diastolic functions.  Repeat blood cultures were done on 2021.  His nasal swab for MRSA was positive and he was placed on Bactroban ointment.    Review of Systems:     All systems were reviewed and negative except as mentioned in subjective, assessment and plan.    Vital Signs:    Temp:  [97.6 °F (36.4 °C)-99 °F (37.2 °C)] 98.2 °F (36.8 °C)  Heart Rate:  [70-80]  73  Resp:  [16-20] 18  BP: (103-143)/(64-96) 140/91    Intake and output:    I/O last 3 completed shifts:  In: 2396 [P.O.:1680; I.V.:416; IV Piggyback:300]  Out: 2550 [Urine:2550]  I/O this shift:  In: -   Out: 600 [Urine:600]    Physical Examination:    General Appearance:  Alert and cooperative.  Comfortable at rest.   Head:  Atraumatic and normocephalic.   Eyes: Conjunctivae and sclerae normal, no icterus. No pallor.   Throat: No oral lesions, no thrush, oral mucosa moist.   Neck: Supple, trachea midline, no thyromegaly.   Lungs:   Breath sounds heard bilaterally equally.  No crackles or wheezing. No Pleural rub or bronchial breathing.   Heart:  Normal S1 and S2, no murmur, no gallop, no rub. No JVD.   Abdomen:   Normal bowel sounds, no masses, no organomegaly. Soft, nontender, nondistended, no rebound tenderness.   Extremities: Supple, no edema, no cyanosis, no clubbing.   Skin: No bleeding or rash.   Neurologic: Alert and oriented x 3. No facial asymmetry. Moves all four limbs. No tremors.      Laboratory results:    Results from last 7 days   Lab Units 05/07/21  0429 05/06/21  0149   SODIUM mmol/L 132* 122*   POTASSIUM mmol/L 3.8 4.3   CHLORIDE mmol/L 96* 84*   CO2 mmol/L 28.3 26.8   BUN mg/dL 16 12   CREATININE mg/dL 0.83 0.99   CALCIUM mg/dL 9.0 8.8   BILIRUBIN mg/dL  --  0.8   ALK PHOS U/L  --  126*   ALT (SGPT) U/L  --  9   AST (SGOT) U/L  --  15   GLUCOSE mg/dL 94 114*     Results from last 7 days   Lab Units 05/07/21  0429 05/06/21  0149   WBC 10*3/mm3 21.44* 43.59*   HEMOGLOBIN g/dL 11.2* 12.9*   HEMATOCRIT % 33.2* 38.2   PLATELETS 10*3/mm3 406 452*         Results from last 7 days   Lab Units 05/06/21  0149   TROPONIN T ng/mL <0.010     I have reviewed the patient's laboratory results.    Radiology results:    Adult Transthoracic Echo Complete W/ Cont if Necessary Per Protocol    Result Date: 5/7/2021  1.  Normal left ventricular size and systolic function, LVEF 60-65%. 2.  Mild concentric LVH. 3.   Normal LV diastolic filling pattern. 4.  Normal right ventricular size and systolic function. 5.  Mild mitral regurgitation. 6.  Trace TR.     I have reviewed the patient's radiology reports.    Medication Review:     I have reviewed the patient's active and prn medications.     Problem List:      Hyponatremia    Methamphetamine use disorder, severe (CMS/HCC)    Community acquired pneumonia of right lung    IV drug user    Thrombocytosis (CMS/HCC)    Tobacco dependence due to cigarettes    Moderate malnutrition (CMS/[facility])    Assessment:    1.  Bilateral multifocal bacterial pneumonia, unable to classify further, POA.  2.  Hyponatremia, present on admission, improving.  3.  IV drug abuse.  4.  Tobacco dependence.  5.  Bipolar disorder.    Plan:    Mr. Claire is currently hemodynamically stable.  He is afebrile and will be continued on Rocephin and azithromycin.  His sodium has significantly improved and is currently 132.  Surveillance blood cultures done on 5/7/2021 have been negative so far.  Nasal swab was positive for MRSA and he is on Bactroban ointment.  Continue lactobacillus supplements.    His home medications will be continued including Seroquel.  He is on Lovenox for DVT prophylaxis.  Patient states that he is homeless and  are trying to arrange him into a shelter.  He is independent in daily activities and does not need PT.    DVT Prophylaxis: Lovenox.  Code Status: Full code.  Diet: Regular.  Discharge Plan: Pending.    Gordon Jules MD  05/08/21  12:56 EDT    Dictated utilizing Dragon dictation.

## 2021-05-08 NOTE — PROGRESS NOTES
"Adult Nutrition  Assessment/PES    Patient Name:  Stefano Claire  YOB: 1970  MRN: [id number]  Admit Date:  5/6/2021    Assessment Date:  5/8/2021    Comments:    Recommend:  1. Continue current diet order as medically appropriate and tolerated.  2. Encourage PO intake. Average intake ~38% x 6 meals.   3. RD ordered Boost Plus daily, Boost Puddig daily, Magic Cup daily, and Prostat BID.  4. Consider a multivitamin with minerals daily.  5. Continue to monitor and replace electrolytes PRN.   6. RD completed MSA with NFPE; pt meets criteria for moderate malnutrition with a BMI of 17.33.      RD to follow pt and available PRN.      Reason for Assessment     Row Name 05/08/21 1103          Reason for Assessment    Reason For Assessment  identified at risk by screening criteria     Diagnosis  metabolic state;substance use/abuse;pulmonary disease hyponatremia, drug abuse, PNA, bipolar disorder     Identified At Risk by Screening Criteria  BMI           Anthropometrics     Row Name 05/08/21 1106 05/08/21 0900       Anthropometrics    Height  167.6 cm (66\")  --    Weight  --  48.7 kg (107 lb 5.8 oz)       Ideal Body Weight (IBW)    Ideal Body Weight (IBW) (kg)  65.3  --    Row Name 05/08/21 0536          Anthropometrics    Weight  48.4 kg (106 lb 11.2 oz)         Labs/Tests/Procedures/Meds     Row Name 05/08/21 1105          Labs/Procedures/Meds    Lab Results Reviewed  reviewed, pertinent     Lab Results Comments  Low: Na, Cl, Alb; High: CRP, Procal        Medications    Pertinent Medications Reviewed  reviewed         Physical Findings     Row Name 05/08/21 1105          Physical Findings    Overall Physical Appearance  underweight     Skin  other (see comments) left posterior palm laceration         Estimated/Assessed Needs     Row Name 05/08/21 1106          Calculation Measurements    Weight Used For Calculations  48.7 kg (107 lb 5.8 oz)     Height  167.6 cm (66\")        Estimated/Assessed Needs    " Additional Documentation  Fluid Requirements (Group);Protein Requirements (Group);Calorie Requirements (Group);Fleming-St. Jeor Equation (Group)        Calorie Requirements    Estimated Calorie Requirement Comment  [id number]        Fleming-St. Jeor Equation    RMR (Fleming-St. Jeor Equation)  1299.75     Fleming-St. Jeor Activity Factors  -- 1.3 AF        Protein Requirements    Weight Used For Protein Calculations  48.7 kg (107 lb 5.8 oz)     Est Protein Requirement Amount (gms/kg)  2.0 gm protein 73-97 grams     Estimated Protein Requirements (gms/day)  97.4        Fluid Requirements    Fluid Requirements (mL/day)  -- 2739-2632 mL     Estimated Fluid Requirement Method  other (see comments) 1 mL/kcal         Nutrition Prescription Ordered     Row Name 05/08/21 1107          Nutrition Prescription PO    Current PO Diet  Regular         Evaluation of Received Nutrient/Fluid Intake     Row Name 05/08/21 1106          PO Evaluation    Number of Days PO Intake Evaluated  2 days     Number of Meals  6     % PO Intake  38           Malnutrition Severity Assessment     Row Name 05/08/21 1124          Malnutrition Severity Assessment    Malnutrition Type  Acute Disease or Injury - Related Malnutrition        Muscle Loss    Loss of Muscle Mass Findings  Moderate     [place] Region  None     Clavicle Bone Region  Moderate - some protrusion in females, visible in males     Acromion Bone Region  Moderate - acromion may slightly protrude     Scapular Bone Region  Moderate - mild depression, bones may show slightly     Dorsal Hand Region  None     Patellar Region  Moderate - patella more prominent, less muscle definition around patella     Anterior Thigh Region  None     Posterior Calf Region  Moderate - some roundness, slight firmness        Fat Loss    Subcutaneous Fat Loss Findings  Moderate     Orbital Region   Moderate -  somewhat hollowness, slightly dark circles     Upper Arm Region  Moderate - some fat tissue, not ample      Thoracic & Lumbar Region  Moderate - ribs visible with mild depressions, iliac crest somewhat prominent        Criteria Met (Must meet criteria for severity in at least 2 of these categories: M Wasting, Fat Loss, Fluid, Secondary Signs, Wt. Status, Intake)    Patient meets criteria for   Moderate (non-severe) Malnutrition           Problem/Interventions:  Problem 1     Row Name 05/08/21 1108          Nutrition Diagnoses Problem 1    Problem 1  Malnutrition     Etiology (related to)  Factors Affecting Nutrition     Appetite  Poor     Food Habit/Preferences  --     Signs/Symptoms (evidenced by)  BMI     BMI  17 - 17.9         Problem 2     Row Name 05/08/21 1108          Nutrition Diagnoses Problem 2    Problem 2  Increased Nutrient Needs     Micronutrient  Sodium     Etiology (related to)  Medical Diagnosis     Metabolic/ICU  Hyponatremia     Signs/Symptoms (evidenced by)  Biochemical     Specific Labs Noted  Na+             Intervention Goal     Row Name 05/08/21 1109          Intervention Goal    General  Meet nutritional needs for age/condition;Improved nutrition related lab(s)     PO  Meet estimated needs;Increase intake;PO intake (%)     PO Intake %  50 %     Weight  Appropriate weight gain         Nutrition Intervention     Row Name 05/08/21 1109          Nutrition Intervention    RD/Tech Action  Follow Tx progress;Encourage intake;Recommend/ordered     Recommended/Ordered  Supplement         Nutrition Prescription     Row Name 05/08/21 1109          Nutrition Prescription PO    PO Prescription  Begin/change supplement     Supplement  PRO liquid;Boost Plus;Magic Cup;Boost Pudding     Supplement Frequency  2 times a day;Daily     New PO Prescription Ordered?  Yes        Other Orders    Obtain Weight  Daily     Obtain Weight Ordered?  No, recommended     Supplement  Vitamin mineral supplement     Supplement Ordered?  No, recommended     Other  Continue to monitor and replace electrolytes PRN          Education/Evaluation     Row Name 05/08/21 1110          Education    Education  Will Instruct as appropriate        Monitor/Evaluation    Monitor  Per protocol;I&O;PO intake;Supplement intake;Pertinent labs;Weight;Skin status           Electronically signed by:  Andria Morris RD  05/08/21 11:48 EDT

## 2021-05-09 LAB
ANION GAP SERPL CALCULATED.3IONS-SCNC: 9.7 MMOL/L (ref 5–15)
BACTERIA SPEC AEROBE CULT: ABNORMAL
BUN SERPL-MCNC: 14 MG/DL (ref 6–20)
BUN/CREAT SERPL: 19.2 (ref 7–25)
CALCIUM SPEC-SCNC: 9 MG/DL (ref 8.6–10.5)
CHLORIDE SERPL-SCNC: 99 MMOL/L (ref 98–107)
CO2 SERPL-SCNC: 26.3 MMOL/L (ref 22–29)
CREAT SERPL-MCNC: 0.73 MG/DL (ref 0.76–1.27)
DEPRECATED RDW RBC AUTO: 44 FL (ref 37–54)
ERYTHROCYTE [DISTWIDTH] IN BLOOD BY AUTOMATED COUNT: 13.3 % (ref 12.3–15.4)
GFR SERPL CREATININE-BSD FRML MDRD: 115 ML/MIN/1.73
GLUCOSE SERPL-MCNC: 118 MG/DL (ref 65–99)
GRAM STN SPEC: ABNORMAL
HCT VFR BLD AUTO: 37.3 % (ref 37.5–51)
HGB BLD-MCNC: 12.1 G/DL (ref 13–17.7)
ISOLATED FROM: ABNORMAL
MCH RBC QN AUTO: 29.1 PG (ref 26.6–33)
MCHC RBC AUTO-ENTMCNC: 32.4 G/DL (ref 31.5–35.7)
MCV RBC AUTO: 89.7 FL (ref 79–97)
PLATELET # BLD AUTO: 503 10*3/MM3 (ref 140–450)
PMV BLD AUTO: 8.8 FL (ref 6–12)
POTASSIUM SERPL-SCNC: 4.1 MMOL/L (ref 3.5–5.2)
RBC # BLD AUTO: 4.16 10*6/MM3 (ref 4.14–5.8)
SODIUM SERPL-SCNC: 135 MMOL/L (ref 136–145)
WBC # BLD AUTO: 8.95 10*3/MM3 (ref 3.4–10.8)

## 2021-05-09 PROCEDURE — 99232 SBSQ HOSP IP/OBS MODERATE 35: CPT | Performed by: INTERNAL MEDICINE

## 2021-05-09 PROCEDURE — 25010000002 CEFTRIAXONE SODIUM-DEXTROSE 2-2.22 GM-%(50ML) RECONSTITUTED SOLUTION: Performed by: EMERGENCY MEDICINE

## 2021-05-09 PROCEDURE — 85027 COMPLETE CBC AUTOMATED: CPT | Performed by: INTERNAL MEDICINE

## 2021-05-09 PROCEDURE — 25010000002 AZITHROMYCIN 500 MG/250 ML: Performed by: EMERGENCY MEDICINE

## 2021-05-09 PROCEDURE — 80048 BASIC METABOLIC PNL TOTAL CA: CPT | Performed by: INTERNAL MEDICINE

## 2021-05-09 RX ORDER — AMLODIPINE BESYLATE 5 MG/1
5 TABLET ORAL
Status: DISCONTINUED | OUTPATIENT
Start: 2021-05-09 | End: 2021-05-10 | Stop reason: HOSPADM

## 2021-05-09 RX ADMIN — SODIUM CHLORIDE, PRESERVATIVE FREE 10 ML: 5 INJECTION INTRAVENOUS at 08:30

## 2021-05-09 RX ADMIN — Medication 30 ML: at 20:07

## 2021-05-09 RX ADMIN — AMLODIPINE BESYLATE 5 MG: 5 TABLET ORAL at 12:28

## 2021-05-09 RX ADMIN — MUPIROCIN: 20 OINTMENT TOPICAL at 20:07

## 2021-05-09 RX ADMIN — MUPIROCIN: 20 OINTMENT TOPICAL at 08:46

## 2021-05-09 RX ADMIN — SODIUM CHLORIDE, PRESERVATIVE FREE 10 ML: 5 INJECTION INTRAVENOUS at 20:07

## 2021-05-09 RX ADMIN — Medication 1 CAPSULE: at 08:46

## 2021-05-09 RX ADMIN — AZITHROMYCIN 500 MG: 500 INJECTION, POWDER, LYOPHILIZED, FOR SOLUTION INTRAVENOUS at 06:03

## 2021-05-09 RX ADMIN — MULTIPLE VITAMINS W/ MINERALS TAB 1 TABLET: TAB at 08:46

## 2021-05-09 RX ADMIN — QUETIAPINE FUMARATE 200 MG: 100 TABLET ORAL at 20:07

## 2021-05-09 RX ADMIN — CEFTRIAXONE 2 G: 2 INJECTION, SOLUTION INTRAVENOUS at 05:05

## 2021-05-09 RX ADMIN — Medication 1 CAPSULE: at 20:07

## 2021-05-09 NOTE — PLAN OF CARE
Goal Outcome Evaluation:  Plan of Care Reviewed With: patient  Progress: improving   IV antibiotics continue. Stefano denies pain and SOA. IV abx continue. He is expected to discharge to shelter  tomorrow.

## 2021-05-09 NOTE — PLAN OF CARE
Goal Outcome Evaluation:        Outcome Summary: No acute events noted overnight.  Continued IV antibiotics for PNA.  CM working on housing situation when stable for DC.

## 2021-05-09 NOTE — PROGRESS NOTES
[facility]    Name:  Stefano Claire   Age:  50 y.o.  Sex:  male  :  1970  MRN:  [id number]   Visit Number:  [id number]  Admission Date:  2021  Date Of Service:  21  Primary Care Physician:  Provider, No Known     LOS: 3 days :    Chief Complaint:      Generalized weakness.    Subjective:    Mr. Claire was seen and examined this morning.  He is currently lying down on the bed and is comfortable at rest.  Denies any chest pain, nausea or vomiting.  His blood pressures have been consistently noted to be elevated in the 150s.  No fevers since admission.  No significant overnight events.    Hospital Course:    Mr. Claire is a 50-year-old male with history of depression, IV drug abuse was admitted from the emergency room with symptoms of chest congestion, pleuritic chest pain and cough.  Blood work done in the emergency room revealed a white count of 44,000 with elevated procalcitonin and C-reactive protein.  His sodium was 122 but renal function was within normal limits and the troponin was negative.  CT PE protocol done in the emergency room was negative for pulmonary embolism but showed consolidation within the inferior aspect of the right upper lobe as well as right middle lobe and left lung base.  Patient was given IV fluids and was placed on IV antibiotics therapy with Rocephin and azithromycin.  Blood cultures were drawn in the emergency room.  Streptococcus and Legionella serology was negative.  2D echocardiogram was done on 2021 and it showed normal left ventricular systolic and diastolic functions.  Repeat blood cultures were done on 2021.  His nasal swab for MRSA was positive and he was placed on Bactroban ointment.    Patient was noted to have persistently elevated blood pressures in the 150s.  He was started on amlodipine for essential hypertension.    Review of Systems:     All systems were reviewed and negative except as  mentioned in subjective, assessment and plan.    Vital Signs:    Temp:  [97.5 °F (36.4 °C)-98.5 °F (36.9 °C)] 97.5 °F (36.4 °C)  Heart Rate:  [66-79] 71  Resp:  [18-20] 18  BP: (140-152)/(94-99) 140/94    Intake and output:    I/O last 3 completed shifts:  In: 1080 [P.O.:480; IV Piggyback:600]  Out: 3275 [Urine:3275]  I/O this shift:  In: 480 [P.O.:480]  Out: 750 [Urine:750]    Physical Examination:    General Appearance:  Alert and cooperative.  Comfortable at rest.   Head:  Atraumatic and normocephalic.   Eyes: Conjunctivae and sclerae normal, no icterus. No pallor.   Throat: No oral lesions, no thrush, oral mucosa moist.   Neck: Supple, trachea midline, no thyromegaly.   Lungs:   Breath sounds heard bilaterally equally.  No crackles or wheezing. No Pleural rub or bronchial breathing.   Heart:  Normal S1 and S2, no murmur, no gallop, no rub. No JVD.   Abdomen:   Normal bowel sounds, no masses, no organomegaly. Soft, nontender, nondistended, no rebound tenderness.   Extremities: Supple, no edema, no cyanosis, no clubbing.   Skin: No bleeding or rash.   Neurologic: Alert and oriented x 3. No facial asymmetry. Moves all four limbs. No tremors.      Laboratory results:    Results from last 7 days   Lab Units 05/09/21  0635 05/07/21  0429 05/06/21  0149   SODIUM mmol/L 135* 132* 122*   POTASSIUM mmol/L 4.1 3.8 4.3   CHLORIDE mmol/L 99 96* 84*   CO2 mmol/L 26.3 28.3 26.8   BUN mg/dL 14 16 12   CREATININE mg/dL 0.73* 0.83 0.99   CALCIUM mg/dL 9.0 9.0 8.8   BILIRUBIN mg/dL  --   --  0.8   ALK PHOS U/L  --   --  126*   ALT (SGPT) U/L  --   --  9   AST (SGOT) U/L  --   --  15   GLUCOSE mg/dL 118* 94 114*     Results from last 7 days   Lab Units 05/09/21  0635 05/07/21  0429 05/06/21  0149   WBC 10*3/mm3 8.95 21.44* 43.59*   HEMOGLOBIN g/dL 12.1* 11.2* 12.9*   HEMATOCRIT % 37.3* 33.2* 38.2   PLATELETS 10*3/mm3 503* 406 452*         Results from last 7 days   Lab Units 05/06/21 0149   TROPONIN T ng/mL <0.010     I have  reviewed the patient's laboratory results.    Radiology results:    No radiology results from the last 24 hrs  I have reviewed the patient's radiology reports.    Medication Review:     I have reviewed the patient's active and prn medications.     Problem List:      Hyponatremia    Methamphetamine use disorder, severe (CMS/HCC)    Community acquired pneumonia of right lung    IV drug user    Thrombocytosis (CMS/HCC)    Tobacco dependence due to cigarettes    Moderate malnutrition (CMS/HCC)    Assessment:    1.  Bilateral multifocal bacterial pneumonia, unable to classify further, POA.  2.  Hyponatremia, present on admission, improving.  3.  Essential hypertension, started on amlodipine.  4.  IV drug abuse.  5.  Tobacco dependence.  6.  Bipolar disorder.    Plan:    Mr. Claire is currently hemodynamically stable except for elevated blood pressures.  I will start him on amlodipine today.  He is afebrile and will be continued on Rocephin and azithromycin.  His sodium has significantly improved and is currently 135.  Surveillance blood cultures done on 5/7/2021 have been negative so far.  Nasal swab was positive for MRSA and he is on Bactroban ointment.  Continue lactobacillus supplements.    His home medications will be continued including Seroquel.  He is on Lovenox for DVT prophylaxis.  Patient states that he is homeless and  are trying to arrange him into a shelter.  He is independent in daily activities and does not need PT. should be able to go to a shelter tomorrow with oral antibiotics therapy.    DVT Prophylaxis: Lovenox.  Code Status: Full code.  Diet: Regular.  Discharge Plan: Discharge tomorrow to homeless shelter with antibiotics (meds to beds).    Gordon Jules MD  05/09/21  16:12 EDT    Dictated utilizing Dragon dictation.

## 2021-05-10 ENCOUNTER — TELEPHONE (OUTPATIENT)
Dept: INTERNAL MEDICINE | Facility: CLINIC | Age: 51
End: 2021-05-10

## 2021-05-10 ENCOUNTER — READMISSION MANAGEMENT (OUTPATIENT)
Dept: CALL CENTER | Facility: HOSPITAL | Age: 51
End: 2021-05-10

## 2021-05-10 VITALS
SYSTOLIC BLOOD PRESSURE: 146 MMHG | DIASTOLIC BLOOD PRESSURE: 59 MMHG | HEIGHT: 66 IN | HEART RATE: 81 BPM | RESPIRATION RATE: 18 BRPM | OXYGEN SATURATION: 98 % | WEIGHT: 112.88 LBS | TEMPERATURE: 97.6 F | BODY MASS INDEX: 18.14 KG/M2

## 2021-05-10 PROCEDURE — 25010000002 ENOXAPARIN PER 10 MG: Performed by: EMERGENCY MEDICINE

## 2021-05-10 PROCEDURE — 99239 HOSP IP/OBS DSCHRG MGMT >30: CPT | Performed by: FAMILY MEDICINE

## 2021-05-10 PROCEDURE — 25010000002 CEFTRIAXONE SODIUM-DEXTROSE 2-2.22 GM-%(50ML) RECONSTITUTED SOLUTION: Performed by: EMERGENCY MEDICINE

## 2021-05-10 RX ORDER — CEFDINIR 300 MG/1
300 CAPSULE ORAL 2 TIMES DAILY
Qty: 10 CAPSULE | Refills: 0 | Status: SHIPPED | OUTPATIENT
Start: 2021-05-10 | End: 2021-05-10 | Stop reason: SDUPTHER

## 2021-05-10 RX ORDER — AMLODIPINE BESYLATE 5 MG/1
5 TABLET ORAL
Qty: 30 TABLET | Refills: 0 | Status: SHIPPED | OUTPATIENT
Start: 2021-05-11 | End: 2021-06-10

## 2021-05-10 RX ORDER — QUETIAPINE FUMARATE 200 MG/1
200 TABLET, FILM COATED ORAL NIGHTLY
Qty: 30 TABLET | Refills: 0 | Status: SHIPPED | OUTPATIENT
Start: 2021-05-10 | End: 2022-02-03

## 2021-05-10 RX ORDER — CEFDINIR 300 MG/1
300 CAPSULE ORAL 2 TIMES DAILY
Qty: 20 CAPSULE | Refills: 0 | Status: SHIPPED | OUTPATIENT
Start: 2021-05-10 | End: 2021-05-20

## 2021-05-10 RX ADMIN — MUPIROCIN: 20 OINTMENT TOPICAL at 09:36

## 2021-05-10 RX ADMIN — Medication 1 CAPSULE: at 09:36

## 2021-05-10 RX ADMIN — AMLODIPINE BESYLATE 5 MG: 5 TABLET ORAL at 09:36

## 2021-05-10 RX ADMIN — SODIUM CHLORIDE, PRESERVATIVE FREE 10 ML: 5 INJECTION INTRAVENOUS at 09:36

## 2021-05-10 RX ADMIN — MULTIPLE VITAMINS W/ MINERALS TAB 1 TABLET: TAB at 09:36

## 2021-05-10 RX ADMIN — CEFTRIAXONE 2 G: 2 INJECTION, SOLUTION INTRAVENOUS at 06:27

## 2021-05-10 NOTE — TELEPHONE ENCOUNTER
Caller: [facility]      Best call back number: [phone]    New or established patient?  [x] New  [] Established    Date of discharge: 05/10/2021  Facility discharged from: [facility]    Diagnosis/Symptoms: PNEUMONIA    Length of stay (If applicable):4 DAYS    Specialty Only: Did you see a [facility] provider?    [x] Yes  [] No

## 2021-05-10 NOTE — CASE MANAGEMENT/SOCIAL WORK
Continued Stay Note  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 mora        Discharge Codes    No documentation.       Expected Discharge Date and Time     Expected Discharge Date Expected Discharge Time    May 11, 2021             Esther Dunlap RN

## 2021-05-10 NOTE — OUTREACH NOTE
Prep Survey      Responses   [facility] facility patient discharged from?  [place]   Is LACE score < 7 ?  Yes   Emergency Room discharge w/ pulse ox?  No   Eligibility  [facility]   Date of Admission  05/06/21   Date of Discharge  05/10/21   Discharge diagnosis  Hyponatremia, Pneumonia, IV drug abuse   Does the patient have one of the following disease processes/diagnoses(primary or secondary)?  COPD/Pneumonia   Comments regarding appointments  Please see AVS   Medication alerts for this patient  Omnicef x 10 more days given to pt.   General alerts for this patient  refuses to go to shelter, prefers to [facility]   Prep survey completed?  Yes          Yulia Romero RN

## 2021-05-10 NOTE — PLAN OF CARE
Goal Outcome Evaluation:   Discharged home with no reports of pain and no acute distress this shift.  Received medications from pharmacy and left on foot.  No difficulty with ambulation.

## 2021-05-10 NOTE — DISCHARGE SUMMARY
[facility]   DISCHARGE SUMMARY      Name:  Stefano Claire   Age:  50 y.o.  Sex:  male  :  1970  MRN:  [id number]   Visit Number:  [id number]    Admission Date:  2021  Date of Discharge:  5/10/2021  Primary Care Physician:  Provider, No Known    Important issues to note:    Patient will be discharged home under his own care.  Will need to stay on antibiotics as directed.  Attempted to have patient go to shelter, he refused.    Discharge Diagnoses:     1.  Bilateral multifocal bacterial pneumonia, unable to classify further, POA.  2.  Hyponatremia, present on admission, improving.  3.  Essential hypertension, started on amlodipine.  4.  IV drug abuse.  5.  Tobacco dependence.  6.  Bipolar disorder.    Problem List:     Active Hospital Problems    Diagnosis  POA   • **Hyponatremia [E87.1]  Yes   • Moderate malnutrition (CMS/HCC) [E44.0]  Yes   • Community acquired pneumonia of right lung [J18.9]  Yes   • IV drug user [F19.90]  Yes   • Thrombocytosis (CMS/HCC) [D47.3]  Yes   • Tobacco dependence due to cigarettes [F17.210]  Yes   • Methamphetamine use disorder, severe (CMS/HCC) [F15.20]  Yes      Resolved Hospital Problems   No resolved problems to display.     Presenting Problem:    Chief Complaint   Patient presents with   • Chest Pain      Consults:     Consulting Physician(s)             None          Procedures Performed:        History of presenting illness/Hospital Course:    50-year-old gentleman with history of IV drug abuse, depression, tobacco abuse, who had presented to the emergency room with complaints of chest congestion, cough, pleuritic chest pain.  Evidence of a white count of 44,000 with elevated procalcitonin and CRP.  Sodium of 122.  CT PE protocol demonstrated consolidation within the inferior aspect of the right upper lobe as well as right middle lobe and left lung base.  He was started on IV fluids, antibiotics with Rocephin and azithromycin.  2D  echocardiogram performed was negative for any vegetation and had normal left ventricular systolic and diastolic functions.  He also had elevated blood pressures and was started on amlodipine.  Blood cultures subsequently positive for Streptococcus pneumonia.  White count is trended down to normal range, oxygenation is improved, and patient is much improved.    We had attempted to have patient moved to a shelter upon discharge, however he has refused this.  He is wanting to go home at this time.  Will discharge with 10 more days of Omnicef for antibiotic coverage.  Do not feel patient needs IV antibiotics any further as likely source of bacteremia is from pneumonia and much less likely endocarditis.  Recommended patient establish care with PCP.    Patient is high risk due to homelessness, IV drug use, and medical noncompliance.    Vital Signs:    Temp:  [97.5 °F (36.4 °C)-98.3 °F (36.8 °C)] 98 °F (36.7 °C)  Heart Rate:  [68-81] 69  Resp:  [16-20] 16  BP: (140-154)/() 141/68    Physical Exam:    General Appearance:  Alert and cooperative.  Appears fairly well   Head:  Atraumatic and normocephalic.   Eyes: Conjunctivae and sclerae normal, no icterus. No pallor.   Ears:  Ears with no abnormalities noted.   Throat: No oral lesions, no thrush, oral mucosa moist.   Neck: Supple, trachea midline, no thyromegaly.   Back:   No kyphoscoliosis present. No tenderness to palpation.   Lungs:   Breath sounds heard bilaterally equally.  No crackles or wheezing. No Pleural rub or bronchial breathing.   Heart:  Normal S1 and S2, no murmur, no gallop, no rub. No JVD.   Abdomen:   Normal bowel sounds, no masses, no organomegaly. Soft, nontender, nondistended, no rebound tenderness.   Extremities: Supple, no edema, no cyanosis, no clubbing.   Pulses: Pulses palpable bilaterally.   Skin: No bleeding or rash.   Neurologic: Alert and oriented x 3. No facial asymmetry. Moves all four limbs. No tremors.     Pertinent Lab Results:      Results from last 7 days   Lab Units 05/09/21  0635 05/07/21  0429 05/06/21  0149   SODIUM mmol/L 135* 132* 122*   POTASSIUM mmol/L 4.1 3.8 4.3   CHLORIDE mmol/L 99 96* 84*   CO2 mmol/L 26.3 28.3 26.8   BUN mg/dL 14 16 12   CREATININE mg/dL 0.73* 0.83 0.99   CALCIUM mg/dL 9.0 9.0 8.8   BILIRUBIN mg/dL  --   --  0.8   ALK PHOS U/L  --   --  126*   ALT (SGPT) U/L  --   --  9   AST (SGOT) U/L  --   --  15   GLUCOSE mg/dL 118* 94 114*     Results from last 7 days   Lab Units 05/09/21  0635 05/07/21  0429 05/06/21  0149   WBC 10*3/mm3 8.95 21.44* 43.59*   HEMOGLOBIN g/dL 12.1* 11.2* 12.9*   HEMATOCRIT % 37.3* 33.2* 38.2   PLATELETS 10*3/mm3 503* 406 452*         Results from last 7 days   Lab Units 05/06/21  0149   TROPONIN T ng/mL <0.010                     Results from last 7 days   Lab Units 05/07/21  0434 05/07/21  0429 05/06/21  0422 05/06/21  0409   BLOODCX  No growth at 3 days No growth at 3 days No growth at 4 days Streptococcus pneumoniae*       Pertinent Radiology Results:    Imaging Results (All)     Procedure Component Value Units Date/Time    XR Chest 1 View [[id number]] Collected: 05/06/21 1035     Updated: 05/06/21 1504    Narrative:      PROCEDURE: XR CHEST 1 VW-        HISTORY: Chest Pain Triage Protocol     COMPARISON: February 2020.     FINDINGS: The heart is normal in size. The mediastinum is unremarkable.  Right mid and lower lung field airspace disease is consistent with  pneumonia. There is no pneumothorax. There are no acute osseous  abnormalities.       Impression:      Right mid and lower lung field airspace disease is  consistent with pneumonia.           Images were reviewed, interpreted, and dictated by Dr. Melissa Rosas M.D.  Transcribed by Martha Quintana PA-C.     This report was finalized on 5/6/2021 3:01 PM by Melissa Rosas M.D..    CT Chest Pulmonary Embolism [[id number]] Collected: 05/06/21 0520     Updated: 05/06/21 0522    Narrative:      FINAL  REPORT    TECHNIQUE:  null    CLINICAL HISTORY:  right sided pneumonia, leukocytosis, hx. drug abuse.    COMPARISON:  null    FINDINGS:  CT angiography chest with contrast. 3D Postprocessing.    Comparison: None    Findings:    The thyroid is normal.    Unremarkable thoracic aorta and great vessels.    The heart is normal size. RV/LV ratio is normal.    The mediastinum are unremarkable.    No pulmonary embolus.    Calcified granulomas within the right hilar region.  Trace right basilar pleural effusion.  Consolidation with air bronchograms within the inferior aspect of the right upper lobe and additional consolidation within the right middle lobe.  Minimal   consolidation left lung base.  Findings are concerning for multifocal infectious process including pneumonia.  Recommend continue follow-up to resolution.    The upper abdomen is unremarkable.    Splenic granulomas.    No acute fractures.      Impression:      IMPRESSION:    1. No pulmonary embolus.    2. Trace right basilar pleural effusion.  Consolidation with air bronchograms within the inferior aspect of the right upper lobe and additional consolidation within the right middle lobe.  Minimal consolidation of the left lung base.  Findings are   concerning for multifocal infectious process including pneumonia.  Recommend continue follow-up to resolution.    Authenticated by Gera Ramos DO on 05/06/2021 05:20:44 AM          Echo:    Results for orders placed during the hospital encounter of 05/06/21    Adult Transthoracic Echo Complete W/ Cont if Necessary Per Protocol    Interpretation Summary  1.  Normal left ventricular size and systolic function, LVEF 60-65%.  2.  Mild concentric LVH.  3.  Normal LV diastolic filling pattern.  4.  Normal right ventricular size and systolic function.  5.  Mild mitral regurgitation.  6.  Trace TR.    Condition on Discharge:      Stable.    Code status during the hospital stay:    Code Status and Medical Interventions:    Ordered at: 05/06/21 0420     Code Status:    CPR     Medical Interventions (Level of Support Prior to Arrest):    Full     Discharge Disposition:    Home or Self Care    Discharge Medications:       Discharge Medications      New Medications      Instructions Start Date   amLODIPine 5 MG tablet  Commonly known as: NORVASC   5 mg, Oral, Every 24 Hours Scheduled   Start Date: May 11, 2021     cefdinir 300 MG capsule  Commonly known as: OMNICEF   300 mg, Oral, 2 Times Daily         Continue These Medications      Instructions Start Date   QUEtiapine 200 MG tablet  Commonly known as: SEROquel   200 mg, Oral, Nightly         Stop These Medications    ARIPiprazole 10 MG tablet  Commonly known as: ABILIFY          Discharge Diet:   As tolerated    Activity at Discharge:   As tolerated    Follow-up Appointments:    Follow-up Information     Provider, No Known Follow up in 1 month(s).    Contact information:  [facility] [id number] [phone]                 No future appointments.  Test Results Pending at Discharge:    Pending Labs     Order Current Status    Blood Culture - Blood, Arm, Left Preliminary result    Blood Culture - Blood, Arm, Left Preliminary result    Blood Culture - Blood, Hand, Left Preliminary result             Lanny Tilley DO  05/10/21  10:51 EDT    Time: I spent 40 minutes on this discharge activity which included: face-to-face encounter with the patient, reviewing the data in the system, coordination of the care with the nursing staff as well as consultants, documentation, and entering orders.     Dictated utilizing Dragon dictation.

## 2021-05-10 NOTE — PLAN OF CARE
Goal Outcome Evaluation:  Plan of Care Reviewed With: patient  Progress: improving  Outcome Summary: No acute events during the night. Patient rested well. Vital Signs Stable. Planning discharge today.

## 2021-05-11 ENCOUNTER — TRANSITIONAL CARE MANAGEMENT TELEPHONE ENCOUNTER (OUTPATIENT)
Dept: CALL CENTER | Facility: HOSPITAL | Age: 51
End: 2021-05-11

## 2021-05-11 LAB — BACTERIA SPEC AEROBE CULT: NORMAL

## 2021-05-11 NOTE — OUTREACH NOTE
Call Center TCM Note      Responses   [facility] patient discharged from?  [place]   Does the patient have one of the following disease processes/diagnoses(primary or secondary)?  COPD/Pneumonia   TCM attempt successful?  No [Verbal consent . Nobody listed on previous verbal consent]   Unsuccessful attempts  Attempt 1          Radha Perez RN    2021, 14:35 EDT

## 2021-05-11 NOTE — CASE MANAGEMENT/SOCIAL WORK
Case Management Discharge Note    Per nurse note, left on foot, no issues with ambulation       Provided Post Acute Provider List?: N/A  N/A Provider List Comment: still in icu    Selected Continued Care - Discharged on 5/10/2021 Admission date: 5/6/2021 - Discharge disposition: Home or Self Care                 Final Discharge Disposition Code: 01 - home or self-care

## 2021-05-11 NOTE — OUTREACH NOTE
Call Center TCM Note      Responses   [facility] patient discharged from?  [place]   Does the patient have one of the following disease processes/diagnoses(primary or secondary)?  COPD/Pneumonia   Was the primary reason for admission:  Pneumonia   TCM attempt successful?  No   Unsuccessful attempts  Attempt 2          Radha Perez RN    5/11/2021, 15:18 EDT

## 2021-05-12 ENCOUNTER — TRANSITIONAL CARE MANAGEMENT TELEPHONE ENCOUNTER (OUTPATIENT)
Dept: CALL CENTER | Facility: HOSPITAL | Age: 51
End: 2021-05-12

## 2021-05-12 LAB
BACTERIA SPEC AEROBE CULT: NORMAL
BACTERIA SPEC AEROBE CULT: NORMAL

## 2021-05-12 NOTE — OUTREACH NOTE
Call Center TCM Note      Responses   [facility] patient discharged from?  [place]   Does the patient have one of the following disease processes/diagnoses(primary or secondary)?  COPD/Pneumonia   Was the primary reason for admission:  Pneumonia   TCM attempt successful?  No   Unsuccessful attempts  Attempt 3 [patient's number is wrong number, father did not answer]          Enedelia Keita RN    5/12/2021, 09:52 EDT

## 2021-06-06 ENCOUNTER — HOSPITAL ENCOUNTER (OUTPATIENT)
Facility: HOSPITAL | Age: 51
Setting detail: OBSERVATION
Discharge: LEFT BEFORE DISCHARGE | End: 2021-06-07
Attending: STUDENT IN AN ORGANIZED HEALTH CARE EDUCATION/TRAINING PROGRAM | Admitting: INTERNAL MEDICINE

## 2021-06-06 DIAGNOSIS — R33.9 URINARY RETENTION: Primary | ICD-10-CM

## 2021-06-06 PROBLEM — I10 ESSENTIAL HYPERTENSION: Status: ACTIVE | Noted: 2021-06-06

## 2021-06-06 LAB
AMPHET+METHAMPHET UR QL: NEGATIVE
AMPHETAMINES UR QL: POSITIVE
BARBITURATES UR QL SCN: NEGATIVE
BENZODIAZ UR QL SCN: NEGATIVE
BILIRUB UR QL STRIP: NEGATIVE
BUPRENORPHINE SERPL-MCNC: NEGATIVE NG/ML
CANNABINOIDS SERPL QL: NEGATIVE
CLARITY UR: CLEAR
COCAINE UR QL: NEGATIVE
COLOR UR: YELLOW
GLUCOSE UR STRIP-MCNC: NEGATIVE MG/DL
HGB UR QL STRIP.AUTO: NEGATIVE
KETONES UR QL STRIP: NEGATIVE
LEUKOCYTE ESTERASE UR QL STRIP.AUTO: NEGATIVE
METHADONE UR QL SCN: NEGATIVE
NITRITE UR QL STRIP: NEGATIVE
OPIATES UR QL: NEGATIVE
OXYCODONE UR QL SCN: NEGATIVE
PCP UR QL SCN: NEGATIVE
PH UR STRIP.AUTO: 7 [PH] (ref 5–8)
PROPOXYPH UR QL: NEGATIVE
PROT UR QL STRIP: NEGATIVE
SARS-COV-2 RNA PNL SPEC NAA+PROBE: NOT DETECTED
SP GR UR STRIP: 1.01 (ref 1–1.03)
TRICYCLICS UR QL SCN: NEGATIVE
UROBILINOGEN UR QL STRIP: NORMAL

## 2021-06-06 PROCEDURE — 87591 N.GONORRHOEAE DNA AMP PROB: CPT | Performed by: NURSE PRACTITIONER

## 2021-06-06 PROCEDURE — G0378 HOSPITAL OBSERVATION PER HR: HCPCS

## 2021-06-06 PROCEDURE — 80306 DRUG TEST PRSMV INSTRMNT: CPT | Performed by: NURSE PRACTITIONER

## 2021-06-06 PROCEDURE — 99219 PR INITIAL OBSERVATION CARE/DAY 50 MINUTES: CPT | Performed by: INTERNAL MEDICINE

## 2021-06-06 PROCEDURE — 99284 EMERGENCY DEPT VISIT MOD MDM: CPT

## 2021-06-06 PROCEDURE — 25010000002 HALOPERIDOL LACTATE PER 5 MG: Performed by: EMERGENCY MEDICINE

## 2021-06-06 PROCEDURE — 87635 SARS-COV-2 COVID-19 AMP PRB: CPT | Performed by: INTERNAL MEDICINE

## 2021-06-06 PROCEDURE — 96372 THER/PROPH/DIAG INJ SC/IM: CPT

## 2021-06-06 PROCEDURE — 25010000002 CEFTRIAXONE PER 250 MG: Performed by: NURSE PRACTITIONER

## 2021-06-06 PROCEDURE — 87491 CHLMYD TRACH DNA AMP PROBE: CPT | Performed by: NURSE PRACTITIONER

## 2021-06-06 PROCEDURE — 81003 URINALYSIS AUTO W/O SCOPE: CPT | Performed by: NURSE PRACTITIONER

## 2021-06-06 PROCEDURE — C9803 HOPD COVID-19 SPEC COLLECT: HCPCS

## 2021-06-06 RX ORDER — SODIUM CHLORIDE 0.9 % (FLUSH) 0.9 %
3 SYRINGE (ML) INJECTION EVERY 12 HOURS SCHEDULED
Status: DISCONTINUED | OUTPATIENT
Start: 2021-06-06 | End: 2021-06-08 | Stop reason: HOSPADM

## 2021-06-06 RX ORDER — ACETAMINOPHEN 160 MG/5ML
650 SOLUTION ORAL EVERY 4 HOURS PRN
Status: DISCONTINUED | OUTPATIENT
Start: 2021-06-06 | End: 2021-06-08 | Stop reason: HOSPADM

## 2021-06-06 RX ORDER — DOXYCYCLINE 100 MG/1
100 CAPSULE ORAL ONCE
Status: COMPLETED | OUTPATIENT
Start: 2021-06-06 | End: 2021-06-06

## 2021-06-06 RX ORDER — ACETAMINOPHEN 650 MG/1
650 SUPPOSITORY RECTAL EVERY 4 HOURS PRN
Status: DISCONTINUED | OUTPATIENT
Start: 2021-06-06 | End: 2021-06-08 | Stop reason: HOSPADM

## 2021-06-06 RX ORDER — CARVEDILOL 6.25 MG/1
6.25 TABLET ORAL 2 TIMES DAILY WITH MEALS
Status: DISCONTINUED | OUTPATIENT
Start: 2021-06-06 | End: 2021-06-08 | Stop reason: HOSPADM

## 2021-06-06 RX ORDER — SODIUM CHLORIDE 0.9 % (FLUSH) 0.9 %
10 SYRINGE (ML) INJECTION AS NEEDED
Status: DISCONTINUED | OUTPATIENT
Start: 2021-06-06 | End: 2021-06-08 | Stop reason: HOSPADM

## 2021-06-06 RX ORDER — QUETIAPINE FUMARATE 100 MG/1
200 TABLET, FILM COATED ORAL NIGHTLY
Status: DISCONTINUED | OUTPATIENT
Start: 2021-06-06 | End: 2021-06-08 | Stop reason: HOSPADM

## 2021-06-06 RX ORDER — ONDANSETRON 2 MG/ML
4 INJECTION INTRAMUSCULAR; INTRAVENOUS EVERY 6 HOURS PRN
Status: DISCONTINUED | OUTPATIENT
Start: 2021-06-06 | End: 2021-06-08 | Stop reason: HOSPADM

## 2021-06-06 RX ORDER — HALOPERIDOL 5 MG/ML
2 INJECTION INTRAMUSCULAR EVERY 6 HOURS PRN
Status: DISCONTINUED | OUTPATIENT
Start: 2021-06-06 | End: 2021-06-07

## 2021-06-06 RX ORDER — ACETAMINOPHEN 325 MG/1
650 TABLET ORAL EVERY 4 HOURS PRN
Status: DISCONTINUED | OUTPATIENT
Start: 2021-06-06 | End: 2021-06-08 | Stop reason: HOSPADM

## 2021-06-06 RX ORDER — TAMSULOSIN HYDROCHLORIDE 0.4 MG/1
0.4 CAPSULE ORAL ONCE
Status: COMPLETED | OUTPATIENT
Start: 2021-06-06 | End: 2021-06-06

## 2021-06-06 RX ORDER — HYDRALAZINE HYDROCHLORIDE 20 MG/ML
10 INJECTION INTRAMUSCULAR; INTRAVENOUS EVERY 4 HOURS PRN
Status: DISCONTINUED | OUTPATIENT
Start: 2021-06-06 | End: 2021-06-08 | Stop reason: HOSPADM

## 2021-06-06 RX ORDER — TAMSULOSIN HYDROCHLORIDE 0.4 MG/1
0.4 CAPSULE ORAL NIGHTLY
Status: DISCONTINUED | OUTPATIENT
Start: 2021-06-07 | End: 2021-06-08 | Stop reason: HOSPADM

## 2021-06-06 RX ORDER — SODIUM CHLORIDE 0.9 % (FLUSH) 0.9 %
3-10 SYRINGE (ML) INJECTION AS NEEDED
Status: DISCONTINUED | OUTPATIENT
Start: 2021-06-06 | End: 2021-06-08 | Stop reason: HOSPADM

## 2021-06-06 RX ORDER — AMLODIPINE BESYLATE 5 MG/1
5 TABLET ORAL
Status: DISCONTINUED | OUTPATIENT
Start: 2021-06-06 | End: 2021-06-08 | Stop reason: HOSPADM

## 2021-06-06 RX ADMIN — HALOPERIDOL LACTATE 2 MG: 5 INJECTION, SOLUTION INTRAMUSCULAR at 23:03

## 2021-06-06 RX ADMIN — AMLODIPINE BESYLATE 5 MG: 5 TABLET ORAL at 13:37

## 2021-06-06 RX ADMIN — LIDOCAINE HYDROCHLORIDE 490 MG: 10 INJECTION, SOLUTION EPIDURAL; INFILTRATION; INTRACAUDAL; PERINEURAL at 12:24

## 2021-06-06 RX ADMIN — DOXYCYCLINE 100 MG: 100 CAPSULE ORAL at 12:24

## 2021-06-06 RX ADMIN — CARVEDILOL 6.25 MG: 6.25 TABLET, FILM COATED ORAL at 17:36

## 2021-06-06 RX ADMIN — SODIUM CHLORIDE, PRESERVATIVE FREE 3 ML: 5 INJECTION INTRAVENOUS at 20:13

## 2021-06-06 RX ADMIN — TAMSULOSIN HYDROCHLORIDE 0.4 MG: 0.4 CAPSULE ORAL at 14:30

## 2021-06-06 RX ADMIN — QUETIAPINE FUMARATE 200 MG: 100 TABLET ORAL at 20:13

## 2021-06-06 NOTE — PLAN OF CARE
Goal Outcome Evaluation:  Plan of Care Reviewed With: patient  Progress: no change  Outcome Summary: New admit from ED.  Patient resting comfortably in bed.  Denies pain/discomfort at this time.  Hypertension improved with PO medication.  Will continue to monitor patient.

## 2021-06-06 NOTE — ED NOTES
On call  called-- no answer-- voicemail full, unable to leave message. Will attempt to callback.      Andria Rosen RN  06/06/21 2384

## 2021-06-06 NOTE — H&P
[facility]   HISTORY AND PHYSICAL      Name:  Stefano Claire   Age:  50 y.o.  Sex:  male  :  1970  MRN:  [id number]   Visit Number:  [id number]  Admission Date:  2021  Date Of Service:  21  Primary Care Physician:  Zaira Dubois APRN    Chief Complaint:     Difficulty urination.    History Of Presenting Illness:      Mr. Claire is a 50-year-old male with history of untreated hypertension, history of testicular cancer in 2009 status post orchiectomy walked to the emergency room with inability to urinate for the last 2 days or so.  Patient states that she has had prior problems with urinary retention.  He is not a good historian but states that he used to use in and out catheters in the past for up to 2 years but exact details for the indication is uncertain.  Patient states that he does not have a primary care physician and has not been taking any of his medications.  He states that after his mother's death a year ago, he has been homeless.  He however tells me that he recently got a job at the [employer] and plans to get an apartment for rent.  He states that he has 2 brothers who live in [place] but they are unable to take care of him.  Patient states that he has been using diapers as he leaks urine.    Patient was evaluated in the emergency room and was afebrile but tachycardic at 138 and had elevated blood pressure of 184/139 on admission.  Pulse oxygen saturation was 98% on room air.  Due to urinary retention, a Charles catheter was placed in the emergency room and 600 mL of urine out but was noted as per the ED provider.  Patient was given amlodipine in the emergency room.   were consulted and the patient was admitted to the medical floor as there was no safe discharge plan with Charles catheter.  Urology consult was ordered by the ED provider.    Review Of Systems:    All systems were reviewed and negative except for:  Urinary retention.    Past Medical History: Patient  has a past medical history of Bipolar 1 disorder (CMS/[facility]), Cancer (CMS/[facility]), Depression, Diabetes mellitus (CMS/[facility]), and Hypertension.    Past Surgical History: Patient  has a past surgical history that includes Testicle surgery and Testicle surgery.    Social History: Patient  reports that he has never smoked. His smokeless tobacco use includes chew. He reports current drug use. Drug: Methamphetamines. He reports that he does not drink alcohol.    Family History: Patient's family history is not on file.    Allergies:      Patient has no known allergies.    Home Medications:    Prior to Admission Medications     Prescriptions Last Dose Informant Patient Reported? Taking?    amLODIPine (NORVASC) 5 MG tablet Unknown  No No    Take 1 tablet by mouth Daily     mupirocin (BACTROBAN) 2 % ointment Unknown  Yes No    QUEtiapine (SEROquel) 200 MG tablet Unknown  No No    Take 1 tablet by mouth Every Night        ED Medications:    Medications   sodium chloride 0.9 % flush 10 mL (has no administration in time range)   amLODIPine (NORVASC) tablet 5 mg (5 mg Oral Given 6/6/21 1337)   carvedilol (COREG) tablet 6.25 mg (has no administration in time range)   cefTRIAXone (ROCEPHIN) 350 mg/ml in lidocaine 1% IM syringe (500 mg vial) (490 mg Intramuscular Given 6/6/21 1224)   doxycycline (MONODOX) capsule 100 mg (100 mg Oral Given 6/6/21 1224)   tamsulosin (FLOMAX) 24 hr capsule 0.4 mg (0.4 mg Oral Given 6/6/21 1430)     Vital Signs:  Temp:  [98.1 °F (36.7 °C)-98.6 °F (37 °C)] 98.6 °F (37 °C)  Heart Rate:  [102-138] 102  Resp:  [17-22] 17  BP: (157-184)/(108-139) 157/116        06/06/21  1148 06/06/21  1447   Weight: 55.1 kg (121 lb 8 oz) 51 kg (112 lb 8 oz)     Body mass index is 18.16 kg/m².    Physical Exam:     General Appearance:  Alert and cooperative.  Comfortable at rest.   Head:  Atraumatic and normocephalic.   Eyes: Conjunctivae and sclerae normal, no icterus. No  pallor.   Ears:  Ears with no abnormalities noted.   Throat: No oral lesions, no thrush, oral mucosa moist.   Neck: Supple, trachea midline, no thyromegaly.   Back:   No kyphoscoliosis present. No tenderness to palpation.   Lungs:   Breath sounds heard bilaterally equally.  No crackles or wheezing. No Pleural rub or bronchial breathing.   Heart:  Normal S1 and S2, no murmur, no gallop, no rub. No JVD.   Abdomen:   Normal bowel sounds, no masses, no organomegaly. Soft, nontender, nondistended, no rebound tenderness.  Status post left orchiectomy.  Charles catheter is in place.   Extremities: Supple, no edema, no cyanosis, no clubbing.  Poor muscle mass noted.   Pulses: Pulses palpable bilaterally.   Skin: No bleeding or rash.   Neurologic: Alert and oriented x 3. No facial asymmetry. Moves all four limbs. No tremors.      Laboratory data:    I have reviewed the labs done in the emergency room.    Results from last 7 days   Lab Units 06/06/21  1224   COLOR UA  Yellow   GLUCOSE UA  Negative   KETONES UA  Negative   LEUKOCYTES UA  Negative   PH, URINE  7.0   BILIRUBIN UA  Negative   UROBILINOGEN UA  0.2 E.U./dL     Pain Management Panel     Pain Management Panel Latest Ref Rng & Units 6/6/2021 5/6/2021    AMPHETAMINES SCREEN, URINE Negative Negative Positive(A)    BARBITURATES SCREEN Negative Negative Negative    BENZODIAZEPINE SCREEN, URINE Negative Negative Negative    BUPRENORPHINEUR Negative Negative Negative    COCAINE SCREEN, URINE Negative Negative Negative    METHADONE SCREEN, URINE Negative Negative Negative    METHAMPHETAMINEUR Negative Positive(A) Positive(A)        Radiology:    No radiology results for the last 3 days    Assessment:    1.  Acute urinary retention of uncertain etiology, POA.  2.  Suspected benign prostatic hyperplasia.  3.  History of testicular cancer status post orchiectomy 2009.  4.  Essential hypertension, uncontrolled, POA.  5.  Medical noncompliance.  6.  Moderate malnutrition with a BMI  of 18.    Plan:    Mr. Claire is currently being admitted to the medical floor with telemetry.  He is currently comfortable but does have uncontrolled hypertension.  We will continue amlodipine and I will place him on carvedilol twice daily.  He will be placed on IV hydralazine as needed for blood pressure control.    I will start him on Flomax as well.  We will consult Dr. Hu from urology for further recommendations.  He may benefit from a trial of voiding after discontinuation of Charles catheter.  Since he is homeless at this time, he may not do well with indwelling Charles catheter.  He is agreeable for in and out catheterization if he can get the supplies.  Case management and  will be consulted for discharge planning.    We will get blood work including CMP and CBC in the morning.  We will also obtain bilateral renal ultrasound to rule out any obstructive uropathy.  His CODE STATUS is full code.    Risk Assessment: Low to moderate.  DVT Prophylaxis: SCDs.  Code Status: Full code.  Diet: Cardiac.    Advance Care Planning      ACP discussion was held with the patient during this visit. Patient does not have an advance directive, declines further assistance.      Gordon Jules MD  06/06/21  17:04 EDT    Dictated utilizing Dragon dictation.

## 2021-06-06 NOTE — ED NOTES
"Spoke with April  about possible options for patient for placement. Ester BARRAGAN aware of this. Possible option to admit patient for social work consult tomorrow to possibly be placed somewhere. If admitted, \"social work consult\" needs to be ordered once admitted to floor.      Andria Rosen RN  06/06/21 4157    "

## 2021-06-06 NOTE — ED PROVIDER NOTES
"Subjective   History of Present Illness  This is a 50-year-old gentleman who comes in today complaining of penile discharge, dysuria, and fullness in his bladder.  He reports he has a history of having to self cath and bladder leakage.  He reports he has to wear \"diapers\" because he leaks.  He states he has not had any catheter material in several weeks.  He states he is homeless because he was living with his mother and she passed away in [place].  He moved to [place] where his brother lives but they do not have any room for him to stay.  He does report he is trying to work at [employer] has not had a paycheck yet.  He does report using recreational drugs.  Review of Systems   Constitutional: Negative.    HENT: Negative.    Eyes: Negative.    Respiratory: Negative.    Cardiovascular: Negative.    Gastrointestinal: Positive for abdominal pain.   Endocrine: Negative.    Genitourinary: Positive for discharge, dysuria and testicular pain.   Musculoskeletal: Negative.    Skin: Negative.    Allergic/Immunologic: Negative.    Neurological: Negative.    Hematological: Negative.    Psychiatric/Behavioral: Negative.        Past Medical History:   Diagnosis Date   • Bipolar 1 disorder (CMS/HCC)    • Cancer (CMS/[facility])     testicular   • Depression    • Diabetes mellitus (CMS/[facility])    • Hypertension        No Known Allergies    Past Surgical History:   Procedure Laterality Date   • TESTICLE SURGERY     • TESTICLE SURGERY         History reviewed. No pertinent family history.    Social History     Socioeconomic History   • Marital status: Single     Spouse name: Not on file   • Number of children: Not on file   • Years of education: Not on file   • Highest education level: Not on file   Tobacco Use   • Smoking status: Never Smoker   • Smokeless tobacco: Current User     Types: Chew   Substance and Sexual Activity   • Alcohol use: No   • Drug use: Yes     Types: Methamphetamines     Comment: 1 week ago           Objective " "  Physical Exam  Vitals and nursing note reviewed.   Constitutional:       Appearance: Normal appearance. He is normal weight.   Neurological:      Mental Status: He is alert.     GEN: No acute distress  Head: Normocephalic, atraumatic  Eyes: Pupils equal round reactive to light  ENT: Posterior pharynx normal in appearance, oral mucosa is moist  Chest: Nontender to palpation  Cardiovascular: Regular rate  Lungs: Clear to auscultation bilaterally  Abdomen: Soft, mildly tender, nondistended, no peritoneal signs  Extremities: No edema, normal appearance  Neuro: GCS 15  Psych: Mood and affect are appropriate    Procedures           ED Course  ED Course as of Jun 06 1334   Sun Jun 06, 2021   1300 The patient reports the past  he has been seen by states \" he has a mind of a child\" he is living on the streets and is requiring a indwelling medina catheter. The concern is infection with him not having the resources to care for himself. We will contact  today for possible resources to assist him today.     [TW]      ED Course User Index  [TW] Ester Wilkes, APRN                                           MDM  Number of Diagnoses or Management Options     Amount and/or Complexity of Data Reviewed  Clinical lab tests: reviewed and ordered  Review and summarize past medical records: yes  Discuss the patient with other providers: yes    Risk of Complications, Morbidity, and/or Mortality  Presenting problems: low  Diagnostic procedures: low  Management options: low        Final diagnoses:   Urinary retention       ED Disposition  ED Disposition     ED Disposition Condition Comment    Decision to Admit  Level of Care: Med/Surg [1]   Diagnosis: Urinary retention [[id number]]   Admitting Physician: ABI JONES [[id number]]   Attending Physician: ABI JONES [[id number]]            No follow-up provider specified.       Medication List      No changes were made to your prescriptions during this visit.        "   Ester Wilkes, APRN  06/06/21 2122

## 2021-06-07 ENCOUNTER — APPOINTMENT (OUTPATIENT)
Dept: ULTRASOUND IMAGING | Facility: HOSPITAL | Age: 51
End: 2021-06-07

## 2021-06-07 VITALS
HEIGHT: 66 IN | HEART RATE: 92 BPM | OXYGEN SATURATION: 99 % | WEIGHT: 112.43 LBS | TEMPERATURE: 98.2 F | DIASTOLIC BLOOD PRESSURE: 114 MMHG | SYSTOLIC BLOOD PRESSURE: 161 MMHG | BODY MASS INDEX: 18.07 KG/M2 | RESPIRATION RATE: 18 BRPM

## 2021-06-07 LAB
ALBUMIN SERPL-MCNC: 3.6 G/DL (ref 3.5–5.2)
ALBUMIN/GLOB SERPL: 1.2 G/DL
ALP SERPL-CCNC: 118 U/L (ref 39–117)
ALT SERPL W P-5'-P-CCNC: 20 U/L (ref 1–41)
ANION GAP SERPL CALCULATED.3IONS-SCNC: 9.5 MMOL/L (ref 5–15)
AST SERPL-CCNC: 30 U/L (ref 1–40)
BASOPHILS # BLD AUTO: 0.07 10*3/MM3 (ref 0–0.2)
BASOPHILS NFR BLD AUTO: 0.8 % (ref 0–1.5)
BILIRUB SERPL-MCNC: 0.3 MG/DL (ref 0–1.2)
BUN SERPL-MCNC: 29 MG/DL (ref 6–20)
BUN/CREAT SERPL: 22.7 (ref 7–25)
CALCIUM SPEC-SCNC: 9.4 MG/DL (ref 8.6–10.5)
CHLORIDE SERPL-SCNC: 101 MMOL/L (ref 98–107)
CO2 SERPL-SCNC: 27.5 MMOL/L (ref 22–29)
CREAT SERPL-MCNC: 1.28 MG/DL (ref 0.76–1.27)
DEPRECATED RDW RBC AUTO: 47.8 FL (ref 37–54)
EOSINOPHIL # BLD AUTO: 0.23 10*3/MM3 (ref 0–0.4)
EOSINOPHIL NFR BLD AUTO: 2.7 % (ref 0.3–6.2)
ERYTHROCYTE [DISTWIDTH] IN BLOOD BY AUTOMATED COUNT: 14.9 % (ref 12.3–15.4)
GFR SERPL CREATININE-BSD FRML MDRD: 60 ML/MIN/1.73
GLOBULIN UR ELPH-MCNC: 3.1 GM/DL
GLUCOSE SERPL-MCNC: 91 MG/DL (ref 65–99)
HCT VFR BLD AUTO: 37.2 % (ref 37.5–51)
HGB BLD-MCNC: 11.9 G/DL (ref 13–17.7)
IMM GRANULOCYTES # BLD AUTO: 0.04 10*3/MM3 (ref 0–0.05)
IMM GRANULOCYTES NFR BLD AUTO: 0.5 % (ref 0–0.5)
LYMPHOCYTES # BLD AUTO: 2.4 10*3/MM3 (ref 0.7–3.1)
LYMPHOCYTES NFR BLD AUTO: 28.1 % (ref 19.6–45.3)
MCH RBC QN AUTO: 28.2 PG (ref 26.6–33)
MCHC RBC AUTO-ENTMCNC: 32 G/DL (ref 31.5–35.7)
MCV RBC AUTO: 88.2 FL (ref 79–97)
MONOCYTES # BLD AUTO: 0.64 10*3/MM3 (ref 0.1–0.9)
MONOCYTES NFR BLD AUTO: 7.5 % (ref 5–12)
NEUTROPHILS NFR BLD AUTO: 5.16 10*3/MM3 (ref 1.7–7)
NEUTROPHILS NFR BLD AUTO: 60.4 % (ref 42.7–76)
NRBC BLD AUTO-RTO: 0 /100 WBC (ref 0–0.2)
PLATELET # BLD AUTO: 508 10*3/MM3 (ref 140–450)
PMV BLD AUTO: 8.1 FL (ref 6–12)
POTASSIUM SERPL-SCNC: 3.8 MMOL/L (ref 3.5–5.2)
PROT SERPL-MCNC: 6.7 G/DL (ref 6–8.5)
RBC # BLD AUTO: 4.22 10*6/MM3 (ref 4.14–5.8)
SODIUM SERPL-SCNC: 138 MMOL/L (ref 136–145)
WBC # BLD AUTO: 8.54 10*3/MM3 (ref 3.4–10.8)

## 2021-06-07 PROCEDURE — 80053 COMPREHEN METABOLIC PANEL: CPT | Performed by: INTERNAL MEDICINE

## 2021-06-07 PROCEDURE — G0378 HOSPITAL OBSERVATION PER HR: HCPCS

## 2021-06-07 PROCEDURE — 99217 PR OBSERVATION CARE DISCHARGE MANAGEMENT: CPT | Performed by: INTERNAL MEDICINE

## 2021-06-07 PROCEDURE — 99218 PR INITIAL OBSERVATION CARE/DAY 30 MINUTES: CPT | Performed by: UROLOGY

## 2021-06-07 PROCEDURE — 96361 HYDRATE IV INFUSION ADD-ON: CPT

## 2021-06-07 PROCEDURE — 96374 THER/PROPH/DIAG INJ IV PUSH: CPT

## 2021-06-07 PROCEDURE — 76775 US EXAM ABDO BACK WALL LIM: CPT

## 2021-06-07 PROCEDURE — 25010000002 HYDRALAZINE PER 20 MG: Performed by: INTERNAL MEDICINE

## 2021-06-07 PROCEDURE — 85025 COMPLETE CBC W/AUTO DIFF WBC: CPT | Performed by: INTERNAL MEDICINE

## 2021-06-07 RX ORDER — SODIUM CHLORIDE 9 MG/ML
100 INJECTION, SOLUTION INTRAVENOUS CONTINUOUS
Status: DISCONTINUED | OUTPATIENT
Start: 2021-06-07 | End: 2021-06-08 | Stop reason: HOSPADM

## 2021-06-07 RX ADMIN — QUETIAPINE FUMARATE 200 MG: 100 TABLET ORAL at 21:12

## 2021-06-07 RX ADMIN — SODIUM CHLORIDE 100 ML/HR: 9 INJECTION, SOLUTION INTRAVENOUS at 15:05

## 2021-06-07 RX ADMIN — TAMSULOSIN HYDROCHLORIDE 0.4 MG: 0.4 CAPSULE ORAL at 21:12

## 2021-06-07 RX ADMIN — SODIUM CHLORIDE, PRESERVATIVE FREE 3 ML: 5 INJECTION INTRAVENOUS at 10:00

## 2021-06-07 RX ADMIN — CARVEDILOL 6.25 MG: 6.25 TABLET, FILM COATED ORAL at 17:54

## 2021-06-07 RX ADMIN — HYDRALAZINE HYDROCHLORIDE 10 MG: 20 INJECTION INTRAMUSCULAR; INTRAVENOUS at 21:12

## 2021-06-07 NOTE — PROGRESS NOTES
Called by nursing regarding agitation.  I evaluated the patient at bedside.  He is admitted for urinary retention and told the nurse that he wants to leave so he had the Charles catheter pulled out. He is oriented x3, but does initially mention the year is 2001 several times before correcting himself to 2021.  He is aware that the president is Giovany Soto.  He does not voice any complaints to myself and says he wants to go to sleep.  I have ordered as needed Haldol IM for agitation.  No sedatives such as benzodiazepines noted in the UDS worrisome for withdrawal.  We will monitor him closely and reassess for retention in the morning specially if he gets more confused or agitated with bladder scan.

## 2021-06-07 NOTE — NURSING NOTE
"Pt restless, agitated, thrashing about in bed. PRN haldol IM given. Pt demanding f/c be removed. Explained pt reason for having f/c placed. Pt became irritated and states \"If you don't pull this out, I will!\" while he proceeds to pull at catheter line. Urine in catheter tubing noted to be pink tinged. Bladder scan done showing 0ml. Pt's o2 sats 97% on RA. Pt continues to be very adamant about taking f/c out and states he will leave if not done. F/C removed per pt request. Urinal placed at bedside. Bed alarm set. Will inform Dr. Cao.   "

## 2021-06-07 NOTE — CONSULTS
Reason for Consult  Hematuria    Consulting physician  YUNG Foss  Mr. Claire is a 50 y.o. male with PMHx of bipolar disorder, testicular cancer, who presented with urinary retention.    He admits to a longstanding history of urinary retention and has performed intermittent self-catheterization in the past.  He is a difficult historian and is unsure why he has had to catheterize himself.  He had an orchiectomy at  in 2009 he says and has had problems emptying his bladder and has been told to catheterize himself in the past by them.  He has not done so recently.    He was recently found to be in urinary retention in the ER admitted to hospital Charles catheter was placed, which has been discontinued.  He was positive for methamphetamines on 6/6/2021 on admission.    Past Medical History  Past Medical History:   Diagnosis Date   • Bipolar 1 disorder (CMS/HCC)    • Cancer (CMS/[facility])     testicular   • Depression    • Diabetes mellitus (CMS/[facility])    • Hypertension        Past Surgical History  Past Surgical History:   Procedure Laterality Date   • TESTICLE SURGERY     • TESTICLE SURGERY         Medications    Current Facility-Administered Medications:   •  acetaminophen (TYLENOL) tablet 650 mg, 650 mg, Oral, Q4H PRN **OR** acetaminophen (TYLENOL) 160 MG/5ML solution 650 mg, 650 mg, Oral, Q4H PRN **OR** acetaminophen (TYLENOL) suppository 650 mg, 650 mg, Rectal, Q4H PRN, Gordon Jules MD  •  amLODIPine (NORVASC) tablet 5 mg, 5 mg, Oral, Q24H, Ester Wilkes APRN, 5 mg at 06/06/21 1337  •  carvedilol (COREG) tablet 6.25 mg, 6.25 mg, Oral, BID With Meals, Gordon Jules MD, 6.25 mg at 06/07/21 1754  •  hydrALAZINE (APRESOLINE) injection 10 mg, 10 mg, Intravenous, Q4H PRN, Gordon Jules MD  •  ondansetron (ZOFRAN) injection 4 mg, 4 mg, Intravenous, Q6H PRN, Gordon Jules MD  •  QUEtiapine (SEROquel) tablet 200 mg, 200 mg, Oral, Nightly, Gordon Jules MD, 200 mg at 06/06/21 2013  •  sodium chloride 0.9 %  "flush 10 mL, 10 mL, Intravenous, PRN, Ester Wilkes, APRN  •  sodium chloride 0.9 % flush 3 mL, 3 mL, Intravenous, Q12H, Gordon Jules MD, 3 mL at 06/07/21 1000  •  sodium chloride 0.9 % flush 3-10 mL, 3-10 mL, Intravenous, PRN, Gordon Jules MD  •  sodium chloride 0.9 % infusion, 100 mL/hr, Intravenous, Continuous, Xiao Zafar APRN, Last Rate: 100 mL/hr at 06/07/21 1753, 100 mL/hr at 06/07/21 1753  •  tamsulosin (FLOMAX) 24 hr capsule 0.4 mg, 0.4 mg, Oral, Nightly, Gordon Jules MD    Allergies  No Known Allergies    Social History  Social History     Socioeconomic History   • Marital status: Single     Spouse name: Not on file   • Number of children: Not on file   • Years of education: Not on file   • Highest education level: Not on file   Tobacco Use   • Smoking status: Never Smoker   • Smokeless tobacco: Current User     Types: Chew   Substance and Sexual Activity   • Alcohol use: No   • Drug use: Yes     Types: Methamphetamines     Comment: last used 06/04/21       Family History  History reviewed. No pertinent family history.    Review of Systems  Constitutional: No fevers  Skin: Negative for rash  Endocrine: No heat/cold intolerance   Cardiovascular: Negative for chest pain   Respiratory: Negative for shortness of breath or wheezing  Gastrointestinal: No constipation, nausea or vomiting  Genitourinary: Positive for urinary retention  Musculoskeletal: No flank pain  Neurological:  Negative for frequent headaches or dizziness  Lymph/Heme: Negative for leg swelling or calf pain.    Physical Exam  BP (!) 186/119 Comment: scheduled carvedilol given  Pulse 94   Temp 97.8 °F (36.6 °C) (Oral)   Resp 18   Ht 167.6 cm (66\")   Wt 51 kg (112 lb 7 oz)   SpO2 98%   BMI 18.15 kg/m²   Constitutional: NAD, WDWN.   HEENT: NCAT. Conjunctivae normal.  MMM.    Cardiovascular: Regular rate.  Pulmonary/Chest: Respirations are even and non-labored bilaterally.  Abdominal: Soft. No distension, tenderness, masses or " guarding. No CVA tenderness.  Neurological: A + O. Cranial Nerves II-XII grossly intact.   Extremities: CHARITO x 4, Warm. No clubbing.  No cyanosis.    Skin: Warm and dry.  No rashes noted.  Psychiatric:  Normal mood and affect    Genitourinary  Penis: circumcised penis, glans normal, no penile discharge.  No rashes/lesions.    Testes: 1 testicle surgically absent    I/O last 3 completed shifts:  In: 250 [P.O.:250]  Out: 1475 [Urine:1475]    Labs  Lab Results   Component Value Date    GLUCOSE 91 06/07/2021    CALCIUM 9.4 06/07/2021     06/07/2021    K 3.8 06/07/2021    CO2 27.5 06/07/2021     06/07/2021    BUN 29 (H) 06/07/2021    CREATININE 1.28 (H) 06/07/2021    EGFRIFNONA 60 (L) 06/07/2021    BCR 22.7 06/07/2021    ANIONGAP 9.5 06/07/2021       Lab Results   Component Value Date    WBC 8.54 06/07/2021    HGB 11.9 (L) 06/07/2021    HCT 37.2 (L) 06/07/2021    MCV 88.2 06/07/2021     (H) 06/07/2021       Brief Urine Lab Results  (Last result in the past 365 days)      Color   Clarity   Blood   Leuk Est   Nitrite   Protein   CREAT   Urine HCG        06/06/21 1224 Yellow Clear Negative Negative Negative Negative               No results found for: URINECX    Radiographic Studies  US Renal Limited    Result Date: 6/7/2021  PROCEDURE: US RENAL LIMITED-  HISTORY: Rule out obstruction; R33.9-Retention of urine, unspecified  PROCEDURE: Ultrasound images of the kidneys were obtained.  FINDINGS:.  The kidneys are normal in size and echogenicity with the right kidney measuring 9.6 cm and the left kidney measuring 10.3 cm. There is no cystic or solid mass. There is no hydronephrosis.      Normal renal ultrasound.  This report was finalized on 6/7/2021 4:07 PM by Melissa Rosas M.D..      I have personally reviewed these labs and imaging.     Assessment  Mr. Claire is a 50 y.o. male with a PMHx of testicular cancer, status post orchiectomy in 2009 and unclear medical history of urinary retention who has  performed intermittent self cath and presents with urinary retention azotemia, which resolved.  Charles catheter was then removed.      Plan  1.  Recommend patient continue to follow-up at [facility] as he has not done so in a while and they are familiar with his past surgical history and whether or not he has had urodynamics.    Jj Hu MD

## 2021-06-07 NOTE — PLAN OF CARE
Goal Outcome Evaluation:  Plan of Care Reviewed With: patient        Progress: improving  Outcome Summary: Pt still continues to be restless today, but cooperative.  Voiding spontaneously in urinal.  Post void residual completed with 217mL of urine in bladder - Dr. Hu notified.

## 2021-06-07 NOTE — PLAN OF CARE
Goal Outcome Evaluation:  Plan of Care Reviewed With: patient  Progress: no change  Outcome Summary: pt became agitated, restless, demanded F/C removal, required dose of haldol IM, BP dropped after haldol 84/62, BP improved this am. Pt refused urge to void after f/c removed and refused to get up to try this AM Bladder scan showing 111ml at this time.

## 2021-06-07 NOTE — PROGRESS NOTES
[facility]    [facility]    Name:  Stefano Claire   Age:  50 y.o.  Sex:  male  :  1970  MRN:  [id number]   Visit Number:  [id number]  Admission Date:  2021  Date Of Service:  21  Primary Care Physician:  Zaira Dubois APRN     LOS: 0 days :    Chief Complaint:      Difficulty urinating    Subjective:    Patient seen and evaluated today after being admitted yesterday evening.  Patient is noted to have history of testicular cancer in  with orchiectomy that presented to the emergency department with inability to void.  Patient is homeless at this time, but does report just getting a job here in town.  He does have 2 brothers who are unable to help him at this time.  Patient does not have any primary care and tells me he stays on the streets.  Patient does occasionally use Meth per his report, but not all the time.  Patient with last reported use Friday.  Patient had medina catheter placed on arrival but it was removed last evening after he became agitated.  Patient has voided in urinal on exam today.  Does not report any pain or other problems on exam today.    Review of Systems:     All systems were reviewed and negative except as mentioned in subjective, assessment and plan.    Vital Signs:    Temp:  [97.1 °F (36.2 °C)-98.6 °F (37 °C)] 97.7 °F (36.5 °C)  Heart Rate:  [] 71  Resp:  [17-18] 18  BP: ()/() 120/87    Intake and output:    I/O last 3 completed shifts:  In: 250 [P.O.:250]  Out: 1475 [Urine:1475]  I/O this shift:  In: 480 [P.O.:480]  Out: 200 [Urine:200]    Physical Examination:    General Appearance:  Alert and cooperative, resting in bed on exam.  Appears very fidgety on exam, middle aged male.    Head:  Atraumatic and normocephalic.   Eyes: Conjunctivae and sclerae normal, no icterus. No pallor.   Throat: No oral lesions, no thrush, oral mucosa moist.   Neck: Supple, trachea midline   Lungs:   Breath sounds heard  bilaterally equally.  No crackles or wheezing. No Pleural rub or bronchial breathing.   Heart:  Normal S1 and S2, no murmur, no gallop, no rub. No JVD.   Abdomen:   Normal bowel sounds, no masses, no organomegaly. Soft, nontender, nondistended, no rebound tenderness.   Extremities: Supple, no edema, no cyanosis, no clubbing.   Skin: No bleeding or rash.   Neurologic: Alert and oriented x 3. No facial asymmetry. Moves all four limbs. No tremors.      Laboratory results:    Results from last 7 days   Lab Units 06/07/21  0645   SODIUM mmol/L 138   POTASSIUM mmol/L 3.8   CHLORIDE mmol/L 101   CO2 mmol/L 27.5   BUN mg/dL 29*   CREATININE mg/dL 1.28*   CALCIUM mg/dL 9.4   BILIRUBIN mg/dL 0.3   ALK PHOS U/L 118*   ALT (SGPT) U/L 20   AST (SGOT) U/L 30   GLUCOSE mg/dL 91     Results from last 7 days   Lab Units 06/07/21  0645   WBC 10*3/mm3 8.54   HEMOGLOBIN g/dL 11.9*   HEMATOCRIT % 37.2*   PLATELETS 10*3/mm3 508*                     I have reviewed the patient's laboratory results.    Radiology results:    No radiology results from the last 24 hrs  I have reviewed the patient's radiology reports.    Medication Review:     I have reviewed the patient's active and prn medications.     Problem List:      Urinary retention    Moderate malnutrition (CMS/HCC)    Essential hypertension      Assessment:    1.  Acute urinary retention of uncertain etiology, POA.  2.  Suspected benign prostatic hyperplasia.  3.  History of testicular cancer status post orchiectomy 2009.  4.  Essential hypertension, uncontrolled, POA.  5.  Medical noncompliance.  6.  Moderate malnutrition with a BMI of 18.    Plan:    Hypertension appears to be well controlled with Amlodipine and Carvedilol PO.  Flomax has been initiated.  Urology consult pending.  Patient did have catheter discontinued last evening per his request.  Patient has voided since catheter removal.  He reports that he has done intermittent in & out catheterization since he had testicular  cancer.  Patient is pending renal ultrasound to rule out any obstructive uropathy.  Patient is agreeable to plan of care at this time.  Case management has been consulted as patient will need supplies at discharge and good follow up since he tells me that he lives on the streets most of the time.  Creatinine did increase today, so gentle IV fluids were started.        DVT Prophylaxis: SCDs  Code Status: Full Code  Diet: Cardiac  Discharge Plan: ??    Xiao Zafar, YUNG  06/07/21  13:49 EDT    Dictated utilizing Dragon dictation.

## 2021-06-07 NOTE — CONSULTS
Adult Nutrition  Assessment/PES    Patient Name:  Stefano Claire  YOB: 1970  MRN: [id number]  Admit Date:  6/6/2021    Assessment Date:  6/7/2021    Comments:    Recommend:  1. Continue current diet order as medically appropriate and tolerated.  2. Continue to encourage PO intake. Current intake 100% x 2 meals.   3. Consider a multivitamin with minerals daily.  4. RD completed MSA with NFPE; pt meets criteria for moderate malnutrition with a BMI of 18.15.     RD to follow pt and available PRN.        Reason for Assessment     Row Name 06/07/21 1135          Reason for Assessment    Reason For Assessment  nurse/nurse practitioner consult;identified at risk by screening criteria     Diagnosis  substance use/abuse;cardiac disease high blood pressure, methamphetamine use, IV drug user, retention of urine     Identified At Risk by Screening Criteria  BMI;MST SCORE 2+           Anthropometrics     Row Name 06/07/21 0500          Anthropometrics    Weight  51 kg (112 lb 7 oz)         Labs/Tests/Procedures/Meds     Row Name 06/07/21 1137          Labs/Procedures/Meds    Lab Results Reviewed  reviewed, pertinent     Lab Results Comments  High: BUN, Cr, platelets        Medications    Pertinent Medications Reviewed  reviewed, pertinent         Physical Findings     Row Name 06/07/21 1137          Physical Findings    Overall Physical Appearance  underweight         Estimated/Assessed Needs     Row Name 06/07/21 1138          Calculation Measurements    Weight Used For Calculations  51 kg (112 lb 7 oz) actual BW        Estimated/Assessed Needs    Additional Documentation  Calorie Requirements (Group);Protein Requirements (Group);[place]. Jeor Equation (Group);Fluid Requirements (Group)        Calorie Requirements    Estimated Calorie Need Method  [place]-St Carlosor     Estimated Calorie Requirement Comment  1851-1984        [place]. Jeor Equation    RMR ([place]-St. Jeor Equation)  1322.75      [facility] Carlosor Activity Factors  1.4 - 1.5     Activity Factors ([facility] Carlosor)  1851.85 - 1984.125        Protein Requirements    Weight Used For Protein Calculations  51 kg (112 lb 7 oz) actual BW     Est Protein Requirement Amount (gms/kg)  1.5 gm protein 61-77 grams     Estimated Protein Requirements (gms/day)  76.5        Fluid Requirements    Estimated Fluid Requirement Method  other (see comments) 1 mL/kcal         Nutrition Prescription Ordered     Row Name 06/07/21 1140          Nutrition Prescription PO    Current PO Diet  Regular     Common Modifiers  Cardiac         Evaluation of Received Nutrient/Fluid Intake     Row Name 06/07/21 1141          PO Evaluation    Number of Days PO Intake Evaluated  1 day     Number of Meals  2     % PO Intake  100           Malnutrition Severity Assessment     Row Name 06/07/21 1145          Malnutrition Severity Assessment    Malnutrition Type  Chronic Disease - Related Malnutrition        Muscle Loss    Loss of Muscle Mass Findings  Moderate     Scientologist Region  Moderate - slight depression     Clavicle Bone Region  Moderate - some protrusion in females, visible in males     Acromion Bone Region  Severe - squared shoulders, bones, and acromion process protrusion prominent     Dorsal Hand Region  Moderate - slight depression     Anterior Thigh Region  Moderate - mild depression on inner thigh     Posterior Calf Region  Moderate - some roundness, slight firmness        Fat Loss    Subcutaneous Fat Loss Findings  Moderate     Orbital Region   Moderate -  somewhat hollowness, slightly dark circles     Upper Arm Region  Moderate - some fat tissue, not ample        Criteria Met (Must meet criteria for severity in at least 2 of these categories: M Wasting, Fat Loss, Fluid, Secondary Signs, Wt. Status, Intake)    Patient meets criteria for   Moderate (non-severe) Malnutrition           Problem/Interventions:  Problem 1     Row Name 06/07/21 1141          Nutrition Diagnoses  Problem 1    Problem 1  Underweight     Etiology (related to)  Factors Affecting Nutrition     Food Habit/Preferences  Small Meals     Mental State/Condition  Other Homelessness     Signs/Symptoms (evidenced by)  BMI     BMI  18 - 18.9               Intervention Goal     Row Name 06/07/21 1144          Intervention Goal    General  Improved nutrition related lab(s);Meet nutritional needs for age/condition     PO  Maintain intake;Meet estimated needs     Weight  Appropriate weight gain         Nutrition Intervention     Row Name 06/07/21 1144          Nutrition Intervention    RD/Tech Action  Follow Tx progress;Encourage intake;Recommend/ordered     Recommended/Ordered  --         Nutrition Prescription     Row Name 06/07/21 1145          Nutrition Prescription PO    PO Prescription  Other (comment) Continue current diet order as medically appropriate and tolerated     New PO Prescription Ordered?  No, recommended        Other Orders    Obtain Weight  Daily     Obtain Weight Ordered?  No, recommended     Supplement  Vitamin mineral supplement     Supplement Ordered?  No, recommended         Education/Evaluation     Row Name 06/07/21 1145          Education    Education  Will Instruct as appropriate        Monitor/Evaluation    Monitor  Per protocol;I&O;PO intake;Pertinent labs;Weight;Skin status           Electronically signed by:  Lary Price RD  06/07/21 11:47 EDT

## 2021-06-07 NOTE — CASE MANAGEMENT/SOCIAL WORK
Discharge Planning Assessment  [facility]     Patient Name: Stefano Claire  MRN: [id number]  Today's Date: 6/7/2021    Admit Date: 6/6/2021    Discharge Needs Assessment     Row Name 06/07/21 1515       Living Environment    Lives With  alone    Unique Family Situation  homeless    Primary Care Provided by  self    Provides Primary Care For  no one    Able to Return to Prior Arrangements  yes    Living Arrangement Comments  living on street, just started restaurant job       Resource/Environmental Concerns    Transportation Concerns  car, none       Discharge Needs Assessment    Readmission Within the Last 30 Days  no previous admission in last 30 days    Concerns to be Addressed  discharge planning    Outpatient/Agency/Support Group Needs  adult     Discharge Coordination/Progress  streets        Discharge Plan     Row Name 06/07/21 1517       Plan    Plan  Discharge planning, verified patient homeless. Family in [place]. Encouraged meds to beds program. No home health, dme or o2. Just started restaurant job. Provided low income housing list and phone access. Encouraged him to call to get on lists. Claims he is banned from [facility]. Informed him shelter here closed. He is sleeping outside. Has no income. Supposed to get paid Wednesday. No car. No room with family in [place]. Explained has to interview to get in [facility]. Unsure what he wants to do. Agreed I would check back with him in am. Will need transport.        Continued Care and Services - Admitted Since 6/6/2021    Coordination has not been started for this encounter.       Expected Discharge Date and Time     Expected Discharge Date Expected Discharge Time    Jun 9, 2021         Demographic Summary     Row Name 06/07/21 1514       General Information    Admission Type  observation    Arrived From  other (see comments)    Expected Length of Stay (LOS)  3    Referral Source  admission list    Reason for Consult   discharge planning    Preferred Language  English       Contact Information    Contact Information Comments  family in [place]        Functional Status     Row Name 06/07/21 9183       Functional Status, IADL    Medications  independent    Meal Preparation  independent    Housekeeping  independent    Laundry  independent    Shopping  independent       Employment/    Employment Status  employed part-time        Psychosocial    No documentation.       Abuse/Neglect    No documentation.       Legal    No documentation.       Substance Abuse    No documentation.       Patient Forms    No documentation.           Evie Haynes, ESVIN

## 2021-06-08 NOTE — NURSING NOTE
Patient left ama at 2145.  All risks of leaving and benefits of staying explained to patient with understanding verbalized.  Dr. Sosa notified of patient insistence on leaving and agreed that patient had to sign AMA if he insisted on leaving.  IV 20 gauge to right arm removed with cathlon intact.  Patient removed telemetry monitor and o2 sat monitor.  Dressed and left ambulatory at this time.

## 2021-06-08 NOTE — NURSING NOTE
Spoke with Dr. Sosa about patient wanting to leave.  Patient requests iv fluid disconnected.  Patient states why are you recording me.  Redirected patient to know he Is not being recorded.  Stopped IV fluids per patient request at this time.  Will continue to monitor

## 2021-06-08 NOTE — CASE MANAGEMENT/SOCIAL WORK
Case Management Discharge Note                Selected Continued Care - Discharged on 6/7/2021 Admission date: 6/6/2021 - Discharge disposition: Left Against Medical Advice    Destination    No services have been selected for the patient.              Durable Medical Equipment    No services have been selected for the patient.              Dialysis/Infusion    No services have been selected for the patient.              Home Medical Care    No services have been selected for the patient.              Therapy    No services have been selected for the patient.              Community Resources    No services have been selected for the patient.              Community & DME    No services have been selected for the patient.                       Final Discharge Disposition Code: 07 - left AMA

## 2021-06-09 LAB
C TRACH RRNA SPEC QL NAA+PROBE: NEGATIVE
N GONORRHOEA RRNA SPEC QL NAA+PROBE: NEGATIVE

## 2021-06-10 NOTE — DISCHARGE SUMMARY
[facility]   DISCHARGE SUMMARY      Name:  Stefano Claire   Age:  50 y.o.  Sex:  male  :  1970  MRN:  [id number]   Visit Number:  [id number]    Admission Date:  2021  Date of Discharge:  2021  Primary Care Physician:  Zaira Dubois APRN    Important issues to note:    Patient  has a past medical history of Bipolar 1 disorder (CMS/[facility]), Cancer (CMS/[facility]), Depression, Diabetes mellitus (CMS/[facility]), and Hypertension.    Discharge Diagnoses:     1.  Acute urinary retention of uncertain etiology, POA.  2.  Suspected benign prostatic hyperplasia.  3.  History of testicular cancer status post orchiectomy .  4.  Essential hypertension, uncontrolled, POA.  5.  Medical noncompliance.  6.  Moderate malnutrition with a BMI of 18.    Problem List:     Active Hospital Problems    Diagnosis  POA   • **Urinary retention [R33.9]  Yes   • Essential hypertension [I10]  Yes   • Moderate malnutrition (CMS/HCC) [E44.0]  Yes      Resolved Hospital Problems   No resolved problems to display.       Presenting Problem:    Chief Complaint   Patient presents with   • Penile Discharge   • Difficulty Urinating        Consults:     Consulting Physician(s)             None            Procedures Performed: see chart         History of presenting illness/Hospital Course:    Patient had been admitted  for Acute urinary retention  And as per H and P. He was seen by medicine team and other specialist. He had a medicine plan as stated below. But patient decided to leave AMA. Was warned that he could get sicker and injured and could either Die, but he refused medical treatment and left AMA.     Medicine plan:  1.  Acute urinary retention of uncertain etiology, POA.  2.  Suspected benign prostatic hyperplasia.  3.  History of testicular cancer status post orchiectomy .  4.  Essential hypertension, uncontrolled, POA.  5.  Medical noncompliance.  6.  Moderate malnutrition with a BMI of  18.     Plan:     Hypertension appears to be well controlled with Amlodipine and Carvedilol PO.  Flomax has been initiated.  Urology consult pending.  Patient did have catheter discontinued last evening per his request.  Patient has voided since catheter removal.  He reports that he has done intermittent in & out catheterization since he had testicular cancer.  Patient is pending renal ultrasound to rule out any obstructive uropathy.  Patient is agreeable to plan of care at this time.  Case management has been consulted as patient will need supplies at discharge and good follow up since he tells me that he lives on the streets most of the time.  Creatinine did increase today, so gentle IV fluids were started.      Vital Signs:     see chart    Physical Exam:    General Appearance:  Alert and cooperative.    Head:  Atraumatic and normocephalic.   Eyes: Conjunctivae and sclerae normal, no icterus. No pallor.   Ears:  Ears with no abnormalities noted.   Throat: No oral lesions, no thrush, oral mucosa moist.   Neck: Supple, trachea midline, no thyromegaly.   Back:   No kyphoscoliosis present. No tenderness to palpation.   Lungs:   Breath sounds heard bilaterally equally.  No crackles or wheezing. No Pleural rub or bronchial breathing.   Heart:  Normal S1 and S2, no murmur, no gallop, no rub. No JVD.   Abdomen:   Normal bowel sounds, no masses, no organomegaly. Soft, nontender, nondistended, no rebound tenderness.   Extremities: Supple, no edema, no cyanosis, no clubbing.   Pulses: Pulses palpable bilaterally.   Skin: No bleeding or rash.   Neurologic: Alert and oriented x 3. No facial asymmetry. Moves all four limbs. No tremors.     Pertinent Lab Results:     Results from last 7 days   Lab Units 06/07/21  0645   SODIUM mmol/L 138   POTASSIUM mmol/L 3.8   CHLORIDE mmol/L 101   CO2 mmol/L 27.5   BUN mg/dL 29*   CREATININE mg/dL 1.28*   CALCIUM mg/dL 9.4   BILIRUBIN mg/dL 0.3   ALK PHOS U/L 118*   ALT (SGPT) U/L 20   AST  (SGOT) U/L 30   GLUCOSE mg/dL 91     Results from last 7 days   Lab Units 06/07/21  0645   WBC 10*3/mm3 8.54   HEMOGLOBIN g/dL 11.9*   HEMATOCRIT % 37.2*   PLATELETS 10*3/mm3 508*                             Results from last 7 days   Lab Units 06/06/21  1224   COLOR UA  Yellow   GLUCOSE UA  Negative   KETONES UA  Negative   LEUKOCYTES UA  Negative   PH, URINE  7.0   BILIRUBIN UA  Negative   UROBILINOGEN UA  0.2 E.U./dL     Pain Management Panel     Pain Management Panel Latest Ref Rng & Units 6/6/2021 5/6/2021    AMPHETAMINES SCREEN, URINE Negative Negative Positive(A)    BARBITURATES SCREEN Negative Negative Negative    BENZODIAZEPINE SCREEN, URINE Negative Negative Negative    BUPRENORPHINEUR Negative Negative Negative    COCAINE SCREEN, URINE Negative Negative Negative    METHADONE SCREEN, URINE Negative Negative Negative    METHAMPHETAMINEUR Negative Positive(A) Positive(A)              Pertinent Radiology Results:    Imaging Results (All)     Procedure Component Value Units Date/Time    US Renal Limited [[id number]] Collected: 06/07/21 1607     Updated: 06/07/21 1610    Narrative:      PROCEDURE: US RENAL LIMITED-     HISTORY: Rule out obstruction; R33.9-Retention of urine, unspecified     PROCEDURE: Ultrasound images of the kidneys were obtained.     FINDINGS:.    The kidneys are normal in size and echogenicity with the right kidney  measuring 9.6 cm and the left kidney measuring 10.3 cm. There is no  cystic or solid mass. There is no hydronephrosis.       Impression:      Normal renal ultrasound.     This report was finalized on 6/7/2021 4:07 PM by Melissa Rosas M.D..          Echo:    Results for orders placed during the hospital encounter of 05/06/21    Adult Transthoracic Echo Complete W/ Cont if Necessary Per Protocol    Interpretation Summary  1.  Normal left ventricular size and systolic function, LVEF 60-65%.  2.  Mild concentric LVH.  3.  Normal LV diastolic filling pattern.  4.  Normal right  ventricular size and systolic function.  5.  Mild mitral regurgitation.  6.  Trace TR.      Condition on Discharge:      Stable.    Code status during the hospital stay:    Code Status and Medical Interventions:   Ordered at: 06/06/21 1728     Level Of Support Discussed With:    Patient     Code Status:    CPR     Medical Interventions (Level of Support Prior to Arrest):    Full       Discharge Disposition:    Left Against Medical Advice    Discharge Medications:       Discharge Medications      ASK your doctor about these medications      Instructions Start Date   amLODIPine 5 MG tablet  Commonly known as: NORVASC   Take 1 tablet by mouth Daily       mupirocin 2 % ointment  Commonly known as: BACTROBAN   No dose, route, or frequency recorded.      QUEtiapine 200 MG tablet  Commonly known as: SEROquel   Take 1 tablet by mouth Every Night             Discharge Diet:     left AMA    Activity at Discharge: left AMA        Follow-up Appointments:    Follow-up Information     Zaira Dubois, YUNG .    Specialty: Nurse Practitioner  Contact information:  [address] [place] [id number]  [phone]                   No future appointments.    Test Results Pending at Discharge:    left AMA       Zaire Sosa MD  06/09/21  20:12 EDT    Time: I spent 35 minutes on this discharge activity which included: face-to-face encounter with the patient, reviewing the data in the system, coordination of the care with the nursing staff as well as consultants, documentation, and entering orders.     Dictated utilizing Dragon dictation.

## 2021-07-22 ENCOUNTER — HOSPITAL ENCOUNTER (EMERGENCY)
Facility: HOSPITAL | Age: 51
Discharge: HOME | End: 2021-07-22
Attending: EMERGENCY MEDICINE | Admitting: EMERGENCY MEDICINE

## 2021-07-22 VITALS
RESPIRATION RATE: 16 BRPM | DIASTOLIC BLOOD PRESSURE: 97 MMHG | HEIGHT: 66 IN | WEIGHT: 130 LBS | TEMPERATURE: 98 F | HEART RATE: 80 BPM | OXYGEN SATURATION: 100 % | SYSTOLIC BLOOD PRESSURE: 146 MMHG | BODY MASS INDEX: 20.89 KG/M2

## 2021-07-22 DIAGNOSIS — R33.8 ACUTE URINARY RETENTION: Primary | ICD-10-CM

## 2021-07-22 DIAGNOSIS — R10.9 ACUTE ABDOMINAL PAIN: ICD-10-CM

## 2021-07-22 DIAGNOSIS — Z85.47 HISTORY OF TESTICULAR CANCER: ICD-10-CM

## 2021-07-22 PROCEDURE — 51702 INSERT TEMP BLADDER CATH: CPT

## 2021-07-22 PROCEDURE — 99282 EMERGENCY DEPT VISIT SF MDM: CPT

## 2021-07-22 NOTE — ED PROVIDER NOTES
[facility]    EMERGENCY DEPARTMENT ENCOUNTER      Pt Name: Stefano Claire  MRN: [id number]  YOB: 1970  Date of evaluation: 7/22/2021  Provider: Hung Ellis MD    CHIEF COMPLAINT       Chief Complaint   Patient presents with   • Difficulty Urinating         HISTORY OF PRESENT ILLNESS  (Location/Symptom, Timing/Onset, Context/Setting, Quality, Duration, Modifying Factors, Severity.)   Stefano Claire is a 50 y.o. male who presents to the emergency department with 1 day of difficulty urinating.  He describes moderate cramping suprapubic pain along with difficulty starting his urinary stream.  He has a history of testicular cancer and also enlarged prostate and notes that this is occurred multiple times in the past requiring placement of Charles catheter.  He has had no additional symptoms including no dysuria/frequency/urgency, fever, chills, vomiting, or diarrhea.  No modifying factors.      Nursing notes were reviewed.    REVIEW OF SYSTEMS    (2-9 systems for level 4, 10 or more for level 5)   ROS:  General:  No fevers, no chills, no weakness  Cardiovascular:  No chest pain, no palpitations  Respiratory:  No shortness of breath, no cough, no wheezing  Gastrointestinal: Abdominal pain  Musculoskeletal:  No muscle pain, no joint pain  Skin:  No rash  Neurologic:  No speech problems, no headache, no extremity numbness, no extremity tingling, no extremity weakness  Psychiatric:  No anxiety  Genitourinary: Urinary retention    Except as noted above the remainder of the review of systems was reviewed and negative.       PAST MEDICAL HISTORY     Past Medical History:   Diagnosis Date   • Bipolar 1 disorder (CMS/[facility])    • Cancer (CMS/[facility])     testicular   • Depression    • Diabetes mellitus (CMS/[facility])    • Hypertension          SURGICAL HISTORY       Past Surgical History:   Procedure Laterality Date   • TESTICLE SURGERY     • TESTICLE SURGERY           CURRENT MEDICATIONS     No current  facility-administered medications for this encounter.    Current Outpatient Medications:   •  QUEtiapine (SEROquel) 200 MG tablet, Take 1 tablet by mouth Every Night, Disp: 30 tablet, Rfl: 0  •  mupirocin (BACTROBAN) 2 % ointment, , Disp: , Rfl:     ALLERGIES     Patient has no known allergies.    FAMILY HISTORY     History reviewed. No pertinent family history.       SOCIAL HISTORY       Social History     Socioeconomic History   • Marital status: Single     Spouse name: Not on file   • Number of children: Not on file   • Years of education: Not on file   • Highest education level: Not on file   Tobacco Use   • Smoking status: Never Smoker   • Smokeless tobacco: Current User     Types: Chew   Substance and Sexual Activity   • Alcohol use: No   • Drug use: Yes     Types: Methamphetamines     Comment: last used 06/04/21         PHYSICAL EXAM    (up to 7 for level 4, 8 or more for level 5)     Vitals:    07/22/21 0515 07/22/21 0520 07/22/21 0530 07/22/21 0537   BP: 146/97   146/97   BP Location:    Right arm   Patient Position:    Sitting   Pulse: 80 80 79 80   Resp:    16   Temp:       TempSrc:       SpO2: 100% 100% 100% 100%   Weight:       Height:           Physical Exam  General: Awake, alert, no acute distress.  HEENT: Conjunctivae normal.  Neck: Trachea midline.  Cardiac: Heart regular rate, rhythm, no murmurs, rubs, or gallops  Lungs: Lungs are clear to auscultation, there is no wheezing, rhonchi, or rales. There is no use of accessory muscles.  Chest wall: There is no tenderness to palpation over the chest wall or over ribs  Abdomen: Moderate suprapubic tenderness without any associated distention, rebound, or guarding  Musculoskeletal: No deformity.  Neuro: Alert and oriented x 4.  Dermatology: Skin is warm and dry  Psych: Mentation is grossly normal, cognition is grossly normal. Affect is appropriate.      EMERGENCY DEPARTMENT COURSE and DIFFERENTIAL DIAGNOSIS/MDM:   Vitals:    Vitals:    07/22/21 0515  07/22/21 0520 07/22/21 0530 07/22/21 0537   BP: 146/97   146/97   BP Location:    Right arm   Patient Position:    Sitting   Pulse: 80 80 79 80   Resp:    16   Temp:       TempSrc:       SpO2: 100% 100% 100% 100%   Weight:       Height:           ED Course as of Jul 22 0552   Thu Jul 22, 2021   0446 Patient symptoms completely resolved after application of Charles catheter.  This is consistent with acute urinary retention likely secondary to his history of pelvic radiation and possibly underlying enlarged prostate.  He has no complaints of back pain, radicular symptoms, saddle anesthesia, or other complaints or findings that would suggest a spinal cause of his symptoms.  He has no infectious symptoms as well as no fever.  He has no residual tenderness on abdominal examination to suggest alternate process.  Appropriate for discharge home with close outpatient follow-up with urology.    [NS]   0450 Patient is declining to leave with Charles catheter at this time.  Requested to be removed.  He understands potential risks of doing this.    [NS]      ED Course User Index  [NS] Hung Ellis MD         I had a discussion with the patient/family regarding diagnosis, diagnostic results, treatment plan, and medications.  The patient/family indicated understanding of these instructions.  I spent adequate time at the bedside preceding discharge necessary to personally discuss the aftercare instructions, giving patient education, providing explanations of the results of our evaluations/findings, and my decision making to assure that the patient/family understand the plan of care.  Time was allotted to answer questions at that time and throughout the ED course.  Emphasis was placed on timely follow-up after discharge.  I also discussed the potential for the development of an acute emergent condition requiring further evaluation, admission, or even surgical intervention. I discussed that we found nothing during the visit today  indicating the need for further workup, admission, or the presence of an unstable medical condition.  I encouraged the patient to return to the emergency department immediately for ANY concerns, worsening, new complaints, or if symptoms persist and unable to seek follow-up in a timely fashion.  The patient/family expressed understanding and agreement with this plan.  The patient will follow-up with their PCP in 1-2 days for reevaluation.         FINAL IMPRESSION      1. Acute urinary retention    2. Acute abdominal pain    3. History of testicular cancer          DISPOSITION/PLAN     ED Disposition     ED Disposition Condition Comment    Discharge Stable           PATIENT REFERRED TO:  Zaira Dubois, APRN  [address]  [facility] [id number] [phone]    Schedule an appointment as soon as possible for a visit in 1 day      [facility] Emergency Department  [address] [place] [address] [phone]    If symptoms worsen    Avery Eagle MD  [address] [address]  [facility] [id number] [phone]    Schedule an appointment as soon as possible for a visit in 1 day        DISCHARGE MEDICATIONS:     Medication List      CONTINUE taking these medications    mupirocin 2 % ointment  Commonly known as: BACTROBAN     QUEtiapine 200 MG tablet  Commonly known as: SEROquel  Take 1 tablet by mouth Every Night                Comment: Please note this report has been produced using speech recognition software.      Hung Ellis MD  Attending Emergency Physician               Hung Ellis MD  07/22/21 3448

## 2022-02-03 ENCOUNTER — OFFICE VISIT (OUTPATIENT)
Dept: FAMILY MEDICINE CLINIC | Facility: CLINIC | Age: 52
End: 2022-02-03

## 2022-02-03 ENCOUNTER — TELEPHONE (OUTPATIENT)
Dept: FAMILY MEDICINE CLINIC | Facility: CLINIC | Age: 52
End: 2022-02-03

## 2022-02-03 ENCOUNTER — LAB (OUTPATIENT)
Dept: LAB | Facility: HOSPITAL | Age: 52
End: 2022-02-03

## 2022-02-03 VITALS
BODY MASS INDEX: 21.38 KG/M2 | TEMPERATURE: 98.7 F | DIASTOLIC BLOOD PRESSURE: 102 MMHG | HEIGHT: 66 IN | HEART RATE: 132 BPM | RESPIRATION RATE: 20 BRPM | OXYGEN SATURATION: 99 % | WEIGHT: 133 LBS | SYSTOLIC BLOOD PRESSURE: 160 MMHG

## 2022-02-03 DIAGNOSIS — D75.839 THROMBOCYTOSIS: Primary | ICD-10-CM

## 2022-02-03 DIAGNOSIS — I15.9 SECONDARY HYPERTENSION: ICD-10-CM

## 2022-02-03 DIAGNOSIS — I10 ESSENTIAL HYPERTENSION: ICD-10-CM

## 2022-02-03 DIAGNOSIS — Z00.00 ANNUAL PHYSICAL EXAM: Primary | ICD-10-CM

## 2022-02-03 DIAGNOSIS — R32 URINARY INCONTINENCE, UNSPECIFIED TYPE: ICD-10-CM

## 2022-02-03 DIAGNOSIS — F15.20 METHAMPHETAMINE USE DISORDER, SEVERE: ICD-10-CM

## 2022-02-03 DIAGNOSIS — R33.9 URINARY RETENTION: ICD-10-CM

## 2022-02-03 DIAGNOSIS — F17.220 CHEWING TOBACCO NICOTINE DEPENDENCE WITHOUT COMPLICATION: ICD-10-CM

## 2022-02-03 DIAGNOSIS — Z12.11 SCREENING FOR COLON CANCER: ICD-10-CM

## 2022-02-03 LAB
ALBUMIN SERPL-MCNC: 4.5 G/DL (ref 3.5–5.2)
ALBUMIN/GLOB SERPL: 1.5 G/DL
ALP SERPL-CCNC: 112 U/L (ref 39–117)
ALT SERPL W P-5'-P-CCNC: 15 U/L (ref 1–41)
ANION GAP SERPL CALCULATED.3IONS-SCNC: 14 MMOL/L (ref 5–15)
AST SERPL-CCNC: 17 U/L (ref 1–40)
BILIRUB SERPL-MCNC: 0.2 MG/DL (ref 0–1.2)
BUN SERPL-MCNC: 13 MG/DL (ref 6–20)
BUN/CREAT SERPL: 11.8 (ref 7–25)
CALCIUM SPEC-SCNC: 9.6 MG/DL (ref 8.6–10.5)
CHLORIDE SERPL-SCNC: 99 MMOL/L (ref 98–107)
CHOLEST SERPL-MCNC: 192 MG/DL (ref 0–200)
CO2 SERPL-SCNC: 23 MMOL/L (ref 22–29)
CREAT SERPL-MCNC: 1.1 MG/DL (ref 0.76–1.27)
DEPRECATED RDW RBC AUTO: 43.7 FL (ref 37–54)
ERYTHROCYTE [DISTWIDTH] IN BLOOD BY AUTOMATED COUNT: 13.6 % (ref 12.3–15.4)
GFR SERPL CREATININE-BSD FRML MDRD: 71 ML/MIN/1.73
GLOBULIN UR ELPH-MCNC: 3 GM/DL
GLUCOSE SERPL-MCNC: 108 MG/DL (ref 65–99)
HBA1C MFR BLD: 5.6 % (ref 4.8–5.6)
HCT VFR BLD AUTO: 44.3 % (ref 37.5–51)
HDLC SERPL-MCNC: 63 MG/DL (ref 40–60)
HGB BLD-MCNC: 14.7 G/DL (ref 13–17.7)
LDLC SERPL CALC-MCNC: 121 MG/DL (ref 0–100)
LDLC/HDLC SERPL: 1.91 {RATIO}
MCH RBC QN AUTO: 29.6 PG (ref 26.6–33)
MCHC RBC AUTO-ENTMCNC: 33.2 G/DL (ref 31.5–35.7)
MCV RBC AUTO: 89.3 FL (ref 79–97)
PLATELET # BLD AUTO: 481 10*3/MM3 (ref 140–450)
PMV BLD AUTO: 8.9 FL (ref 6–12)
POTASSIUM SERPL-SCNC: 4 MMOL/L (ref 3.5–5.2)
PROT SERPL-MCNC: 7.5 G/DL (ref 6–8.5)
RBC # BLD AUTO: 4.96 10*6/MM3 (ref 4.14–5.8)
SODIUM SERPL-SCNC: 136 MMOL/L (ref 136–145)
TRIGL SERPL-MCNC: 42 MG/DL (ref 0–150)
TSH SERPL DL<=0.05 MIU/L-ACNC: 2.09 UIU/ML (ref 0.27–4.2)
VLDLC SERPL-MCNC: 8 MG/DL (ref 5–40)
WBC NRBC COR # BLD: 9.97 10*3/MM3 (ref 3.4–10.8)

## 2022-02-03 PROCEDURE — 85027 COMPLETE CBC AUTOMATED: CPT

## 2022-02-03 PROCEDURE — 83036 HEMOGLOBIN GLYCOSYLATED A1C: CPT

## 2022-02-03 PROCEDURE — 3008F BODY MASS INDEX DOCD: CPT | Performed by: STUDENT IN AN ORGANIZED HEALTH CARE EDUCATION/TRAINING PROGRAM

## 2022-02-03 PROCEDURE — 90686 IIV4 VACC NO PRSV 0.5 ML IM: CPT | Performed by: STUDENT IN AN ORGANIZED HEALTH CARE EDUCATION/TRAINING PROGRAM

## 2022-02-03 PROCEDURE — 99396 PREV VISIT EST AGE 40-64: CPT | Performed by: STUDENT IN AN ORGANIZED HEALTH CARE EDUCATION/TRAINING PROGRAM

## 2022-02-03 PROCEDURE — 2014F MENTAL STATUS ASSESS: CPT | Performed by: STUDENT IN AN ORGANIZED HEALTH CARE EDUCATION/TRAINING PROGRAM

## 2022-02-03 PROCEDURE — 90471 IMMUNIZATION ADMIN: CPT | Performed by: STUDENT IN AN ORGANIZED HEALTH CARE EDUCATION/TRAINING PROGRAM

## 2022-02-03 PROCEDURE — 80061 LIPID PANEL: CPT

## 2022-02-03 PROCEDURE — 85060 BLOOD SMEAR INTERPRETATION: CPT | Performed by: STUDENT IN AN ORGANIZED HEALTH CARE EDUCATION/TRAINING PROGRAM

## 2022-02-03 PROCEDURE — 84443 ASSAY THYROID STIM HORMONE: CPT

## 2022-02-03 PROCEDURE — 80053 COMPREHEN METABOLIC PANEL: CPT

## 2022-02-03 RX ORDER — AMLODIPINE BESYLATE 5 MG/1
5 TABLET ORAL DAILY
Qty: 30 TABLET | Refills: 2 | Status: SHIPPED | OUTPATIENT
Start: 2022-02-03 | End: 2022-05-17 | Stop reason: SDUPTHER

## 2022-02-03 NOTE — PROGRESS NOTES
New Patient Office Visit      Patient Name: Stefano Claire  : 1970   MRN: [id number]     Chief Complaint:  Annual Exam (new patient physcial. est care) and Referral - Intestine/SB Txp (colonoscopy)     History of Present Illness:   He has a history of homelessness but currently is living in a home.     History of testicular cancer saw urology in . Had orchiectomy in . Has a history of urinary retention and has done intermittent self cathing inthe past. His urologist wanted him to followu p with  urology.  Testicular US was normal. He says this is the only cancer he has ever had. He say he is peeing okay.     history of methamphetamine positive in the past.  Says occasionally he uses meth and chews tobacco.     He also needs referral to gi for colonoscopy. Has a family history of colon cancer she was 67 at diagnosis.     Has a history of bipolar says he is taking seroquel and bp medication but he doesn't know how many or the names of any of them. Sees a psychiatrist but unsure who believes at [facility]. Per his friend in the room he has been admitted and discharged on multiple occasions to medical psychiatric facilities.     He denies a history of DM.     Annual exam:  Had covid vaccine  Will take flu shot  Refer for colo  urologyg for psa management referred      Subjective        Past Medical History:   Diagnosis Date   • Bipolar 1 disorder (HCC)    • Cancer (HCC)     testicular   • Depression    • Diabetes mellitus (HCC)    • Hypertension        Past Surgical History:   Procedure Laterality Date   • TESTICLE SURGERY     • TESTICLE SURGERY         History reviewed. No pertinent family history.    Social History     Socioeconomic History   • Marital status: Single   Tobacco Use   • Smoking status: Never Smoker   • Smokeless tobacco: Current User     Types: Chew   Substance and Sexual Activity   • Alcohol use: No   • Drug use: Yes     Types: Methamphetamines      "Comment: last used 06/04/21        No current outpatient medications on file.    No Known Allergies    Objective     Physical Exam:  Vitals:    02/03/22 0829   BP: (!) 160/102   Pulse: (!) 132   Resp: 20   Temp: 98.7 °F (37.1 °C)   SpO2: 99%   Weight: 60.3 kg (133 lb)   Height: 167.6 cm (66\")      Body mass index is 21.47 kg/m².     Physical Exam  Constitutional:       General: He is not in acute distress.     Appearance: Normal appearance.   HENT:      Head: Normocephalic and atraumatic.   Eyes:      Extraocular Movements: Extraocular movements intact.   Cardiovascular:      Rate and Rhythm: Normal rate and regular rhythm.      Heart sounds: No murmur heard.      Pulmonary:      Effort: Pulmonary effort is normal. No respiratory distress.      Breath sounds: Normal breath sounds.   Abdominal:      General: Abdomen is flat. There is no distension.      Palpations: Abdomen is soft. There is no mass.      Tenderness: There is no abdominal tenderness. There is no guarding or rebound.   Musculoskeletal:         General: No swelling.      Cervical back: Normal range of motion.   Skin:     Findings: No rash.   Neurological:      Mental Status: He is alert.   Psychiatric:         Mood and Affect: Mood normal.      Comments: He is calm and pleasant. Very mild occasional generalized tremor. He answers questions appropriately and appears to have good cognition. Gives a good medical history aside from his medications              Assessment / Plan      Assessment/Plan:   Diagnoses and all orders for this visit:    1. Annual physical exam (Primary)  Assessment & Plan:  Annual exam:  Had covid vaccines  Will take flu shot today  Refer for colo  urologyg for psa management referred  Counseled to avoid methamphetamine and tobacco as these can lead to cancer and heart problems. He understands. Says he is honest but not ready to quit.       2. Methamphetamine use disorder, severe (HCC)  Assessment & Plan:  Given he says his last use " was two days ago with tachycardia and hypertension I have advised him to go to the ER. He declines but says if he has any more symptoms he will go. His friend Tomer is in the room for part of his visit and says he helps him with driving to and from appointments.   Referred to behavioral health specifically addiction medicine.     Orders:  -     CBC (No Diff); Future  -     Comprehensive Metabolic Panel; Future  -     Ambulatory Referral to Behavioral Health    3. Chewing tobacco nicotine dependence without complication  Assessment & Plan:  Counseled on cessation      4. Screening for colon cancer  -     Ambulatory Referral to Gastroenterology    5. Urinary incontinence, unspecified type  -     Ambulatory Referral to Urology    6. Secondary hypertension  -     Lipid Panel; Future  -     Hemoglobin A1c; Future  -     TSH Rfx On Abnormal To Free T4; Future    7. Essential hypertension  Assessment & Plan:  Says he will call us when he gets home to let us know what medications he takes for his bp and for bipolar.       8. Urinary retention  Assessment & Plan:  With incontinence. Referred to  and given him written instructions. He is established with them.      Other orders  -     FluLaval/Fluarix/Fluzone >6 Months       Return in about 1 month (around 3/3/2022).       Esther Ellis D.O.  [facility] [place]

## 2022-02-03 NOTE — TELEPHONE ENCOUNTER
Please let him know I have called in the amlodipine. The rest will need to be refilled by his psychiatrist.

## 2022-02-03 NOTE — ASSESSMENT & PLAN NOTE
Annual exam:  Had covid vaccines  Will take flu shot today  Refer for colo  urologyg for psa management referred  Counseled to avoid methamphetamine and tobacco as these can lead to cancer and heart problems. He understands. Says he is honest but not ready to quit.

## 2022-02-03 NOTE — ASSESSMENT & PLAN NOTE
Given he says his last use was two days ago with tachycardia and hypertension I have advised him to go to the ER. He declines but says if he has any more symptoms he will go. His friend Tomer is in the room for part of his visit and says he helps him with driving to and from appointments.   Referred to behavioral health specifically addiction medicine.

## 2022-02-03 NOTE — TELEPHONE ENCOUNTER
Caller: TODD RIDER    Relationship: NEIGHBOR    Best call back number: [phone]     What is the best time to reach you: ANY    Who are you requesting to speak with (clinical staff, provider,  specific staff member): CLINICAL      What was the call regarding: PRESCRIPTIONS PATIENT IS TAKING   AMLODIPINE 5MG  OLANZAPINE 10 MG   BUPROPION  MG  TRAZODONE 50 MG     PATIENT REQUESTING REFILL ON THE TRAZODONE FOR INSOMNIA. HE STATES HE IS HAVING ISSUES TRYING TO GET THIS REFILLED OTHERWISE. CALLER WOULD LIKE A CALLBACK AS WELL WITH ADVICE ON PATIENTS CARE.    ROXANNE [address] [place] [address] [phone] [facility] [phone]   [phone]    Do you require a callback: YES

## 2022-02-03 NOTE — ASSESSMENT & PLAN NOTE
Says he will call us when he gets home to let us know what medications he takes for his bp and for bipolar.

## 2022-02-04 ENCOUNTER — PATIENT ROUNDING (BHMG ONLY) (OUTPATIENT)
Dept: FAMILY MEDICINE CLINIC | Facility: CLINIC | Age: 52
End: 2022-02-04

## 2022-02-04 LAB
CYTOLOGIST CVX/VAG CYTO: NORMAL
PATH INTERP BLD-IMP: NORMAL

## 2022-02-11 ENCOUNTER — OFFICE VISIT (OUTPATIENT)
Dept: GASTROENTEROLOGY | Facility: CLINIC | Age: 52
End: 2022-02-11

## 2022-02-11 VITALS
HEART RATE: 84 BPM | WEIGHT: 135.8 LBS | HEIGHT: 66 IN | TEMPERATURE: 98.2 F | DIASTOLIC BLOOD PRESSURE: 83 MMHG | SYSTOLIC BLOOD PRESSURE: 154 MMHG | BODY MASS INDEX: 21.83 KG/M2

## 2022-02-11 DIAGNOSIS — K59.04 CHRONIC IDIOPATHIC CONSTIPATION: ICD-10-CM

## 2022-02-11 DIAGNOSIS — Z12.11 SCREEN FOR COLON CANCER: ICD-10-CM

## 2022-02-11 DIAGNOSIS — K62.5 BLOOD PER RECTUM: Primary | ICD-10-CM

## 2022-02-11 PROCEDURE — 99214 OFFICE O/P EST MOD 30 MIN: CPT | Performed by: NURSE PRACTITIONER

## 2022-02-11 RX ORDER — LACTULOSE 10 G/15ML
20 SOLUTION ORAL 2 TIMES DAILY PRN
Qty: 5400 ML | Refills: 3 | Status: SHIPPED | OUTPATIENT
Start: 2022-02-11 | End: 2022-03-14 | Stop reason: SDUPTHER

## 2022-02-11 RX ORDER — TRAZODONE HYDROCHLORIDE 50 MG/1
100 TABLET ORAL DAILY
COMMUNITY
Start: 2022-02-03 | End: 2022-12-21

## 2022-02-11 RX ORDER — BUPROPION HYDROCHLORIDE 150 MG/1
150 TABLET ORAL DAILY
COMMUNITY
Start: 2022-01-27

## 2022-02-11 RX ORDER — OLANZAPINE 15 MG/1
15 TABLET ORAL DAILY
COMMUNITY
Start: 2022-02-07

## 2022-03-14 ENCOUNTER — LAB (OUTPATIENT)
Dept: LAB | Facility: HOSPITAL | Age: 52
End: 2022-03-14

## 2022-03-14 ENCOUNTER — OFFICE VISIT (OUTPATIENT)
Dept: FAMILY MEDICINE CLINIC | Facility: CLINIC | Age: 52
End: 2022-03-14

## 2022-03-14 VITALS
TEMPERATURE: 98.6 F | SYSTOLIC BLOOD PRESSURE: 122 MMHG | DIASTOLIC BLOOD PRESSURE: 78 MMHG | HEIGHT: 66 IN | HEART RATE: 82 BPM | BODY MASS INDEX: 21.34 KG/M2 | OXYGEN SATURATION: 98 % | WEIGHT: 132.8 LBS | RESPIRATION RATE: 16 BRPM

## 2022-03-14 DIAGNOSIS — D75.839 THROMBOCYTOSIS: ICD-10-CM

## 2022-03-14 DIAGNOSIS — K59.04 CHRONIC IDIOPATHIC CONSTIPATION: ICD-10-CM

## 2022-03-14 DIAGNOSIS — D75.839 THROMBOCYTOSIS: Primary | ICD-10-CM

## 2022-03-14 PROCEDURE — 85049 AUTOMATED PLATELET COUNT: CPT

## 2022-03-14 PROCEDURE — 82728 ASSAY OF FERRITIN: CPT

## 2022-03-14 PROCEDURE — 99214 OFFICE O/P EST MOD 30 MIN: CPT | Performed by: STUDENT IN AN ORGANIZED HEALTH CARE EDUCATION/TRAINING PROGRAM

## 2022-03-14 PROCEDURE — 36415 COLL VENOUS BLD VENIPUNCTURE: CPT

## 2022-03-14 RX ORDER — LACTULOSE 10 G/15ML
20 SOLUTION ORAL 2 TIMES DAILY PRN
Qty: 5400 ML | Refills: 3 | Status: SHIPPED | OUTPATIENT
Start: 2022-03-14

## 2022-03-14 RX ORDER — PEG-3350, SODIUM SULFATE, SODIUM CHLORIDE, POTASSIUM CHLORIDE, SODIUM ASCORBATE AND ASCORBIC ACID 7.5-2.691G
KIT ORAL
Qty: 1 EACH | Refills: 0 | Status: SHIPPED | OUTPATIENT
Start: 2022-03-14 | End: 2022-06-16

## 2022-03-14 NOTE — PROGRESS NOTES
"Chief Complaint  Hypertension (1 mnth f/u)    History of Present Illness     htn  Tolerating amlodipine. Does not check at home.     Says he has fu with psychiatrist on the 23rd.     He says lactulose from his gi was sent to wrong pharmacy.       The following portions of the patient's history were reviewed and updated as appropriate: allergies, current medications, past family history, past medical history, past social history, past surgical history, and problem list.    OBJECTIVE:  /78   Pulse 82   Temp 98.6 °F (37 °C)   Resp 16   Ht 167.6 cm (66\")   Wt 60.2 kg (132 lb 12.8 oz)   SpO2 98%   BMI 21.43 kg/m²       Physical Exam  Constitutional:       General: He is not in acute distress.     Appearance: Normal appearance.   HENT:      Head: Normocephalic and atraumatic.   Eyes:      Extraocular Movements: Extraocular movements intact.   Cardiovascular:      Rate and Rhythm: Normal rate and regular rhythm.      Heart sounds: No murmur heard.  Pulmonary:      Effort: Pulmonary effort is normal. No respiratory distress.      Breath sounds: Normal breath sounds.   Abdominal:      General: Abdomen is flat.   Musculoskeletal:         General: No swelling.      Cervical back: Normal range of motion.   Skin:     Findings: No rash.   Neurological:      General: No focal deficit present.      Mental Status: He is alert.   Psychiatric:         Mood and Affect: Mood normal.                    Assessment and Plan   Diagnoses and all orders for this visit:    1. Thrombocytosis (Primary)  -     Platelet Count; Future  -     Ferritin; Future    2. Chronic idiopathic constipation  -     lactulose (CHRONULAC) 10 GM/15ML solution; Take 30 mL by mouth 2 (Two) Times a Day As Needed (constipation).  Dispense: 5400 mL; Refill: 3          Return in about 3 months (around 6/14/2022).       Esther Ellis D.O.  [facility] [place]     "

## 2022-03-15 LAB
FERRITIN SERPL-MCNC: 30.1 NG/ML (ref 30–400)
PLATELET # BLD AUTO: 413 10*3/MM3 (ref 140–450)

## 2022-03-24 ENCOUNTER — OUTSIDE FACILITY SERVICE (OUTPATIENT)
Dept: GASTROENTEROLOGY | Facility: CLINIC | Age: 52
End: 2022-03-24

## 2022-03-24 PROCEDURE — 45385 COLONOSCOPY W/LESION REMOVAL: CPT | Performed by: INTERNAL MEDICINE

## 2022-05-10 ENCOUNTER — TELEPHONE (OUTPATIENT)
Dept: GASTROENTEROLOGY | Facility: CLINIC | Age: 52
End: 2022-05-10

## 2022-05-10 NOTE — TELEPHONE ENCOUNTER
CALLED PATIENT TO SCHEDULE A REPEAT COLONOSCOPY IN September BUT, IT WENT STRAIGHT TO VOICEMAIL. I LEFT A MESSAGE FOR HIM TO CALL US BACK TO SCHEDULE.

## 2022-05-11 ENCOUNTER — APPOINTMENT (OUTPATIENT)
Dept: GENERAL RADIOLOGY | Facility: HOSPITAL | Age: 52
End: 2022-05-11

## 2022-05-11 ENCOUNTER — HOSPITAL ENCOUNTER (INPATIENT)
Facility: HOSPITAL | Age: 52
LOS: 1 days | Discharge: LEFT BEFORE DISCHARGE | End: 2022-05-11
Attending: EMERGENCY MEDICINE | Admitting: FAMILY MEDICINE

## 2022-05-11 ENCOUNTER — TELEPHONE (OUTPATIENT)
Dept: FAMILY MEDICINE CLINIC | Facility: CLINIC | Age: 52
End: 2022-05-11

## 2022-05-11 VITALS
WEIGHT: 123.9 LBS | OXYGEN SATURATION: 98 % | HEIGHT: 66 IN | DIASTOLIC BLOOD PRESSURE: 86 MMHG | HEART RATE: 94 BPM | SYSTOLIC BLOOD PRESSURE: 118 MMHG | TEMPERATURE: 97.9 F | RESPIRATION RATE: 16 BRPM | BODY MASS INDEX: 19.91 KG/M2

## 2022-05-11 DIAGNOSIS — R33.8 ACUTE URINARY RETENTION: Primary | ICD-10-CM

## 2022-05-11 DIAGNOSIS — F15.10 METHAMPHETAMINE USE: ICD-10-CM

## 2022-05-11 DIAGNOSIS — N17.9 ACUTE KIDNEY INJURY: ICD-10-CM

## 2022-05-11 PROBLEM — Z72.0 TOBACCO ABUSE: Status: ACTIVE | Noted: 2022-05-11

## 2022-05-11 PROBLEM — Z72.0 CHEWING TOBACCO USE: Status: ACTIVE | Noted: 2022-05-11

## 2022-05-11 PROBLEM — Z91.14 H/O MEDICATION NONCOMPLIANCE: Status: ACTIVE | Noted: 2022-05-11

## 2022-05-11 PROBLEM — Z85.47 HISTORY OF TESTICULAR CANCER: Status: ACTIVE | Noted: 2022-05-11

## 2022-05-11 PROBLEM — F32.A DEPRESSION: Status: ACTIVE | Noted: 2022-05-11

## 2022-05-11 PROBLEM — Z91.148 H/O MEDICATION NONCOMPLIANCE: Status: ACTIVE | Noted: 2022-05-11

## 2022-05-11 PROBLEM — F19.10 SUBSTANCE ABUSE (HCC): Status: ACTIVE | Noted: 2022-05-11

## 2022-05-11 LAB
ALBUMIN SERPL-MCNC: 4.6 G/DL (ref 3.5–5.2)
ALBUMIN/GLOB SERPL: 1.4 G/DL
ALP SERPL-CCNC: 116 U/L (ref 39–117)
ALT SERPL W P-5'-P-CCNC: 24 U/L (ref 1–41)
AMPHET+METHAMPHET UR QL: POSITIVE
AMPHETAMINES UR QL: POSITIVE
ANION GAP SERPL CALCULATED.3IONS-SCNC: 10 MMOL/L (ref 5–15)
ANION GAP SERPL CALCULATED.3IONS-SCNC: 17 MMOL/L (ref 5–15)
AST SERPL-CCNC: 54 U/L (ref 1–40)
BARBITURATES UR QL SCN: NEGATIVE
BASOPHILS # BLD AUTO: 0.07 10*3/MM3 (ref 0–0.2)
BASOPHILS # BLD AUTO: 0.08 10*3/MM3 (ref 0–0.2)
BASOPHILS NFR BLD AUTO: 0.9 % (ref 0–1.5)
BASOPHILS NFR BLD AUTO: 0.9 % (ref 0–1.5)
BENZODIAZ UR QL SCN: NEGATIVE
BILIRUB SERPL-MCNC: 0.3 MG/DL (ref 0–1.2)
BILIRUB UR QL STRIP: NEGATIVE
BUN SERPL-MCNC: 37 MG/DL (ref 6–20)
BUN SERPL-MCNC: 38 MG/DL (ref 6–20)
BUN/CREAT SERPL: 21.5 (ref 7–25)
BUN/CREAT SERPL: 32.2 (ref 7–25)
BUPRENORPHINE SERPL-MCNC: NEGATIVE NG/ML
CALCIUM SPEC-SCNC: 10 MG/DL (ref 8.6–10.5)
CALCIUM SPEC-SCNC: 8.4 MG/DL (ref 8.6–10.5)
CANNABINOIDS SERPL QL: NEGATIVE
CHLORIDE SERPL-SCNC: 105 MMOL/L (ref 98–107)
CHLORIDE SERPL-SCNC: 98 MMOL/L (ref 98–107)
CLARITY UR: CLEAR
CO2 SERPL-SCNC: 25 MMOL/L (ref 22–29)
CO2 SERPL-SCNC: 25 MMOL/L (ref 22–29)
COCAINE UR QL: NEGATIVE
COLOR UR: YELLOW
CREAT SERPL-MCNC: 1.15 MG/DL (ref 0.76–1.27)
CREAT SERPL-MCNC: 1.77 MG/DL (ref 0.76–1.27)
DEPRECATED RDW RBC AUTO: 40.2 FL (ref 37–54)
DEPRECATED RDW RBC AUTO: 41.5 FL (ref 37–54)
EGFRCR SERPLBLD CKD-EPI 2021: 46.5 ML/MIN/1.73
EGFRCR SERPLBLD CKD-EPI 2021: 78 ML/MIN/1.73
EOSINOPHIL # BLD AUTO: 0.14 10*3/MM3 (ref 0–0.4)
EOSINOPHIL # BLD AUTO: 0.21 10*3/MM3 (ref 0–0.4)
EOSINOPHIL NFR BLD AUTO: 1.7 % (ref 0.3–6.2)
EOSINOPHIL NFR BLD AUTO: 2.8 % (ref 0.3–6.2)
ERYTHROCYTE [DISTWIDTH] IN BLOOD BY AUTOMATED COUNT: 13.2 % (ref 12.3–15.4)
ERYTHROCYTE [DISTWIDTH] IN BLOOD BY AUTOMATED COUNT: 13.2 % (ref 12.3–15.4)
GLOBULIN UR ELPH-MCNC: 3.2 GM/DL
GLUCOSE SERPL-MCNC: 112 MG/DL (ref 65–99)
GLUCOSE SERPL-MCNC: 86 MG/DL (ref 65–99)
GLUCOSE UR STRIP-MCNC: ABNORMAL MG/DL
HBA1C MFR BLD: 5.5 % (ref 4.8–5.6)
HCT VFR BLD AUTO: 39.9 % (ref 37.5–51)
HCT VFR BLD AUTO: 44.1 % (ref 37.5–51)
HGB BLD-MCNC: 13.3 G/DL (ref 13–17.7)
HGB BLD-MCNC: 15.1 G/DL (ref 13–17.7)
HGB UR QL STRIP.AUTO: NEGATIVE
IMM GRANULOCYTES # BLD AUTO: 0.02 10*3/MM3 (ref 0–0.05)
IMM GRANULOCYTES # BLD AUTO: 0.05 10*3/MM3 (ref 0–0.05)
IMM GRANULOCYTES NFR BLD AUTO: 0.2 % (ref 0–0.5)
IMM GRANULOCYTES NFR BLD AUTO: 0.7 % (ref 0–0.5)
KETONES UR QL STRIP: NEGATIVE
LEUKOCYTE ESTERASE UR QL STRIP.AUTO: NEGATIVE
LYMPHOCYTES # BLD AUTO: 1.89 10*3/MM3 (ref 0.7–3.1)
LYMPHOCYTES # BLD AUTO: 1.93 10*3/MM3 (ref 0.7–3.1)
LYMPHOCYTES NFR BLD AUTO: 22.8 % (ref 19.6–45.3)
LYMPHOCYTES NFR BLD AUTO: 25.6 % (ref 19.6–45.3)
MAGNESIUM SERPL-MCNC: 2.1 MG/DL (ref 1.6–2.6)
MCH RBC QN AUTO: 28.7 PG (ref 26.6–33)
MCH RBC QN AUTO: 29.1 PG (ref 26.6–33)
MCHC RBC AUTO-ENTMCNC: 33.3 G/DL (ref 31.5–35.7)
MCHC RBC AUTO-ENTMCNC: 34.2 G/DL (ref 31.5–35.7)
MCV RBC AUTO: 83.8 FL (ref 79–97)
MCV RBC AUTO: 87.3 FL (ref 79–97)
METHADONE UR QL SCN: NEGATIVE
MONOCYTES # BLD AUTO: 0.68 10*3/MM3 (ref 0.1–0.9)
MONOCYTES # BLD AUTO: 0.99 10*3/MM3 (ref 0.1–0.9)
MONOCYTES NFR BLD AUTO: 11.7 % (ref 5–12)
MONOCYTES NFR BLD AUTO: 9.2 % (ref 5–12)
NEUTROPHILS NFR BLD AUTO: 4.48 10*3/MM3 (ref 1.7–7)
NEUTROPHILS NFR BLD AUTO: 5.32 10*3/MM3 (ref 1.7–7)
NEUTROPHILS NFR BLD AUTO: 60.8 % (ref 42.7–76)
NEUTROPHILS NFR BLD AUTO: 62.7 % (ref 42.7–76)
NITRITE UR QL STRIP: NEGATIVE
NRBC BLD AUTO-RTO: 0 /100 WBC (ref 0–0.2)
NRBC BLD AUTO-RTO: 0 /100 WBC (ref 0–0.2)
OPIATES UR QL: NEGATIVE
OXYCODONE UR QL SCN: NEGATIVE
PCP UR QL SCN: NEGATIVE
PH UR STRIP.AUTO: 5.5 [PH] (ref 5–8)
PLATELET # BLD AUTO: 362 10*3/MM3 (ref 140–450)
PLATELET # BLD AUTO: 466 10*3/MM3 (ref 140–450)
PMV BLD AUTO: 8.4 FL (ref 6–12)
PMV BLD AUTO: 8.5 FL (ref 6–12)
POTASSIUM SERPL-SCNC: 3.7 MMOL/L (ref 3.5–5.2)
POTASSIUM SERPL-SCNC: 4.2 MMOL/L (ref 3.5–5.2)
PROPOXYPH UR QL: NEGATIVE
PROT SERPL-MCNC: 7.8 G/DL (ref 6–8.5)
PROT UR QL STRIP: ABNORMAL
RBC # BLD AUTO: 4.57 10*6/MM3 (ref 4.14–5.8)
RBC # BLD AUTO: 5.26 10*6/MM3 (ref 4.14–5.8)
SARS-COV-2 RDRP RESP QL NAA+PROBE: NORMAL
SODIUM SERPL-SCNC: 140 MMOL/L (ref 136–145)
SODIUM SERPL-SCNC: 140 MMOL/L (ref 136–145)
SP GR UR STRIP: 1.02 (ref 1–1.03)
TRICYCLICS UR QL SCN: NEGATIVE
UROBILINOGEN UR QL STRIP: ABNORMAL
WBC NRBC COR # BLD: 7.38 10*3/MM3 (ref 3.4–10.8)
WBC NRBC COR # BLD: 8.48 10*3/MM3 (ref 3.4–10.8)

## 2022-05-11 PROCEDURE — 25010000002 HEPARIN (PORCINE) PER 1000 UNITS: Performed by: NURSE PRACTITIONER

## 2022-05-11 PROCEDURE — 83735 ASSAY OF MAGNESIUM: CPT | Performed by: NURSE PRACTITIONER

## 2022-05-11 PROCEDURE — 83036 HEMOGLOBIN GLYCOSYLATED A1C: CPT | Performed by: NURSE PRACTITIONER

## 2022-05-11 PROCEDURE — 99284 EMERGENCY DEPT VISIT MOD MDM: CPT

## 2022-05-11 PROCEDURE — 71045 X-RAY EXAM CHEST 1 VIEW: CPT

## 2022-05-11 PROCEDURE — 99222 1ST HOSP IP/OBS MODERATE 55: CPT | Performed by: NURSE PRACTITIONER

## 2022-05-11 PROCEDURE — 85025 COMPLETE CBC W/AUTO DIFF WBC: CPT | Performed by: NURSE PRACTITIONER

## 2022-05-11 PROCEDURE — 80053 COMPREHEN METABOLIC PANEL: CPT | Performed by: PHYSICIAN ASSISTANT

## 2022-05-11 PROCEDURE — 93010 ELECTROCARDIOGRAM REPORT: CPT | Performed by: INTERNAL MEDICINE

## 2022-05-11 PROCEDURE — 80306 DRUG TEST PRSMV INSTRMNT: CPT | Performed by: PHYSICIAN ASSISTANT

## 2022-05-11 PROCEDURE — 85025 COMPLETE CBC W/AUTO DIFF WBC: CPT | Performed by: PHYSICIAN ASSISTANT

## 2022-05-11 PROCEDURE — 81003 URINALYSIS AUTO W/O SCOPE: CPT | Performed by: PHYSICIAN ASSISTANT

## 2022-05-11 PROCEDURE — 87635 SARS-COV-2 COVID-19 AMP PRB: CPT | Performed by: INTERNAL MEDICINE

## 2022-05-11 PROCEDURE — 93005 ELECTROCARDIOGRAM TRACING: CPT | Performed by: NURSE PRACTITIONER

## 2022-05-11 PROCEDURE — 51798 US URINE CAPACITY MEASURE: CPT

## 2022-05-11 RX ORDER — NICOTINE 21 MG/24HR
1 PATCH, TRANSDERMAL 24 HOURS TRANSDERMAL
Status: DISCONTINUED | OUTPATIENT
Start: 2022-05-11 | End: 2022-05-11

## 2022-05-11 RX ORDER — SODIUM CHLORIDE 9 MG/ML
100 INJECTION, SOLUTION INTRAVENOUS CONTINUOUS
Status: DISCONTINUED | OUTPATIENT
Start: 2022-05-11 | End: 2022-05-11 | Stop reason: HOSPADM

## 2022-05-11 RX ORDER — TRAZODONE HYDROCHLORIDE 50 MG/1
50 TABLET ORAL NIGHTLY
Status: DISCONTINUED | OUTPATIENT
Start: 2022-05-11 | End: 2022-05-11 | Stop reason: HOSPADM

## 2022-05-11 RX ORDER — OLANZAPINE 5 MG/1
15 TABLET ORAL DAILY
Status: DISCONTINUED | OUTPATIENT
Start: 2022-05-11 | End: 2022-05-11 | Stop reason: HOSPADM

## 2022-05-11 RX ORDER — AMLODIPINE BESYLATE 5 MG/1
5 TABLET ORAL DAILY
Status: DISCONTINUED | OUTPATIENT
Start: 2022-05-11 | End: 2022-05-11 | Stop reason: HOSPADM

## 2022-05-11 RX ORDER — HEPARIN SODIUM 5000 [USP'U]/ML
5000 INJECTION, SOLUTION INTRAVENOUS; SUBCUTANEOUS EVERY 8 HOURS SCHEDULED
Status: DISCONTINUED | OUTPATIENT
Start: 2022-05-11 | End: 2022-05-11 | Stop reason: HOSPADM

## 2022-05-11 RX ORDER — SODIUM CHLORIDE 0.9 % (FLUSH) 0.9 %
10 SYRINGE (ML) INJECTION AS NEEDED
Status: DISCONTINUED | OUTPATIENT
Start: 2022-05-11 | End: 2022-05-11 | Stop reason: HOSPADM

## 2022-05-11 RX ORDER — SODIUM CHLORIDE 0.9 % (FLUSH) 0.9 %
10 SYRINGE (ML) INJECTION EVERY 12 HOURS SCHEDULED
Status: DISCONTINUED | OUTPATIENT
Start: 2022-05-11 | End: 2022-05-11 | Stop reason: HOSPADM

## 2022-05-11 RX ORDER — BUPROPION HYDROCHLORIDE 150 MG/1
150 TABLET ORAL DAILY
Status: DISCONTINUED | OUTPATIENT
Start: 2022-05-11 | End: 2022-05-11 | Stop reason: HOSPADM

## 2022-05-11 RX ADMIN — Medication 10 ML: at 09:40

## 2022-05-11 RX ADMIN — SODIUM CHLORIDE 1000 ML: 9 INJECTION, SOLUTION INTRAVENOUS at 05:08

## 2022-05-11 RX ADMIN — SODIUM CHLORIDE 1000 ML: 9 INJECTION, SOLUTION INTRAVENOUS at 05:53

## 2022-05-11 RX ADMIN — SODIUM CHLORIDE 100 ML/HR: 9 INJECTION, SOLUTION INTRAVENOUS at 07:50

## 2022-05-11 RX ADMIN — OLANZAPINE 15 MG: 5 TABLET, FILM COATED ORAL at 09:40

## 2022-05-11 RX ADMIN — HEPARIN SODIUM 5000 UNITS: 5000 INJECTION, SOLUTION INTRAVENOUS; SUBCUTANEOUS at 06:01

## 2022-05-11 RX ADMIN — BUPROPION HYDROCHLORIDE 150 MG: 150 TABLET, FILM COATED, EXTENDED RELEASE ORAL at 09:40

## 2022-05-11 RX ADMIN — AMLODIPINE BESYLATE 5 MG: 5 TABLET ORAL at 09:40

## 2022-05-11 NOTE — TELEPHONE ENCOUNTER
Caller: Stefano Claire    Relationship to patient: Self    Best call back number: [phone]    Patient is needing: INFORM DR KYLE THAT PATIENT WAS ADMITTED TO [facility] 05/10/22 FOR STOMACH ISSUES.  HE LEFT AGAINST MEDICAL ADVICE

## 2022-05-11 NOTE — SIGNIFICANT NOTE
Pt called out and  requested to leave, he stated the doctor said he might leave and he has already called his ride. Explained to pt. The reason he was here and the course of medical treatment. Pt still requesting to leave after explanation. MD notified, and okay with pt. Leaving. Explained to the pt the risk of leaving without treatment, pt. Still requesting to leave. AMA papers were signed IV and heart monitor removed. Explained to pt to return to ER if symptoms persists or get worse.
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
## 2022-05-11 NOTE — PAYOR COMM NOTE
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 Positive     Opiate Screen Negative     Amphetamine Screen, Urine Positive     Benzodiazepine Screen, Urine Negative     Tricyclic Antidepressants Screen Negative     Methadone Screen, Urine Negative     Barbiturates Screen, Urine Negative     Oxycodone Screen, Urine Negative     Propoxyphene Screen Negative     Buprenorphine, Screen, Urine Negative    Narrative:      Cutoff For Drugs Screened:    Amphetamines               500 ng/ml  Barbiturates               200 ng/ml  Benzodiazepines            150 ng/ml  Cocaine                    150 ng/ml  Methadone                  200 ng/ml  Opiates                    100 ng/ml  Phencyclidine               25 ng/ml  THC                            50 ng/ml  Methamphetamine            500 ng/ml  Tricyclic Antidepressants  300 ng/ml  Oxycodone                  100 ng/ml  Propoxyphene               300 ng/ml  Buprenorphine               10 ng/ml    The normal value for all drugs tested is negative. This report includes unconfirmed screening results, with the cutoff values  listed, to be used for medical treatment purposes only.  Unconfirmed results must not be used for non-medical purposes such as employment or legal testing.  Clinical consideration should be applied to any drug of abuse test, particularly when unconfirmed results are used.      Urinalysis With Microscopic If Indicated (No Culture) - Urine, Clean Catch [[id number]]  (Abnormal) Collected: 05/11/22 0350    Specimen: Urine, Clean Catch Updated: 05/11/22 0405     Color, UA Yellow     Appearance, UA Clear     pH, UA 5.5     Specific Gravity, UA 1.019     Glucose,  mg/dL (Trace)     Ketones, UA Negative     Bilirubin, UA Negative     Blood, UA Negative     Protein, UA Trace     Leuk Esterase, UA Negative     Nitrite, UA Negative     Urobilinogen, UA 0.2 E.U./dL    Narrative:      Urine microscopic not indicated.    Comprehensive Metabolic Panel [[id number]]  (Abnormal) Collected: 05/11/22 0314    Specimen: Blood Updated: 05/11/22 0339     Glucose 112 mg/dL      BUN 38 mg/dL      Creatinine 1.77 mg/dL      Sodium 140 mmol/L      Potassium 4.2 mmol/L      Comment: Slight hemolysis detected by analyzer. Results may be affected.        Chloride 98 mmol/L      CO2 25.0 mmol/L      Calcium 10.0 mg/dL      Total Protein 7.8 g/dL      Albumin 4.60 g/dL      ALT (SGPT) 24 U/L      AST (SGOT) 54 U/L      Alkaline Phosphatase 116 U/L      Total Bilirubin 0.3 mg/dL      Globulin 3.2 gm/dL      Comment: Calculated Result        A/G Ratio 1.4 g/dL      BUN/Creatinine Ratio 21.5     Anion Gap 17.0 mmol/L      eGFR 46.5 mL/min/1.73      Comment: National Kidney Foundation and American Society of Nephrology (ASN) Task Force recommended calculation based on the Chronic Kidney Disease Epidemiology Collaboration (CKD-EPI) equation refit without adjustment for race.       Narrative:      GFR Normal >60  Chronic Kidney Disease <60  Kidney Failure <15      CBC & Differential [[id number]]  (Abnormal) Collected: 05/11/22 0314    Specimen: Blood  Updated: 05/11/22 0323    Narrative:      The following orders were created for panel order CBC & Differential.  Procedure                               Abnormality         Status                     ---------                               -----------         ------                     CBC Auto Differential[[id number]]        Abnormal            Final result                 Please view results for these tests on the individual orders.    CBC Auto Differential [[id number]]  (Abnormal) Collected: 05/11/22 0314    Specimen: Blood Updated: 05/11/22 0323     WBC 8.48 10*3/mm3      RBC 5.26 10*6/mm3      Hemoglobin 15.1 g/dL      Hematocrit 44.1 %      MCV 83.8 fL      MCH 28.7 pg      MCHC 34.2 g/dL      RDW 13.2 %      RDW-SD 40.2 fl      MPV 8.4 fL      Platelets 466 10*3/mm3      Neutrophil % 62.7 %      Lymphocyte % 22.8 %      Monocyte % 11.7 %      Eosinophil % 1.7 %      Basophil % 0.9 %      Immature Grans % 0.2 %      Neutrophils, Absolute 5.32 10*3/mm3      Lymphocytes, Absolute 1.93 10*3/mm3      Monocytes, Absolute 0.99 10*3/mm3      Eosinophils, Absolute 0.14 10*3/mm3      Basophils, Absolute 0.08 10*3/mm3      Immature Grans, Absolute 0.02 10*3/mm3      nRBC 0.0 /100 WBC         Imaging Results (Last 24 Hours)     Procedure Component Value Units Date/Time    XR Chest 1 View [[id number]] Collected: 05/11/22 0638     Updated: 05/11/22 0639    Narrative:      EXAMINATION: Chest one view.    DATE: 5/11/2022.    COMPARISON: None available.    CLINICAL HISTORY: Dyspnea.    FINDINGS:    The lungs and pleural spaces are clear.    The cardiomediastinal silhouette and the pulmonary vessels are within normal limits.      Impression:        No acute cardiopulmonary process.    Electronically signed by:  Kali Severino D.O.    5/11/2022 4:38 AM Mountain Time        Orders (last 24 hrs)      Start     Ordered    05/11/22 2100  traZODone (DESYREL) tablet 50 mg  Nightly         05/11/22 0550    05/11/22 1000  Basic Metabolic  Panel  Once         05/11/22 0519    05/11/22 1000  CBC Auto Differential  Once         05/11/22 0519    05/11/22 1000  Hemoglobin A1c  Once         05/11/22 0519    05/11/22 1000  Magnesium  Once         05/11/22 0519    05/11/22 0926  Inpatient Admission  Once         05/11/22 0925    05/11/22 0900  amLODIPine (NORVASC) tablet 5 mg  Daily         05/11/22 0550    05/11/22 0900  buPROPion XL (WELLBUTRIN XL) 24 hr tablet 150 mg  Daily         05/11/22 0550    05/11/22 0900  OLANZapine (zyPREXA) tablet 15 mg  Daily         05/11/22 0550    05/11/22 0900  sodium chloride 0.9 % flush 10 mL  Every 12 Hours Scheduled         05/11/22 0550    05/11/22 0900  nicotine (NICODERM CQ) 21 MG/24HR patch 1 patch  Every 24 Hours Scheduled,   Status:  Discontinued         05/11/22 0511    05/11/22 0800  Vital Signs  Every 4 Hours       05/11/22 0550    05/11/22 0800  Bladder Scan  Every 4 Hours       05/11/22 0550    05/11/22 0700  Case Management  Consult  Once        Provider:  (Not yet assigned)    05/11/22 0550    05/11/22 0645  heparin (porcine) 5000 UNIT/ML injection 5,000 Units  Every 8 Hours Scheduled         05/11/22 0550    05/11/22 0645  sodium chloride 0.9 % infusion  Continuous         05/11/22 0550    05/11/22 0600  Strict Intake & Output  Every Hour       05/11/22 0550    05/11/22 0600  Incentive Spirometry  Every 4 Hours While Awake       05/11/22 0550    05/11/22 0600  Basic Metabolic Panel  Morning Draw,   Status:  Canceled         05/11/22 0519    05/11/22 0600  CBC Auto Differential  Morning Draw,   Status:  Canceled         05/11/22 0519    05/11/22 0600  Hemoglobin A1c  Morning Draw,   Status:  Canceled         05/11/22 0519    05/11/22 0600  Magnesium  Morning Draw,   Status:  Canceled         05/11/22 0519    05/11/22 0555  DIET MESSAGE Would like a plain cheeseburger with lunch and dinner.  Once        Comments: Would like a plain cheeseburger with lunch and dinner.    05/11/22 6860     05/11/22 0551  Intake & Output  Every Shift       05/11/22 0550    05/11/22 0551  Weigh Patient  Once         05/11/22 0550    05/11/22 0551  Oxygen Therapy- Nasal Cannula; Titrate for SPO2: 90% - 95%  Continuous         05/11/22 0550    05/11/22 0551  Insert Peripheral IV  Once         05/11/22 0550    05/11/22 0551  Saline Lock & Maintain IV Access  Continuous         05/11/22 0550    05/11/22 0551  Pulse Oximetry, Continuous  Continuous         05/11/22 0550    05/11/22 0551  Cardiac Monitoring  Continuous         05/11/22 0550    05/11/22 0551  Turn Patient  Now Then Every 2 Hours         05/11/22 0550    05/11/22 0551  Daily Weights  Daily       05/11/22 0550    05/11/22 0551  Fall Precautions  Continuous         05/11/22 0550    05/11/22 0551  Diet Regular; Cardiac  Diet Effective Now         05/11/22 0550    05/11/22 0551  ECG 12 Lead  STAT         05/11/22 0550    05/11/22 0551  XR Chest 1 View  1 Time Imaging         05/11/22 0550    05/11/22 0551  Notify Provider Acute Urinary Retention  Until Discontinued         05/11/22 0550    05/11/22 0551  Tobacco Cessation Education  Once         05/11/22 0550    05/11/22 0550  sodium chloride 0.9 % flush 10 mL  As Needed         05/11/22 0550    05/11/22 0541  Inpatient Consult to Advance Care Planning  Once        Provider:  (Not yet assigned)    05/11/22 0540    05/11/22 0524  Inpatient Addictionology/[address] [place] [address] Consult Please call 24/7 line at [phone] and  or . Chronic meth use.  Once,   Status:  Canceled        Comments: Please call 24/7 line at [phone] and  or .    Chronic meth use.   Provider:  (Not yet assigned)    05/11/22 0523    05/11/22 0451  sodium chloride 0.9 % bolus 1,000 mL  Once         05/11/22 0449    05/11/22 0450  COVID PRE-OP / PRE-PROCEDURE SCREENING ORDER (NO ISOLATION) - Swab, Nasopharynx  Once         05/11/22 0449    05/11/22 0450  COVID-19, ABBOTT IN-HOUSE,NASAL Swab (NO TRANSPORT MEDIA) 2 HR TAT -  "Swab, Nasopharynx  PROCEDURE ONCE         05/11/22 0449    05/11/22 0448  Initiate Observation Status  Once         05/11/22 0448    05/11/22 0447  Code Status and Medical Interventions:  Continuous         05/11/22 0447    05/11/22 0423  sodium chloride 0.9 % bolus 1,000 mL  Once         05/11/22 0421    05/11/22 0422  Insert peripheral IV  Once        \"And\" Linked Group Details    05/11/22 0421    05/11/22 0421  sodium chloride 0.9 % flush 10 mL  As Needed        \"And\" Linked Group Details    05/11/22 0421    05/11/22 0247  Urinalysis With Microscopic If Indicated (No Culture) - Urine, Clean Catch  Once         05/11/22 0246    05/11/22 0247  Bladder scan  Once         05/11/22 0246    05/11/22 0247  Urine Drug Screen - Urine, Clean Catch  Once         05/11/22 0246    05/11/22 0246  CBC & Differential  Once         05/11/22 0246    05/11/22 0246  Comprehensive Metabolic Panel  Once         05/11/22 0246    05/11/22 0246  CBC Auto Differential  PROCEDURE ONCE         05/11/22 0246    Unscheduled  Up With Assistance  As Needed       05/11/22 0550    Unscheduled  Assess Patient For Urinary Retention  As Needed      Comments: Signs / Symptoms Urinary Retention:  - Bladder Palpable  - Suprapubic / Bladder Discomfort or Pain  - Urge to Void, But Unable  - Reports Unable to Void After 8 Hours    05/11/22 0550    Unscheduled  Utilize Voiding Measures if Retention is Suspected  As Needed      Comments: Voiding Measures:  - Privacy  - Relaxed Environment  - Suprapubic Massage  - Suprapubic Warm Compress  - Trigger Techniques  - Stand to Void   - Bedside Commode    05/11/22 0550    Unscheduled  Bladder Scan for Suspected Urinary Retention  As Needed       05/11/22 0550    Unscheduled  Monitor Every 1-2 Hours for Spontaneous Void if Bladder Scan Volume is Less Than 500mL & Patient is Without Symptoms of Bladder Discomfort / Distention  As Needed       05/11/22 0550    Unscheduled  Straight Cath For Acute Retention if Bladder " Scan Volume is Greater Than 500mL or Patient Has Symptoms of Bladder Discomfort / Distention (Unless Contraindicated)  As Needed       05/11/22 0550    Unscheduled  Measure Post Void Residual  As Needed       05/11/22 0550                Physician Progress Notes (last 24 hours)  Notes from 05/10/22 0929 through 05/11/22 0929   No notes of this type exist for this encounter.         Consult Notes (last 24 hours)  Notes from 05/10/22 0929 through 05/11/22 0929   No notes of this type exist for this encounter.

## 2022-05-11 NOTE — CASE MANAGEMENT/SOCIAL WORK
Discharge Planning Assessment  [facility]     Patient Name: Stefano Claire  MRN: [id number]  Today's Date: 5/11/2022    Admit Date: 5/11/2022     Discharge Needs Assessment     Row Name 05/11/22 0939       Living Environment    People in Home alone    Current Living Arrangements home    Living Arrangement Comments Currently lives in a home of a friend til ready to sell. Previously had apt in [place] but couldn't pay? Friend takes him to all appts. He states that he is working w/a community [employer] close to [facility] and is working on housing,medications for his bipolar and he gets food stamps.       Discharge Needs Assessment    Equipment Currently Used at Home none               Discharge Plan     Row Name 05/11/22 0941       Plan    Plan Home    Patient/Family in Agreement with Plan yes    Plan Comments Spoke w/pt @ bedside. PCP Dr Esther Ellis, has a urologist couldn't remember name.  Fills scripts @ [facility] on [address].  Has supplies to self I&O cath but only performs every 2-3 months, normally wears depends & comes to ER if bladder gets distended. Not current w/DME or HH. Denies any d/c needs @ this time.    Final Discharge Disposition Code 01 - home or self-care    Row Name 05/11/22 0820       Plan    Final Discharge Disposition Code 01 - home or self-care              Continued Care and Services - Admitted Since 5/11/2022    Coordination has not been started for this encounter.       Expected Discharge Date and Time     Expected Discharge Date Expected Discharge Time    May 12, 2022          Demographic Summary    No documentation.                Functional Status     Row Name 05/11/22 0939       Functional Status    Usual Activity Tolerance good       Functional Status, IADL    Medications independent    Meal Preparation independent    Housekeeping independent    Laundry independent    Shopping independent               Psychosocial    No documentation.                Abuse/Neglect    No  documentation.                Legal    No documentation.                Substance Abuse    No documentation.                Patient Forms    No documentation.                   Maury Sanchez RN

## 2022-05-13 LAB
QT INTERVAL: 404 MS
QTC INTERVAL: 510 MS
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
## 2022-05-17 RX ORDER — AMLODIPINE BESYLATE 5 MG/1
5 TABLET ORAL DAILY
Qty: 30 TABLET | Refills: 2 | Status: SHIPPED | OUTPATIENT
Start: 2022-05-17 | End: 2022-12-21 | Stop reason: SDUPTHER

## 2022-05-17 NOTE — TELEPHONE ENCOUNTER
Caller: [facility]    Relationship:     Best call back number: [phone]    Requested Prescriptions:   Requested Prescriptions     Pending Prescriptions Disp Refills   • amLODIPine (NORVASC) 5 MG tablet 30 tablet 2     Sig: Take 1 tablet by mouth Daily.        Pharmacy where request should be sent: [facility] [address] [phone] [facility] [phone] [facility]     Additional details provided by patient:   Does the patient have less than a 3 day supply:  [x] Yes  [] No    Triston Sena Rep   05/17/22 08:37 EDT

## 2022-06-16 ENCOUNTER — OFFICE VISIT (OUTPATIENT)
Dept: FAMILY MEDICINE CLINIC | Facility: CLINIC | Age: 52
End: 2022-06-16

## 2022-06-16 VITALS
BODY MASS INDEX: 19.93 KG/M2 | DIASTOLIC BLOOD PRESSURE: 86 MMHG | HEART RATE: 80 BPM | WEIGHT: 124 LBS | SYSTOLIC BLOOD PRESSURE: 130 MMHG | RESPIRATION RATE: 20 BRPM | TEMPERATURE: 98 F | HEIGHT: 66 IN | OXYGEN SATURATION: 99 %

## 2022-06-16 DIAGNOSIS — K63.5 POLYP OF COLON, UNSPECIFIED PART OF COLON, UNSPECIFIED TYPE: Primary | ICD-10-CM

## 2022-06-16 DIAGNOSIS — Z80.0 FAMILY HISTORY OF COLON CANCER: ICD-10-CM

## 2022-06-16 PROCEDURE — 99214 OFFICE O/P EST MOD 30 MIN: CPT | Performed by: STUDENT IN AN ORGANIZED HEALTH CARE EDUCATION/TRAINING PROGRAM

## 2022-06-16 RX ORDER — TAMSULOSIN HYDROCHLORIDE 0.4 MG/1
0.4 CAPSULE ORAL DAILY
COMMUNITY
Start: 2022-05-20 | End: 2023-05-20

## 2022-06-16 NOTE — PROGRESS NOTES
"Chief Complaint  (3 mnth f/u)    History of Present Illness    htn  Controlled on amlodipine    Coloscope showed 20 polyps.     Reviewed urology note at . He was advised to fu with them in 4-6 weeks for pvr.       The following portions of the patient's history were reviewed and updated as appropriate: allergies, current medications, past family history, past medical history, past social history, past surgical history, and problem list.    OBJECTIVE:  /86   Pulse 80   Temp 98 °F (36.7 °C)   Resp 20   Ht 167.6 cm (66\")   Wt 56.2 kg (124 lb)   SpO2 99%   BMI 20.01 kg/m²       Physical Exam  Constitutional:       General: He is not in acute distress.     Appearance: Normal appearance.   HENT:      Head: Normocephalic and atraumatic.   Eyes:      Extraocular Movements: Extraocular movements intact.   Cardiovascular:      Rate and Rhythm: Normal rate and regular rhythm.      Heart sounds: No murmur heard.  Pulmonary:      Effort: Pulmonary effort is normal. No respiratory distress.      Breath sounds: Normal breath sounds.   Abdominal:      General: Abdomen is flat.   Musculoskeletal:         General: No swelling.      Cervical back: Normal range of motion.   Skin:     Findings: No rash.   Neurological:      General: No focal deficit present.      Mental Status: He is alert.   Psychiatric:         Mood and Affect: Mood normal.                    Assessment and Plan   Diagnoses and all orders for this visit:    1. Polyp of colon, unspecified part of colon, unspecified type (Primary)  -     Ambulatory Referral to Genetics    2. Family history of colon cancer  -     Ambulatory Referral to Genetics      Given 20 colon polyps and family history of colon cancer, refer to genetics.     htn  Continue amlodipine    bph  Reviewed note from urology, cbc cmp magnesium.     Return in about 6 months (around 12/16/2022).       Esther Ellis D.O.  [facility]     "

## 2022-10-11 RX ORDER — PEG-3350, SODIUM SULFATE, SODIUM CHLORIDE, POTASSIUM CHLORIDE, SODIUM ASCORBATE AND ASCORBIC ACID 7.5-2.691G
KIT ORAL
Qty: 1 EACH | Refills: 0 | Status: SHIPPED | OUTPATIENT
Start: 2022-10-11 | End: 2022-12-14 | Stop reason: SDUPTHER

## 2022-10-20 ENCOUNTER — OUTSIDE FACILITY SERVICE (OUTPATIENT)
Dept: GASTROENTEROLOGY | Facility: CLINIC | Age: 52
End: 2022-10-20

## 2022-10-20 PROCEDURE — 45378 DIAGNOSTIC COLONOSCOPY: CPT | Performed by: INTERNAL MEDICINE

## 2022-11-03 ENCOUNTER — TELEPHONE (OUTPATIENT)
Dept: GASTROENTEROLOGY | Facility: CLINIC | Age: 52
End: 2022-11-03

## 2022-12-14 RX ORDER — PEG-3350, SODIUM SULFATE, SODIUM CHLORIDE, POTASSIUM CHLORIDE, SODIUM ASCORBATE AND ASCORBIC ACID 7.5-2.691G
KIT ORAL
Qty: 1 EACH | Refills: 0 | Status: SHIPPED | OUTPATIENT
Start: 2022-12-14

## 2022-12-21 ENCOUNTER — OFFICE VISIT (OUTPATIENT)
Dept: FAMILY MEDICINE CLINIC | Facility: CLINIC | Age: 52
End: 2022-12-21

## 2022-12-21 VITALS
RESPIRATION RATE: 19 BRPM | TEMPERATURE: 98.4 F | BODY MASS INDEX: 20.79 KG/M2 | HEART RATE: 81 BPM | DIASTOLIC BLOOD PRESSURE: 68 MMHG | HEIGHT: 66 IN | WEIGHT: 129.4 LBS | SYSTOLIC BLOOD PRESSURE: 102 MMHG | OXYGEN SATURATION: 93 %

## 2022-12-21 DIAGNOSIS — K63.5 POLYP OF COLON, UNSPECIFIED PART OF COLON, UNSPECIFIED TYPE: Primary | ICD-10-CM

## 2022-12-21 DIAGNOSIS — I10 ESSENTIAL HYPERTENSION: ICD-10-CM

## 2022-12-21 DIAGNOSIS — Z59.82 TRANSPORTATION UNAVAILABLE: ICD-10-CM

## 2022-12-21 PROCEDURE — 99214 OFFICE O/P EST MOD 30 MIN: CPT | Performed by: STUDENT IN AN ORGANIZED HEALTH CARE EDUCATION/TRAINING PROGRAM

## 2022-12-21 RX ORDER — AMLODIPINE BESYLATE 5 MG/1
5 TABLET ORAL DAILY
Qty: 90 TABLET | Refills: 2 | Status: SHIPPED | OUTPATIENT
Start: 2022-12-21

## 2022-12-21 RX ORDER — QUETIAPINE FUMARATE 300 MG/1
TABLET, FILM COATED ORAL
COMMUNITY
Start: 2022-12-10

## 2022-12-21 SDOH — ECONOMIC STABILITY - TRANSPORTATION SECURITY: TRANSPORTATION INSECURITY: Z59.82

## 2022-12-21 NOTE — PROGRESS NOTES
"Chief Complaint  GI Problem (Follow up on colon)    History of Present Illness    Patient was unable to go to genetics apt given lack of transport.     htn  Controlled on amlodipine    The following portions of the patient's history were reviewed and updated as appropriate: allergies, current medications, past family history, past medical history, past social history, past surgical history, and problem list.    OBJECTIVE:  /68   Pulse 81   Temp 98.4 °F (36.9 °C)   Resp 19   Ht 167.6 cm (66\")   Wt 58.7 kg (129 lb 6.4 oz)   SpO2 93%   BMI 20.89 kg/m²       Physical Exam  Constitutional:       General: He is not in acute distress.     Appearance: Normal appearance.   HENT:      Head: Normocephalic and atraumatic.   Eyes:      Extraocular Movements: Extraocular movements intact.   Cardiovascular:      Rate and Rhythm: Normal rate and regular rhythm.      Heart sounds: No murmur heard.  Pulmonary:      Effort: Pulmonary effort is normal. No respiratory distress.      Breath sounds: Normal breath sounds. No stridor. No wheezing, rhonchi or rales.   Skin:     Findings: No rash.   Neurological:      General: No focal deficit present.      Mental Status: He is alert.   Psychiatric:         Mood and Affect: Mood normal.                    Assessment and Plan   Diagnoses and all orders for this visit:    1. Polyp of colon, unspecified part of colon, unspecified type (Primary)  -     Ambulatory Referral to Genetic Counseling/Testing    2. Essential hypertension    3. Transportation unavailable  -     Ambulatory Referral to Social Care Services (Amb Case Mgmt)    Other orders  -     amLODIPine (NORVASC) 5 MG tablet; Take 1 tablet by mouth Daily.  Dispense: 90 tablet; Refill: 2      Continue amlodipine.     Will refer again to genetics given colonoscopy result and consult case management to help him get set up with transport to genetics. He says he has a ride for his colonoscopy.   He has apt for another colonoscopy. "     He says he has continued to be sober and is following with behavioral health.       Return in about 6 months (around 6/21/2023) for Annual.       Esther Ellis D.O.  [facility]

## 2022-12-22 ENCOUNTER — REFERRAL TRIAGE (OUTPATIENT)
Dept: CASE MANAGEMENT | Facility: OTHER | Age: 52
End: 2022-12-22

## 2022-12-22 ENCOUNTER — PATIENT OUTREACH (OUTPATIENT)
Dept: CASE MANAGEMENT | Facility: OTHER | Age: 52
End: 2022-12-22

## 2022-12-22 NOTE — OUTREACH NOTE
Social Work Assessment  Questions/Answers    Flowsheet Row Most Recent Value   Referral Source physician   Reason for Consult community resources   Preferred Language English   Advance Care Planning Reviewed no concerns identified   Advance Care Planning Comments no power of  or living will   People in Home alone   Current Living Arrangements other (see comments)   Primary Care Provided by self   Provides Primary Care For no one   Family Caregiver if Needed none   Quality of Family Relationships supportive   Employment Status disabled   Source of Income none   Financial/Environmental Concerns unemployed   Application for Public Assistance not applied   Medications independent   Meal Preparation independent   Housekeeping independent   Laundry independent   Shopping independent      SDOH updated and reviewed with the patient during this program:  Financial Resource Strain: High Risk   • Difficulty of Paying Living Expenses: Hard      Food Insecurity: No Food Insecurity   • Worried About Running Out of Food in the Last Year: Never true   • Ran Out of Food in the Last Year: Never true      Transportation Needs: No Transportation Needs   • Lack of Transportation (Medical): No   • Lack of Transportation (Non-Medical): No      Housing Stability: Low Risk    • Unable to Pay for Housing in the Last Year: No   • Number of Places Lived in the Last Year: 1   • Unstable Housing in the Last Year: No      Continuing Care   [facility] [place] [address]    Phone: [phone]    Resource for: Transportation Needs   Patient Outreach    SW attempted to contact pt. Friend Tomer answered and stated that he helps pt, but pt was not present at the moment. Tomer was not on most recent DAMON so SW did not relay information. Tomer gave SW another number for pt, [phone]. SW contacted pt. Pt states that he is staying with his sister and denies  the need for any assistance right now. He said his friend Tomer takes him to appointments and gave SW permission to speak with Tomer. MARQUIS called Tomer back and offered to assist with transportation. Tomer states he can take pt to any appointments. MARQUIS provided information about [facility] if for some reason he was not able to take pt to any appointments. Tomer states that he will contact  if he has any issues.    KUNAL RODRIGUEZ -   Ambulatory Case Management    12/22/2022, 13:23 EST

## 2022-12-29 ENCOUNTER — OUTSIDE FACILITY SERVICE (OUTPATIENT)
Dept: GASTROENTEROLOGY | Facility: CLINIC | Age: 52
End: 2022-12-29
Payer: COMMERCIAL

## 2022-12-29 PROCEDURE — 45385 COLONOSCOPY W/LESION REMOVAL: CPT | Performed by: INTERNAL MEDICINE

## 2023-01-25 ENCOUNTER — TELEPHONE (OUTPATIENT)
Dept: GENETICS | Facility: HOSPITAL | Age: 53
End: 2023-01-25
Payer: COMMERCIAL

## 2023-01-25 NOTE — TELEPHONE ENCOUNTER
Rory Jeff, Medical , for Mr. Claire called to cancel the appointment with the Genetic Counselor, Anusha Jacobson. He was unable to locate Mr. Claire who is homeless. He asked if we could reschedule the appointment and mail it to the [facility] address that is on file for Mr. Claire. Will reschedule the appointment and mail it to that location. He hopes to be in contact with the patient at some point and will speak with him regarding the appointment. If the patient decides not to  pursue genetic counseling or testing he would let us know.

## 2023-05-22 ENCOUNTER — APPOINTMENT (OUTPATIENT)
Dept: CT IMAGING | Facility: HOSPITAL | Age: 53
End: 2023-05-22
Payer: COMMERCIAL

## 2023-05-22 ENCOUNTER — HOSPITAL ENCOUNTER (EMERGENCY)
Facility: HOSPITAL | Age: 53
Discharge: LEFT BEFORE DISCHARGE | End: 2023-05-22
Attending: STUDENT IN AN ORGANIZED HEALTH CARE EDUCATION/TRAINING PROGRAM | Admitting: STUDENT IN AN ORGANIZED HEALTH CARE EDUCATION/TRAINING PROGRAM
Payer: COMMERCIAL

## 2023-05-22 VITALS
DIASTOLIC BLOOD PRESSURE: 88 MMHG | OXYGEN SATURATION: 97 % | TEMPERATURE: 97.7 F | SYSTOLIC BLOOD PRESSURE: 130 MMHG | RESPIRATION RATE: 18 BRPM | BODY MASS INDEX: 20.09 KG/M2 | HEART RATE: 112 BPM | WEIGHT: 125 LBS | HEIGHT: 66 IN

## 2023-05-22 DIAGNOSIS — R91.1 LUNG NODULE: ICD-10-CM

## 2023-05-22 DIAGNOSIS — N39.0 ACUTE UTI: Primary | ICD-10-CM

## 2023-05-22 DIAGNOSIS — F15.10 METHAMPHETAMINE USE: ICD-10-CM

## 2023-05-22 LAB
AMPHET+METHAMPHET UR QL: POSITIVE
AMPHETAMINES UR QL: POSITIVE
BACTERIA UR QL AUTO: ABNORMAL /HPF
BARBITURATES UR QL SCN: NEGATIVE
BENZODIAZ UR QL SCN: NEGATIVE
BILIRUB UR QL STRIP: NEGATIVE
BUPRENORPHINE SERPL-MCNC: NEGATIVE NG/ML
CANNABINOIDS SERPL QL: NEGATIVE
CLARITY UR: CLEAR
COCAINE UR QL: NEGATIVE
COLOR UR: YELLOW
CREAT BLDA-MCNC: 1.1 MG/DL
CREAT BLDA-MCNC: 1.1 MG/DL (ref 0.6–1.3)
GLUCOSE UR STRIP-MCNC: ABNORMAL MG/DL
HGB UR QL STRIP.AUTO: NEGATIVE
HYALINE CASTS UR QL AUTO: ABNORMAL /LPF
KETONES UR QL STRIP: NEGATIVE
LEUKOCYTE ESTERASE UR QL STRIP.AUTO: ABNORMAL
METHADONE UR QL SCN: NEGATIVE
NITRITE UR QL STRIP: NEGATIVE
OPIATES UR QL: NEGATIVE
OXYCODONE UR QL SCN: NEGATIVE
PCP UR QL SCN: NEGATIVE
PH UR STRIP.AUTO: 7 [PH] (ref 5–8)
PROPOXYPH UR QL: NEGATIVE
PROT UR QL STRIP: NEGATIVE
RBC # UR STRIP: ABNORMAL /HPF
REF LAB TEST METHOD: ABNORMAL
SP GR UR STRIP: 1.01 (ref 1–1.03)
SQUAMOUS #/AREA URNS HPF: ABNORMAL /HPF
TRICYCLICS UR QL SCN: NEGATIVE
UROBILINOGEN UR QL STRIP: ABNORMAL
WBC # UR STRIP: ABNORMAL /HPF

## 2023-05-22 PROCEDURE — 82565 ASSAY OF CREATININE: CPT

## 2023-05-22 PROCEDURE — 80306 DRUG TEST PRSMV INSTRMNT: CPT | Performed by: STUDENT IN AN ORGANIZED HEALTH CARE EDUCATION/TRAINING PROGRAM

## 2023-05-22 PROCEDURE — 99282 EMERGENCY DEPT VISIT SF MDM: CPT

## 2023-05-22 PROCEDURE — 87086 URINE CULTURE/COLONY COUNT: CPT | Performed by: STUDENT IN AN ORGANIZED HEALTH CARE EDUCATION/TRAINING PROGRAM

## 2023-05-22 PROCEDURE — 25510000001 IOPAMIDOL 61 % SOLUTION: Performed by: STUDENT IN AN ORGANIZED HEALTH CARE EDUCATION/TRAINING PROGRAM

## 2023-05-22 PROCEDURE — 81001 URINALYSIS AUTO W/SCOPE: CPT | Performed by: STUDENT IN AN ORGANIZED HEALTH CARE EDUCATION/TRAINING PROGRAM

## 2023-05-22 PROCEDURE — 74177 CT ABD & PELVIS W/CONTRAST: CPT

## 2023-05-22 RX ORDER — CEFDINIR 300 MG/1
300 CAPSULE ORAL 2 TIMES DAILY
Qty: 20 CAPSULE | Refills: 0 | Status: SHIPPED | OUTPATIENT
Start: 2023-05-22 | End: 2023-06-01

## 2023-05-22 RX ORDER — CEFDINIR 300 MG/1
300 CAPSULE ORAL ONCE
Status: DISCONTINUED | OUTPATIENT
Start: 2023-05-22 | End: 2023-05-22 | Stop reason: HOSPADM

## 2023-05-22 RX ADMIN — IOPAMIDOL 60 ML: 612 INJECTION, SOLUTION INTRAVENOUS at 05:26

## 2023-05-22 NOTE — DISCHARGE INSTRUCTIONS
Use the provided antibiotic as directed and maintain good oral hydration to help with both symptoms and clearing the infection.  Follow-up with your PCP and with urology.  Please return to the ED or seek other medical care for any concerning symptoms.

## 2023-05-22 NOTE — ED PROVIDER NOTES
EMERGENCY DEPARTMENT ENCOUNTER    Pt Name: Stefano Claire  MRN: [id number]  Pt :   1970  Room Number:  15/15  Date of encounter:  2023  PCP: Esther Ellis DO  ED Provider: Kip Del Rio MD    Historian: Patient      HPI:  Chief Complaint: Concern for urinary retention        Context: Stefano Claire is a 52-year-old male with history of testicle cancer status post orchiectomy and BPH who presents for inability to urinate since about noon today.  He says he is worried his cancer has come back and would like a CT scan.  He says he feels like his pelvic is being distended and he is not able to urinate though he says he does not particularly feel like he needs to urinate.  Denies significant pain.  No other complaints at this time.      PAST MEDICAL HISTORY  Past Medical History:   Diagnosis Date   • Bipolar 1 disorder    • Cancer     testicular   • Depression    • Diabetes mellitus    • Hypertension          PAST SURGICAL HISTORY  Past Surgical History:   Procedure Laterality Date   • TESTICLE SURGERY     • TESTICLE SURGERY           FAMILY HISTORY  Family History   Problem Relation Age of Onset   • Colon cancer Mother    • No Known Problems Father          SOCIAL HISTORY  Social History     Socioeconomic History   • Marital status: Single   Tobacco Use   • Smoking status: Former   • Smokeless tobacco: Current     Types: Chew   Vaping Use   • Vaping Use: Never used   Substance and Sexual Activity   • Alcohol use: Not Currently   • Drug use: Yes     Types: Methamphetamines   • Sexual activity: Defer         ALLERGIES  Patient has no known allergies.        REVIEW OF SYSTEMS  Review of Systems       All systems reviewed and negative except for those discussed in HPI.       PHYSICAL EXAM    I have reviewed the triage vital signs and nursing notes.    ED Triage Vitals [23 0333]   Temp Heart Rate Resp BP SpO2   97.7 °F (36.5 °C) 112 18 130/88 97 %      Temp src Heart Rate Source  Patient Position BP Location FiO2 (%)   Oral -- Lying Left arm --       Physical Exam  GENERAL:   Appears anxious, pacing  HENT: Nares patent.  EYES: No scleral icterus.  CV: Regular rhythm, regular rate.  RESPIRATORY: Normal effort.  No audible wheezes, rales or rhonchi.  ABDOMEN: Soft, nontender, no pelvic distention, point-of-care ultrasound does not show significant amount of urine in the bladder.  MUSCULOSKELETAL: No deformities.   NEURO: Alert, moves all extremities, follows commands.  SKIN: Warm, dry, no rash visualized.      LAB RESULTS  Recent Results (from the past 24 hour(s))   CK    Collection Time: 05/21/23  8:15 PM    Specimen: Blood   Result Value Ref Range    Creatine Kinase 369 (H) 39 - 308 U/L   POC Creatinine    Collection Time: 05/22/23  5:12 AM    Specimen: Blood   Result Value Ref Range    Creatinine 1.10 0.60 - 1.30 mg/dL   Urinalysis With Microscopic If Indicated (No Culture) - Urine, Clean Catch    Collection Time: 05/22/23  5:13 AM    Specimen: Urine, Clean Catch   Result Value Ref Range    Color, UA Yellow Yellow, Straw    Appearance, UA Clear Clear    pH, UA 7.0 5.0 - 8.0    Specific Gravity, UA 1.011 1.001 - 1.030    Glucose,  mg/dL (Trace) (A) Negative    Ketones, UA Negative Negative    Bilirubin, UA Negative Negative    Blood, UA Negative Negative    Protein, UA Negative Negative    Leuk Esterase, UA Small (1+) (A) Negative    Nitrite, UA Negative Negative    Urobilinogen, UA 0.2 E.U./dL 0.2 - 1.0 E.U./dL   POC Creatinine    Collection Time: 05/22/23  5:13 AM    Specimen: Blood   Result Value Ref Range    Creatinine 1.10 mg/dL   Urine Drug Screen - Urine, Clean Catch    Collection Time: 05/22/23  5:13 AM    Specimen: Urine, Clean Catch   Result Value Ref Range    THC, Screen, Urine Negative Negative    Phencyclidine (PCP), Urine Negative Negative    Cocaine Screen, Urine Negative Negative    Methamphetamine, Ur Positive (A) Negative    Opiate Screen Negative Negative     Amphetamine Screen, Urine Positive (A) Negative    Benzodiazepine Screen, Urine Negative Negative    Tricyclic Antidepressants Screen Negative Negative    Methadone Screen, Urine Negative Negative    Barbiturates Screen, Urine Negative Negative    Oxycodone Screen, Urine Negative Negative    Propoxyphene Screen Negative Negative    Buprenorphine, Screen, Urine Negative Negative   Urinalysis, Microscopic Only - Urine, Clean Catch    Collection Time: 05/22/23  5:13 AM    Specimen: Urine, Clean Catch   Result Value Ref Range    RBC, UA 0-2 None Seen, 0-2 /HPF    WBC, UA 31-50 (A) None Seen, 0-2 /HPF    Bacteria, UA None Seen None Seen, Trace /HPF    Squamous Epithelial Cells, UA 0-2 None Seen, 0-2 /HPF    Hyaline Casts, UA 0-6 0 - 6 /LPF    Methodology Automated Microscopy        If labs were ordered, I independently reviewed the results and considered them in treating the patient.        RADIOLOGY  CT Abdomen Pelvis With Contrast    Result Date: 5/22/2023  EXAMINATION: CT SCAN OF THE ABDOMEN AND PELVIS WITH INTRAVENOUS CONTRAST DATE OF EXAM: 5/22/2023 5:23 AM HISTORY: Pelvic distension, unable to urinate, hx of testicle cancer and BPH. COMPARISON: None. TECHNIQUE: CT examination of the abdomen and pelvis was performed following the intravenous administration of [60 mL Isovue-300. CT dose lowering techniques were used, to include: automated exposure control, adjustment for patient size, and/or use of iterative reconstruction. FINDINGS: ABDOMEN/PELVIS: Lower Chest: Middle lobe 7 mm nodule with surrounding groundglass opacity, partially imaged. Liver: Normal. Gallbladder/Biliary: Normal. Pancreas: Normal. Spleen: Normal. Adrenal Glands: Normal. Kidneys, Ureters, Urinary Bladder:  Moderately thickened bladder, contains a small amount of gas GI Tract: Normal. Mesentery/Peritoneum: Normal. Reproductive: Normal. Vasculature: Normal. Lymph Nodes: Normal. Abdominal Wall: Normal. Musculoskeletal: Normal.     1.  Moderately  thickened bladder, query cystitis. Contains a small amount of gas, perhaps from recent catheterization, or infection. 2.  Partially imaged right middle lobe 7 mm inflammatory nodule. Recommend comparison with priors. If not available, recommend CT chest without contrast. Electronically signed by:  Jessie Freitas M.D.  5/22/2023 4:51 AM Mountain Time      I ordered and independently reviewed the above noted radiographic studies.      I viewed images of CT scan of the abdomen pelvis which shows bladder thickening consistent with the known urinary tract infection but no other acute findings that I can appreciate.  Formal overread appreciates right middle lobe lung nodule which the patient was made aware of.    See radiologist's dictation for official interpretation.        PROCEDURES    Procedures    No orders to display       MEDICATIONS GIVEN IN ER    Medications   cefdinir (OMNICEF) capsule 300 mg (300 mg Oral Not Given 5/22/23 6015)   iopamidol (ISOVUE-300) 61 % injection 100 mL (60 mL Intravenous Given 5/22/23 0526)         MEDICAL DECISION MAKING, PROGRESS, and CONSULTS    All labs have been independently reviewed by me.  All radiology studies have been reviewed by me and the radiologist dictating the report.  All EKG's have been independently viewed and interpreted by me.      Discussion below represents my analysis of pertinent findings related to patient's condition, differential diagnosis, treatment plan and final disposition.      Differential diagnosis:    Acute urinary retention, acute renal failure, sepsis, substance abuse, urinary tract infection, intra-abdominal infection      Additional sources:    - Discussed/ obtained information from independent historians:      - External (non-ED) record review: [facility] emergency department note from yesterday where patient was high on methamphetamine and required straight cath for urinary retention    - Chronic or social conditions impacting  care: Polysubstance abuse    - Shared decision making: Patient eloped he was not counseled on return precautions we will try to contact.      Orders placed during this visit:  Orders Placed This Encounter   Procedures   • Urine Culture - Urine,   • CT Abdomen Pelvis With Contrast   • Urinalysis With Microscopic If Indicated (No Culture) - Urine, Clean Catch   • Urine Drug Screen - Urine, Clean Catch   • Urinalysis, Microscopic Only - Urine, Clean Catch   • PO fluids  Misc Nursing Order (Specify)   • POC Creatinine   • POC Creatinine         Additional orders considered but not ordered:      ED Course:    Consultants:      ED Course as of 05/22/23 0828   Mon May 22, 2023   0343 This a 52-year-old male with history of testicle cancer status post orchiectomy and BPH who presents for inability to urinate since about noon today.  He says he is worried his cancer has come back and would like a CT scan.  He says he feels like his pelvic is being distended and he is not able to urinate though he says he does not particularly feel like he needs to urinate.  Denies significant pain.  No other complaints at this time. [CC]   0344 He arrived awake and alert very anxious but in no acute distress.  Point-of-care ultrasound performed and I do not see any fluid in his bladder that would suggest that we need to place a Charles catheter I made him aware of this and he is now declining catheter saying maybe he just does not need to pee.  He is still concern for malignancy and it is possible he has a bilateral obstruction upstream is obtaining CT scan of the abdomen pelvis to evaluate for other pathology. [CC]      ED Course User Index  [CC] Kip Del Rio MD     Patient arrived mildly tachycardic, anxious, appears to be tweaking on some sort of stimulant medication.  He is concerned that he has not urinated since noon and point-of-care ultrasound does not show any significant fluid in the bladder which she was reassured by and  is now saying he does not want catheterization.  CT scan of the abdomen pelvis shows bladder wall thickening consistent with urinary tract infection, urinalysis is infected with gross pyuria.  Urine culture has been sent.  Point-of-care creatinine is normal.  Plan was to start cefdinir here in the emergency department and give him prescription with urology follow-up however when I went to the bedside nursing staff were unable to locate him presumably he is eloped we will try to contact.             AS OF 08:28 EDT VITALS:    BP - 130/88  HR - 112  TEMP - 97.7 °F (36.5 °C) (Oral)  O2 SATS - 97%                  DIAGNOSIS  Final diagnoses:   Acute UTI   Lung nodule   Methamphetamine use         DISPOSITION  Eloped      Please note that portions of this document were completed with voice recognition software.        Kip Del Rio MD  05/22/23 3641

## 2023-05-23 LAB — BACTERIA SPEC AEROBE CULT: NO GROWTH

## 2023-06-04 ENCOUNTER — HOSPITAL ENCOUNTER (EMERGENCY)
Facility: HOSPITAL | Age: 53
Discharge: ANOTHER HOSPITAL | End: 2023-06-04
Attending: EMERGENCY MEDICINE | Admitting: EMERGENCY MEDICINE
Payer: COMMERCIAL

## 2023-06-04 VITALS
OXYGEN SATURATION: 99 % | HEIGHT: 66 IN | BODY MASS INDEX: 20.09 KG/M2 | DIASTOLIC BLOOD PRESSURE: 63 MMHG | SYSTOLIC BLOOD PRESSURE: 107 MMHG | RESPIRATION RATE: 18 BRPM | TEMPERATURE: 97.6 F | HEART RATE: 89 BPM | WEIGHT: 125 LBS

## 2023-06-04 DIAGNOSIS — R45.851 SUICIDAL INTENT: ICD-10-CM

## 2023-06-04 DIAGNOSIS — F15.10 METHAMPHETAMINE ABUSE: Primary | ICD-10-CM

## 2023-06-04 LAB
ALBUMIN SERPL-MCNC: 4.6 G/DL (ref 3.5–5.2)
ALBUMIN/GLOB SERPL: 1.4 G/DL
ALP SERPL-CCNC: 104 U/L (ref 39–117)
ALT SERPL W P-5'-P-CCNC: 25 U/L (ref 1–41)
AMPHET+METHAMPHET UR QL: POSITIVE
AMPHETAMINES UR QL: POSITIVE
ANION GAP SERPL CALCULATED.3IONS-SCNC: 16.3 MMOL/L (ref 5–15)
APAP SERPL-MCNC: <5 MCG/ML (ref 0–30)
AST SERPL-CCNC: 43 U/L (ref 1–40)
BACTERIA UR QL AUTO: ABNORMAL /HPF
BARBITURATES UR QL SCN: NEGATIVE
BASOPHILS # BLD AUTO: 0.1 10*3/MM3 (ref 0–0.2)
BASOPHILS NFR BLD AUTO: 1 % (ref 0–1.5)
BENZODIAZ UR QL SCN: NEGATIVE
BILIRUB SERPL-MCNC: 0.7 MG/DL (ref 0–1.2)
BILIRUB UR QL STRIP: NEGATIVE
BUN SERPL-MCNC: 31 MG/DL (ref 6–20)
BUN/CREAT SERPL: 22.6 (ref 7–25)
BUPRENORPHINE SERPL-MCNC: NEGATIVE NG/ML
CALCIUM SPEC-SCNC: 9 MG/DL (ref 8.6–10.5)
CANNABINOIDS SERPL QL: NEGATIVE
CHLORIDE SERPL-SCNC: 99 MMOL/L (ref 98–107)
CLARITY UR: CLEAR
CO2 SERPL-SCNC: 21.7 MMOL/L (ref 22–29)
COCAINE UR QL: NEGATIVE
COLOR UR: YELLOW
CREAT SERPL-MCNC: 1.37 MG/DL (ref 0.76–1.27)
DEPRECATED RDW RBC AUTO: 44.3 FL (ref 37–54)
EGFRCR SERPLBLD CKD-EPI 2021: 62.8 ML/MIN/1.73
EOSINOPHIL # BLD AUTO: 0.14 10*3/MM3 (ref 0–0.4)
EOSINOPHIL NFR BLD AUTO: 1.3 % (ref 0.3–6.2)
ERYTHROCYTE [DISTWIDTH] IN BLOOD BY AUTOMATED COUNT: 13.8 % (ref 12.3–15.4)
ETHANOL BLD-MCNC: <10 MG/DL (ref 0–10)
ETHANOL UR QL: <0.01 %
FLUAV SUBTYP SPEC NAA+PROBE: NOT DETECTED
FLUBV RNA ISLT QL NAA+PROBE: NOT DETECTED
GLOBULIN UR ELPH-MCNC: 3.3 GM/DL
GLUCOSE BLDC GLUCOMTR-MCNC: 98 MG/DL (ref 70–130)
GLUCOSE SERPL-MCNC: 102 MG/DL (ref 65–99)
GLUCOSE UR STRIP-MCNC: ABNORMAL MG/DL
GRAN CASTS URNS QL MICRO: ABNORMAL /LPF
HCT VFR BLD AUTO: 43 % (ref 37.5–51)
HGB BLD-MCNC: 14.5 G/DL (ref 13–17.7)
HGB UR QL STRIP.AUTO: NEGATIVE
HOLD SPECIMEN: NORMAL
HOLD SPECIMEN: NORMAL
HYALINE CASTS UR QL AUTO: ABNORMAL /LPF
IMM GRANULOCYTES # BLD AUTO: 0.05 10*3/MM3 (ref 0–0.05)
IMM GRANULOCYTES NFR BLD AUTO: 0.5 % (ref 0–0.5)
KETONES UR QL STRIP: ABNORMAL
LEUKOCYTE ESTERASE UR QL STRIP.AUTO: NEGATIVE
LYMPHOCYTES # BLD AUTO: 2.24 10*3/MM3 (ref 0.7–3.1)
LYMPHOCYTES NFR BLD AUTO: 21.3 % (ref 19.6–45.3)
MAGNESIUM SERPL-MCNC: 2.3 MG/DL (ref 1.6–2.6)
MCH RBC QN AUTO: 29.5 PG (ref 26.6–33)
MCHC RBC AUTO-ENTMCNC: 33.7 G/DL (ref 31.5–35.7)
MCV RBC AUTO: 87.4 FL (ref 79–97)
METHADONE UR QL SCN: NEGATIVE
MONOCYTES # BLD AUTO: 1.03 10*3/MM3 (ref 0.1–0.9)
MONOCYTES NFR BLD AUTO: 9.8 % (ref 5–12)
NEUTROPHILS NFR BLD AUTO: 6.95 10*3/MM3 (ref 1.7–7)
NEUTROPHILS NFR BLD AUTO: 66.1 % (ref 42.7–76)
NITRITE UR QL STRIP: NEGATIVE
NRBC BLD AUTO-RTO: 0 /100 WBC (ref 0–0.2)
OPIATES UR QL: NEGATIVE
OXYCODONE UR QL SCN: NEGATIVE
PCP UR QL SCN: NEGATIVE
PH UR STRIP.AUTO: 5.5 [PH] (ref 5–8)
PLATELET # BLD AUTO: 454 10*3/MM3 (ref 140–450)
PMV BLD AUTO: 8.3 FL (ref 6–12)
POTASSIUM SERPL-SCNC: 3.5 MMOL/L (ref 3.5–5.2)
PROPOXYPH UR QL: NEGATIVE
PROT SERPL-MCNC: 7.9 G/DL (ref 6–8.5)
PROT UR QL STRIP: ABNORMAL
RBC # BLD AUTO: 4.92 10*6/MM3 (ref 4.14–5.8)
RBC # UR STRIP: ABNORMAL /HPF
REF LAB TEST METHOD: ABNORMAL
SALICYLATES SERPL-MCNC: 0.5 MG/DL
SARS-COV-2 RNA RESP QL NAA+PROBE: NOT DETECTED
SODIUM SERPL-SCNC: 137 MMOL/L (ref 136–145)
SP GR UR STRIP: >=1.03 (ref 1–1.03)
SQUAMOUS #/AREA URNS HPF: ABNORMAL /HPF
TRICYCLICS UR QL SCN: NEGATIVE
UROBILINOGEN UR QL STRIP: ABNORMAL
WBC # UR STRIP: ABNORMAL /HPF
WBC NRBC COR # BLD: 10.51 10*3/MM3 (ref 3.4–10.8)
WHOLE BLOOD HOLD COAG: NORMAL
WHOLE BLOOD HOLD SPECIMEN: NORMAL

## 2023-06-04 PROCEDURE — 83735 ASSAY OF MAGNESIUM: CPT | Performed by: EMERGENCY MEDICINE

## 2023-06-04 PROCEDURE — 96374 THER/PROPH/DIAG INJ IV PUSH: CPT

## 2023-06-04 PROCEDURE — 93005 ELECTROCARDIOGRAM TRACING: CPT | Performed by: EMERGENCY MEDICINE

## 2023-06-04 PROCEDURE — 99285 EMERGENCY DEPT VISIT HI MDM: CPT

## 2023-06-04 PROCEDURE — 80306 DRUG TEST PRSMV INSTRMNT: CPT | Performed by: EMERGENCY MEDICINE

## 2023-06-04 PROCEDURE — 80179 DRUG ASSAY SALICYLATE: CPT | Performed by: EMERGENCY MEDICINE

## 2023-06-04 PROCEDURE — 80053 COMPREHEN METABOLIC PANEL: CPT | Performed by: EMERGENCY MEDICINE

## 2023-06-04 PROCEDURE — 85025 COMPLETE CBC W/AUTO DIFF WBC: CPT | Performed by: EMERGENCY MEDICINE

## 2023-06-04 PROCEDURE — 25010000002 LORAZEPAM PER 2 MG: Performed by: EMERGENCY MEDICINE

## 2023-06-04 PROCEDURE — 81001 URINALYSIS AUTO W/SCOPE: CPT | Performed by: EMERGENCY MEDICINE

## 2023-06-04 PROCEDURE — 93005 ELECTROCARDIOGRAM TRACING: CPT

## 2023-06-04 PROCEDURE — 80143 DRUG ASSAY ACETAMINOPHEN: CPT | Performed by: EMERGENCY MEDICINE

## 2023-06-04 PROCEDURE — 96361 HYDRATE IV INFUSION ADD-ON: CPT

## 2023-06-04 PROCEDURE — 82077 ASSAY SPEC XCP UR&BREATH IA: CPT | Performed by: EMERGENCY MEDICINE

## 2023-06-04 PROCEDURE — 87636 SARSCOV2 & INF A&B AMP PRB: CPT | Performed by: PHYSICIAN ASSISTANT

## 2023-06-04 PROCEDURE — 82948 REAGENT STRIP/BLOOD GLUCOSE: CPT

## 2023-06-04 RX ORDER — LORAZEPAM 2 MG/ML
1 INJECTION INTRAMUSCULAR ONCE
Status: COMPLETED | OUTPATIENT
Start: 2023-06-04 | End: 2023-06-04

## 2023-06-04 RX ORDER — SODIUM CHLORIDE 0.9 % (FLUSH) 0.9 %
10 SYRINGE (ML) INJECTION AS NEEDED
Status: DISCONTINUED | OUTPATIENT
Start: 2023-06-04 | End: 2023-06-04 | Stop reason: HOSPADM

## 2023-06-04 RX ADMIN — LORAZEPAM 1 MG: 2 INJECTION INTRAMUSCULAR; INTRAVENOUS at 09:34

## 2023-06-04 RX ADMIN — SODIUM CHLORIDE 1000 ML: 9 INJECTION, SOLUTION INTRAVENOUS at 09:35

## 2023-06-04 NOTE — ED PROVIDER NOTES
Subjective  History of Present Illness:    Chief Complaint: Dog abuse  History of Present Illness: 27-year-old male presents via EMS with a history of drug abuse requesting inpatient treatment.  He was recently at [facility], for meth abuse, admits that he did relapse, and has been using again, last used this morning.  EMS reports that upon arrival, the patient's blood pressure was elevated systolic over 200, he was given nitroglycerin and nitroglycerin paste was placed on his chest.  He admits to feeling anxious, uneasy, and feels like he is withdrawing.  Onset: Several days  Duration: Several days but this  Exacerbating / Alleviating factors: Weakness  Associated symptoms: Dizziness weakness      Nurses Notes reviewed and agree, including vitals, allergies, social history and prior medical history.     REVIEW OF SYSTEMS: All systems reviewed and not pertinent unless noted.    Review of Systems   Constitutional:  Positive for fatigue.   Neurological:  Positive for weakness.   Psychiatric/Behavioral:  Positive for agitation.    All other systems reviewed and are negative.    Past Medical History:   Diagnosis Date    Bipolar 1 disorder     Cancer     testicular    Depression     Diabetes mellitus     Hypertension        Allergies:    Patient has no known allergies.      Past Surgical History:   Procedure Laterality Date    TESTICLE SURGERY      TESTICLE SURGERY           Social History     Socioeconomic History    Marital status: Single   Tobacco Use    Smoking status: Former    Smokeless tobacco: Current     Types: Chew   Vaping Use    Vaping Use: Never used   Substance and Sexual Activity    Alcohol use: Not Currently    Drug use: Yes     Types: Methamphetamines    Sexual activity: Defer         Family History   Problem Relation Age of Onset    Colon cancer Mother     No Known Problems Father        Objective  Physical Exam:  /63 (BP Location: Left arm, Patient Position: Lying)   Pulse 89   Temp 97.6 °F  "(36.4 °C) (Oral)   Resp 18   Ht 167.6 cm (66\")   Wt 56.7 kg (125 lb)   SpO2 99%   BMI 20.18 kg/m²      Physical Exam  Vitals and nursing note reviewed.   Constitutional:       Appearance: He is well-developed.      Comments: Disheveled appearance   HENT:      Head: Normocephalic and atraumatic.      Mouth/Throat:      Mouth: Mucous membranes are moist.   Eyes:      Extraocular Movements: Extraocular movements intact.   Cardiovascular:      Rate and Rhythm: Normal rate and regular rhythm.   Pulmonary:      Effort: Pulmonary effort is normal.      Breath sounds: Normal breath sounds.   Abdominal:      Palpations: Abdomen is soft.   Musculoskeletal:         General: Normal range of motion.      Cervical back: Normal range of motion.   Skin:     General: Skin is warm and dry.   Neurological:      Mental Status: He is oriented to person, place, and time.   Psychiatric:      Comments: Anxiousness         Procedures    ED Course:    ED Course as of 06/04/23 1502   Sun Jun 04, 2023   0919 Urinalysis With Microscopic If Indicated (No Culture) - Urine, Clean Catch [CS]   1031 Behavioral health will perform a bedside evaluation [CS]   1222 Patient has been referred [CS]      ED Course User Index  [CS] Tomer Stallworth Jr., ANDREW       Lab Results (last 24 hours)       Procedure Component Value Units Date/Time    POC Glucose Once [[id number]]  (Normal) Collected: 06/04/23 0917    Specimen: Blood Updated: 06/04/23 0919     Glucose 98 mg/dL      Comment: Serial Number: [id number]:  [id number]       CBC & Differential [[id number]]  (Abnormal) Collected: 06/04/23 0921    Specimen: Blood Updated: 06/04/23 0932    Narrative:      The following orders were created for panel order CBC & Differential.  Procedure                               Abnormality         Status                     ---------                               -----------         ------                     CBC Auto Differential[[id number]]        Abnormal          "   Final result                 Please view results for these tests on the individual orders.    Comprehensive Metabolic Panel [[id number]]  (Abnormal) Collected: 06/04/23 0921    Specimen: Blood Updated: 06/04/23 0952     Glucose 102 mg/dL      BUN 31 mg/dL      Creatinine 1.37 mg/dL      Sodium 137 mmol/L      Potassium 3.5 mmol/L      Chloride 99 mmol/L      CO2 21.7 mmol/L      Calcium 9.0 mg/dL      Total Protein 7.9 g/dL      Albumin 4.6 g/dL      ALT (SGPT) 25 U/L      AST (SGOT) 43 U/L      Alkaline Phosphatase 104 U/L      Total Bilirubin 0.7 mg/dL      Globulin 3.3 gm/dL      A/G Ratio 1.4 g/dL      BUN/Creatinine Ratio 22.6     Anion Gap 16.3 mmol/L      eGFR 62.8 mL/min/1.73     Narrative:      GFR Normal >60  Chronic Kidney Disease <60  Kidney Failure <15      Acetaminophen Level [[id number]]  (Normal) Collected: 06/04/23 0921    Specimen: Blood Updated: 06/04/23 0952     Acetaminophen <5.0 mcg/mL     Narrative:      Toxic = Greater than 150 mcg/mL    Ethanol [[id number]] Collected: 06/04/23 0921    Specimen: Blood Updated: 06/04/23 0952     Ethanol <10 mg/dL      Ethanol % <0.010 %     Narrative:      This result is for medical use only and should not be used for forensic purposes.    Salicylate Level [[id number]]  (Normal) Collected: 06/04/23 0921    Specimen: Blood Updated: 06/04/23 0952     Salicylate 0.5 mg/dL     Magnesium [[id number]]  (Normal) Collected: 06/04/23 0921    Specimen: Blood Updated: 06/04/23 0952     Magnesium 2.3 mg/dL     CBC Auto Differential [[id number]]  (Abnormal) Collected: 06/04/23 0921    Specimen: Blood Updated: 06/04/23 0932     WBC 10.51 10*3/mm3      RBC 4.92 10*6/mm3      Hemoglobin 14.5 g/dL      Hematocrit 43.0 %      MCV 87.4 fL      MCH 29.5 pg      MCHC 33.7 g/dL      RDW 13.8 %      RDW-SD 44.3 fl      MPV 8.3 fL      Platelets 454 10*3/mm3      Neutrophil % 66.1 %      Lymphocyte % 21.3 %      Monocyte % 9.8 %      Eosinophil % 1.3 %      Basophil % 1.0 %      Immature  Grans % 0.5 %      Neutrophils, Absolute 6.95 10*3/mm3      Lymphocytes, Absolute 2.24 10*3/mm3      Monocytes, Absolute 1.03 10*3/mm3      Eosinophils, Absolute 0.14 10*3/mm3      Basophils, Absolute 0.10 10*3/mm3      Immature Grans, Absolute 0.05 10*3/mm3      nRBC 0.0 /100 WBC     Urine Drug Screen - Urine, Clean Catch [[id number]]  (Abnormal) Collected: 06/04/23 0934    Specimen: Urine, Clean Catch Updated: 06/04/23 1000     THC, Screen, Urine Negative     Phencyclidine (PCP), Urine Negative     Cocaine Screen, Urine Negative     Methamphetamine, Ur Positive     Opiate Screen Negative     Amphetamine Screen, Urine Positive     Benzodiazepine Screen, Urine Negative     Tricyclic Antidepressants Screen Negative     Methadone Screen, Urine Negative     Barbiturates Screen, Urine Negative     Oxycodone Screen, Urine Negative     Propoxyphene Screen Negative     Buprenorphine, Screen, Urine Negative    Narrative:      Limitations of this procedure include the possibility of false positives due to interfering substances in the urine sample. Clinical data should be correlated with any questionable result. Positive results should be considered Presumptive Positive until results are confirmed with another methodology such as HPLC or GCMS.    Urinalysis With Microscopic If Indicated (No Culture) - Urine, Clean Catch [[id number]]  (Abnormal) Collected: 06/04/23 0934    Specimen: Urine, Clean Catch Updated: 06/04/23 0950     Color, UA Yellow     Appearance, UA Clear     pH, UA 5.5     Specific Gravity, UA >=1.030     Glucose,  mg/dL (1+)     Ketones, UA Trace     Bilirubin, UA Negative     Blood, UA Negative     Protein, UA 30 mg/dL (1+)     Leuk Esterase, UA Negative     Nitrite, UA Negative     Urobilinogen, UA 0.2 E.U./dL    Urinalysis, Microscopic Only - Urine, Clean Catch [[id number]]  (Abnormal) Collected: 06/04/23 0934    Specimen: Urine, Clean Catch Updated: 06/04/23 1000     RBC, UA 0-2 /HPF      WBC, UA 3-5  /HPF      Bacteria, UA Trace /HPF      Squamous Epithelial Cells, UA 3-6 /HPF      Hyaline Casts, UA 7-12 /LPF      Granular Casts, UA 0-2 /LPF      Methodology Manual Light Microscopy    COVID-19 and FLU A/B PCR - Swab, Nasopharynx [[id number]]  (Normal) Collected: 06/04/23 1059    Specimen: Swab from Nasopharynx Updated: 06/04/23 1121     COVID19 Not Detected     Influenza A PCR Not Detected     Influenza B PCR Not Detected    Narrative:      Fact sheet for providers: https://www.fda.gov/media/499110/download    Fact sheet for patients: https://www.fda.gov/media/632274/download    Test performed by PCR.             No radiology results from the last 24 hrs       Medical Decision Making  52-year-old male presents with methamphetamine abuse, recent inpatient treatment, requesting inpatient treatment for continued abuse, upon evaluation by behavioral health, he admitted to suicidal thoughts and intent, at which point security was placed and he was referred, he had several denials from several facilities, but agreed to go to [facility].  An IV was established labs were drawn and he was given fluids, he remained stable, I reviewed interpreted alefacept discussed with the patient at the bedside.  He is cleared for transfer to inpatient psych facility.    Problems Addressed:  Methamphetamine abuse: complicated acute illness or injury  Suicidal intent: complicated acute illness or injury    Amount and/or Complexity of Data Reviewed  Labs: ordered. Decision-making details documented in ED Course.  ECG/medicine tests: ordered.    Risk  Prescription drug management.          Final diagnoses:   Methamphetamine abuse   Suicidal intent          Tomer Stallworth Jr., PARichardC  06/04/23 3688

## 2023-06-04 NOTE — CONSULTS
"Stefano Claire  1970      Race/Ethnicity: White or   Martial Status: Single  Guardian Name/Contact/etc: self  Pt Lives With:  a friend in [place]  Occupation: unemployed, disabled  Appearance: disheveled, unkempt       Time Called for Assessment: 10:22    Assessment Start and End: 11:05-11:25    Orientation: alert and oriented to person, place, and time     Is patient agreeable to treatment? Yes    Attention and Cooperation: Impaired and Suspicious    Presenting Problems: Patient reports to the ED and reports he took meth a couple of hours ago and is having SI. Patient reports he is having SI currently and would cut himself with whatever he could find in the room. Security was notified for 1:1. Patient also reports \"there is about to be a fight in here between me and that flash out there that is telling me I have to take tests and shit\". Patient is visually agitated. Patient reports he was recently discharged from [facility] last week following SI and thoughts of fighting his neighbor.     Mood: agitated    Affect: Blunted    Speech: Rapid    Eye Contact: Fleeting    Psychomotor Movement: Aggitated    Depression: 7     Anxiety: 8    Sleep: Poor     Appetite: Poor     Delusions: none displayed during assessment      Hallucinations: Not demonstrated today     Homicidal Ideations: Absent - but does report that he wants to fight a flash in the ED    Current Stressors: chemical dependency/abuse, housing  concerns, inadequate services, and limited support system     Housing Instability and/or Utility Needs: No    Food Insecurity: No    Transportation Needs: No    Established/Current Mental Healthcare/Services: Patient reports he does not see a therapist or psychiatrist.    Current Psychiatric Medications: Patient reports he has not had any medications since being discharged from [facility].      Hx of Psychiatric or Detox Hospitalizations:  Yes, describe: was discharged from [facility] last week and " reports he has been to [facility] in the past.     Most recent inpatient admission: last week       COLUMBIA-SUICIDE SEVERITY RATING SCALE  Psychiatric Inpatient Setting - Discharge Screener    Ask questions that are bold and underlined Discharge   Ask Questions 1 and 2 YES NO   Wish to be Dead:   Person endorses thoughts about a wish to be dead or not alive anymore, or wish to fall asleep and not wake up.  While you were here in the hospital, have you wished you were dead or wished you could go to sleep and not wake up? x    Suicidal Thoughts:   General non-specific thoughts of wanting to end one's life/die by suicide, “I've thought about killing myself” without general thoughts of ways to kill oneself/associated methods, intent, or plan.   While you were here in the hospital, have you actually had thoughts about killing yourself?  x    If YES to 2, ask questions 3, 4, 5, and 6.  If NO to 2, go directly to question 6   3) Suicidal Thoughts with Method (without Specific Plan or Intent to Act):   Person endorses thoughts of suicide and has thought of a least one method during the assessment period. This is different than a specific plan with time, place or method details worked out. “I thought about taking an overdose but I never made a specific plan as to when where or how I would actually do it….and I would never go through with it.”   Have you been thinking about how you might kill yourself?  x    4) Suicidal Intent (without Specific Plan):   Active suicidal thoughts of killing oneself and patient reports having some intent to act on such thoughts, as opposed to “I have the thoughts but I definitely will not do anything about them.”   Have you had these thoughts and had some intention of acting on them or do you have some intention of acting on them after you leave the hospital?  x    5) Suicide Intent with Specific Plan:   Thoughts of killing oneself with details of plan fully or partially worked out and  "person has some intent to carry it out.   Have you started to work out or worked out the details of how to kill yourself either for while you were here in the hospital or for after you leave the hospital? Do you intend to carry out this plan?  x      6) Suicide Behavior    While you were here in the hospital, have you done anything, started to do anything, or prepared to do anything to end your life?    Examples: Took pills, cut yourself, tried to hang yourself, took out pills but didn't swallow any because you changed your mind or someone took them from you, collected pills, secured a means of obtaining a gun, gave away valuables, wrote a will or suicide note, etc.  x     Suicidal: Present - patient reports current SI with a plan to cut his wrist with \"whatever I can find\" while in the hospital. Security was notified, 1:1 ordered.     Previous Attempts:  \"a bunch of times\"    Most Recent Attempt: \"a couple of weeks ago\"     PSYCHOSOCIAL HISTORY    Highest Level of Education: high school diploma/GED     Family Hx of Mental Health/Substance Abuse: No    Patient Trauma/Abuse History: Further details: denies       Does this require reporting: N/A    Legal History / History of Violence: None known     Experience with Interpersonal Violence: No     History of Inappropriate Sexual Behavior: No    SUBSTANCE USE HISTORY: Patient reports he snorted meth a couple of hours ago. He denies any other substance abuse. UDS positive for methamphetamine and amphetamines.         DATA:   This therapist received a call from [facility] staff for a behavioral health consult.  The patient is agreeable to speak with the behavioral health team.  Met with patient at bedside. Patient is under 1:1 security monitoring.  The attending treatment team is JANICE Frey and Tomer MORALES, Provider.    Patient presents today with chief compliant of suicidal ideation and substance abuse.      Patient reports to the ED and reports he took meth a " "couple of hours ago and is having SI. Patient reports he is having SI currently and would cut himself with whatever he could find in the room. Security was notified for 1:1. Patient also reports \"there is about to be a fight in here between me and that flash out there that is telling me I have to take tests and shit\". Patient is visually agitated. Patient reports he was recently discharged from [facility] last week following SI and thoughts of fighting his neighbor.     Safety plan of report to nearest hospital, or call police/911 if feeling unsafe, if having suicidal or homicidal thoughts, or if in emergent need of medications verbally reviewed with patient during assessment and suicide prevention/crisis hotlines verbally reviewed with patient during assessment.  Patient during assessment verbally agreed to safety plan. Patient reports to be agreeable for treatment recommendations.     ASSESSMENT:    Therapist consulted with patient and clinical descriptors are documented above.  Therapist completed CSSRS with patient for suicide risk assessment.  The results of patient’s CSSRS documented above. The patient's displays poor insight, poor impulse control and judgement appears limited.     PLAN:    At this time, therapist recommends inpatient treatment based upon the above assessment.  Therapist collaborated with the treatment team (Tk RN and Tomer MORALES, Provider) who agree to adopt the recommendations.  Therapist discussed recommendations with patient and/or patient support systems, and patient is agreeable to the plan.  Patient is agreeable for referrals to be sent to facilities and agencies for treatment.    DISPOSITION PLAN TIMELINE:    11:43 - Therapist contacted [facility]ster [facility] to present case.     13:35- Therapist followed up with Ester at [facility] and case has not been reviewed yet.     13:38 - Therapist sent the referral to [facility], [facility], and [facility].     14:25 - Per Ester at [facility], " patient has been denied admission by Dr. Santana as he feels patients needs could be better met at a rehab facility.     14:35 - Hamilton from [facility] called and patient was denied by physician.     14:50 - Patient denied by [facility].     15:00 - Direct to [facility] ETA: 15:45. Faxed note to [facility]. JANICE Frey to call report.     Deena Urban, YAZMINW, MSW

## 2023-06-04 NOTE — ED NOTES
Notified at this time that patient has states he is now SI. Removed items and patient belongings from room. Security is at bedside.

## 2023-08-10 ENCOUNTER — HOSPITAL ENCOUNTER (OUTPATIENT)
Dept: CT IMAGING | Facility: HOSPITAL | Age: 53
Discharge: HOME | End: 2023-08-10
Admitting: STUDENT IN AN ORGANIZED HEALTH CARE EDUCATION/TRAINING PROGRAM
Payer: COMMERCIAL

## 2023-08-10 DIAGNOSIS — R91.1 LUNG NODULE: ICD-10-CM

## 2023-08-10 LAB — CREAT BLDA-MCNC: 1.1 MG/DL (ref 0.6–1.3)

## 2023-08-10 PROCEDURE — 82565 ASSAY OF CREATININE: CPT

## 2023-08-10 PROCEDURE — 25510000001 IOPAMIDOL 61 % SOLUTION: Performed by: STUDENT IN AN ORGANIZED HEALTH CARE EDUCATION/TRAINING PROGRAM

## 2023-08-10 PROCEDURE — 71260 CT THORAX DX C+: CPT

## 2023-08-10 RX ADMIN — IOPAMIDOL 85 ML: 612 INJECTION, SOLUTION INTRAVENOUS at 13:15

## 2023-08-14 RX ORDER — AMOXICILLIN AND CLAVULANATE POTASSIUM 875; 125 MG/1; MG/1
1 TABLET, FILM COATED ORAL 2 TIMES DAILY
Qty: 14 TABLET | Refills: 0 | Status: SHIPPED | OUTPATIENT
Start: 2023-08-14

## 2023-08-14 RX ORDER — AZITHROMYCIN 250 MG/1
TABLET, FILM COATED ORAL
Qty: 6 TABLET | Refills: 0 | Status: SHIPPED | OUTPATIENT
Start: 2023-08-14

## 2023-08-14 NOTE — PROGRESS NOTES
Please call the patient to let him know that his ct scan shows that his lung nodule resolved but incidentally he has what appears to be pneumonia. I have called him in antibiotics. Please have him schedule a visit with me right away to discuss this.

## 2023-08-17 ENCOUNTER — TELEPHONE (OUTPATIENT)
Dept: FAMILY MEDICINE CLINIC | Facility: CLINIC | Age: 53
End: 2023-08-17
Payer: COMMERCIAL

## 2023-08-17 NOTE — TELEPHONE ENCOUNTER
PATIENT CALLED IN I RELAYED THE HUB TO READ MESSAGE THE PATIENT MADE AN APPOINTMENT FOR 08/18/2023

## 2023-08-17 NOTE — PROGRESS NOTES
Called Stefano [facility] for pt to call back.    Left encounter for hub/front to read

## 2023-08-17 NOTE — TELEPHONE ENCOUNTER
Hub/front can read     ----- Message from Esther Ellis DO sent at 8/14/2023 10:59 AM EDT -----  Please call the patient to let him know that his ct scan shows that his lung nodule resolved but incidentally he has what appears to be pneumonia. I have called him in antibiotics. Please have him schedule a visit with me right away to discuss this.

## 2023-08-18 ENCOUNTER — OFFICE VISIT (OUTPATIENT)
Dept: FAMILY MEDICINE CLINIC | Facility: CLINIC | Age: 53
End: 2023-08-18
Payer: COMMERCIAL

## 2023-08-18 ENCOUNTER — LAB (OUTPATIENT)
Dept: LAB | Facility: HOSPITAL | Age: 53
End: 2023-08-18
Payer: COMMERCIAL

## 2023-08-18 VITALS
DIASTOLIC BLOOD PRESSURE: 80 MMHG | BODY MASS INDEX: 20.63 KG/M2 | SYSTOLIC BLOOD PRESSURE: 118 MMHG | HEIGHT: 66 IN | RESPIRATION RATE: 18 BRPM | WEIGHT: 128.4 LBS | HEART RATE: 81 BPM | OXYGEN SATURATION: 100 % | TEMPERATURE: 99.1 F

## 2023-08-18 DIAGNOSIS — Z00.00 ANNUAL PHYSICAL EXAM: ICD-10-CM

## 2023-08-18 DIAGNOSIS — R05.9 COUGH, UNSPECIFIED TYPE: Primary | ICD-10-CM

## 2023-08-18 DIAGNOSIS — R79.89 ELEVATED SERUM CREATININE: ICD-10-CM

## 2023-08-18 LAB
DEPRECATED RDW RBC AUTO: 40.6 FL (ref 37–54)
ERYTHROCYTE [DISTWIDTH] IN BLOOD BY AUTOMATED COUNT: 13 % (ref 12.3–15.4)
HCT VFR BLD AUTO: 43.2 % (ref 37.5–51)
HGB BLD-MCNC: 14.6 G/DL (ref 13–17.7)
MCH RBC QN AUTO: 29.6 PG (ref 26.6–33)
MCHC RBC AUTO-ENTMCNC: 33.8 G/DL (ref 31.5–35.7)
MCV RBC AUTO: 87.4 FL (ref 79–97)
PLATELET # BLD AUTO: 421 10*3/MM3 (ref 140–450)
PMV BLD AUTO: 9 FL (ref 6–12)
RBC # BLD AUTO: 4.94 10*6/MM3 (ref 4.14–5.8)
WBC NRBC COR # BLD: 6.12 10*3/MM3 (ref 3.4–10.8)

## 2023-08-18 PROCEDURE — 99214 OFFICE O/P EST MOD 30 MIN: CPT | Performed by: STUDENT IN AN ORGANIZED HEALTH CARE EDUCATION/TRAINING PROGRAM

## 2023-08-18 PROCEDURE — 82306 VITAMIN D 25 HYDROXY: CPT

## 2023-08-18 PROCEDURE — 80061 LIPID PANEL: CPT

## 2023-08-18 PROCEDURE — 85027 COMPLETE CBC AUTOMATED: CPT

## 2023-08-18 PROCEDURE — 84443 ASSAY THYROID STIM HORMONE: CPT

## 2023-08-18 PROCEDURE — 82607 VITAMIN B-12: CPT

## 2023-08-18 PROCEDURE — 36415 COLL VENOUS BLD VENIPUNCTURE: CPT

## 2023-08-18 PROCEDURE — 80053 COMPREHEN METABOLIC PANEL: CPT

## 2023-08-18 PROCEDURE — 3079F DIAST BP 80-89 MM HG: CPT | Performed by: STUDENT IN AN ORGANIZED HEALTH CARE EDUCATION/TRAINING PROGRAM

## 2023-08-18 PROCEDURE — 3074F SYST BP LT 130 MM HG: CPT | Performed by: STUDENT IN AN ORGANIZED HEALTH CARE EDUCATION/TRAINING PROGRAM

## 2023-08-18 RX ORDER — ALBUTEROL SULFATE 90 UG/1
2 AEROSOL, METERED RESPIRATORY (INHALATION) EVERY 4 HOURS PRN
Qty: 1 G | Refills: 0 | Status: SHIPPED | OUTPATIENT
Start: 2023-08-18

## 2023-08-18 RX ORDER — METHYLPREDNISOLONE 4 MG/1
TABLET ORAL
Qty: 21 TABLET | Refills: 0 | Status: SHIPPED | OUTPATIENT
Start: 2023-08-18

## 2023-08-18 NOTE — PROGRESS NOTES
"Chief Complaint  Results    History of Present Illness    He says he was coughing before taking the antibiotics. He is unsure how many days he has taken the antibiotics. He says the cough is unchanged.   No sob. The cough is dry. He denies any fever. He denies sore throat or body aches. No ear pain. He is without any other complaint.    The following portions of the patient's history were reviewed and updated as appropriate: allergies, current medications, past family history, past medical history, past social history, past surgical history, and problem list.    OBJECTIVE:  /80   Pulse 81   Temp 99.1 øF (37.3 øC) (Temporal)   Resp 18   Ht 167.6 cm (65.98\")   Wt 58.2 kg (128 lb 6.4 oz)   SpO2 100%   BMI 20.73 kg/mý       Physical Exam  Constitutional:       General: He is not in acute distress.     Appearance: Normal appearance.   HENT:      Head: Normocephalic and atraumatic.   Eyes:      Extraocular Movements: Extraocular movements intact.   Cardiovascular:      Rate and Rhythm: Normal rate and regular rhythm.      Heart sounds: No murmur heard.  Pulmonary:      Effort: Pulmonary effort is normal. No respiratory distress.      Breath sounds: No stridor. Rhonchi (rhonchi in right lower lobe) present. No wheezing or rales.   Skin:     Findings: No rash.   Neurological:      General: No focal deficit present.      Mental Status: He is alert.   Psychiatric:         Mood and Affect: Mood normal.                  Assessment and Plan   Diagnoses and all orders for this visit:    1. Cough, unspecified type (Primary)  -     XR Chest PA & Lateral (In Office)    Other orders  -     methylPREDNISolone (MEDROL) 4 MG dose pack; Take as directed on package instructions.  Dispense: 21 tablet; Refill: 0  -     albuterol sulfate  (90 Base) MCG/ACT inhaler; Inhale 2 puffs Every 4 (Four) Hours As Needed for Wheezing.  Dispense: 1 g; Refill: 0          Advised him to go to lab for bloodwork (ordered last visit) and " for check xray .   He is unsure what day of antibiotic he is on, continue.   Given smoking history and rhonchi right lower quadrant I am worried about copd exacerbation. Will give steroid. Counseled on mood changes on steroid.   Will follow up in a week. Advised him to follow up sooner for any new symptoms.         Return in about 1 week (around 8/25/2023).       Esther Ellis D.O.  [facility]

## 2023-08-19 LAB
25(OH)D3 SERPL-MCNC: 39.8 NG/ML (ref 30–100)
ALBUMIN SERPL-MCNC: 4.4 G/DL (ref 3.5–5.2)
ALBUMIN/GLOB SERPL: 1.6 G/DL
ALP SERPL-CCNC: 91 U/L (ref 39–117)
ALT SERPL W P-5'-P-CCNC: 22 U/L (ref 1–41)
ANION GAP SERPL CALCULATED.3IONS-SCNC: 14.7 MMOL/L (ref 5–15)
AST SERPL-CCNC: 25 U/L (ref 1–40)
BILIRUB SERPL-MCNC: 0.2 MG/DL (ref 0–1.2)
BUN SERPL-MCNC: 12 MG/DL (ref 6–20)
BUN/CREAT SERPL: 11.9 (ref 7–25)
CALCIUM SPEC-SCNC: 9.5 MG/DL (ref 8.6–10.5)
CHLORIDE SERPL-SCNC: 100 MMOL/L (ref 98–107)
CHOLEST SERPL-MCNC: 175 MG/DL (ref 0–200)
CO2 SERPL-SCNC: 21.3 MMOL/L (ref 22–29)
CREAT SERPL-MCNC: 1.01 MG/DL (ref 0.76–1.27)
EGFRCR SERPLBLD CKD-EPI 2021: 90.6 ML/MIN/1.73
GLOBULIN UR ELPH-MCNC: 2.7 GM/DL
GLUCOSE SERPL-MCNC: 71 MG/DL (ref 65–99)
HDLC SERPL-MCNC: 54 MG/DL (ref 40–60)
LDLC SERPL CALC-MCNC: 102 MG/DL (ref 0–100)
LDLC/HDLC SERPL: 1.86 {RATIO}
POTASSIUM SERPL-SCNC: 4.7 MMOL/L (ref 3.5–5.2)
PROT SERPL-MCNC: 7.1 G/DL (ref 6–8.5)
SODIUM SERPL-SCNC: 136 MMOL/L (ref 136–145)
TRIGL SERPL-MCNC: 104 MG/DL (ref 0–150)
TSH SERPL DL<=0.05 MIU/L-ACNC: 1.14 UIU/ML (ref 0.27–4.2)
VIT B12 BLD-MCNC: 594 PG/ML (ref 211–946)
VLDLC SERPL-MCNC: 19 MG/DL (ref 5–40)

## 2023-12-22 ENCOUNTER — OFFICE VISIT (OUTPATIENT)
Dept: FAMILY MEDICINE CLINIC | Facility: CLINIC | Age: 53
End: 2023-12-22
Payer: COMMERCIAL

## 2023-12-22 VITALS
TEMPERATURE: 97.8 F | WEIGHT: 127.8 LBS | HEIGHT: 66 IN | SYSTOLIC BLOOD PRESSURE: 100 MMHG | RESPIRATION RATE: 19 BRPM | HEART RATE: 90 BPM | OXYGEN SATURATION: 98 % | BODY MASS INDEX: 20.54 KG/M2 | DIASTOLIC BLOOD PRESSURE: 64 MMHG

## 2023-12-22 DIAGNOSIS — Z72.0 TOBACCO ABUSE: ICD-10-CM

## 2023-12-22 DIAGNOSIS — I10 ESSENTIAL HYPERTENSION: ICD-10-CM

## 2023-12-22 DIAGNOSIS — F15.20 METHAMPHETAMINE USE DISORDER, SEVERE: Primary | ICD-10-CM

## 2023-12-22 PROBLEM — R29.6 FREQUENT FALLS: Status: RESOLVED | Noted: 2021-02-27 | Resolved: 2023-12-22

## 2023-12-22 PROBLEM — R33.8 ACUTE URINARY RETENTION: Status: RESOLVED | Noted: 2022-05-11 | Resolved: 2023-12-22

## 2023-12-22 PROBLEM — N17.9 ACUTE KIDNEY INJURY: Status: RESOLVED | Noted: 2022-05-11 | Resolved: 2023-12-22

## 2023-12-22 PROBLEM — E87.1 HYPONATREMIA: Status: RESOLVED | Noted: 2021-05-06 | Resolved: 2023-12-22

## 2023-12-22 PROBLEM — F29 PSYCHOSIS: Status: RESOLVED | Noted: 2021-04-25 | Resolved: 2023-12-22

## 2023-12-22 RX ORDER — TAMSULOSIN HYDROCHLORIDE 0.4 MG/1
CAPSULE ORAL
COMMUNITY
Start: 2023-10-02

## 2023-12-22 NOTE — PROGRESS NOTES
"Chief Complaint  Hypertension    History of Present Illness      He says \"everything is alright today\". He is without complaints.     Meth use  He says he continues to use drugs, specifically says last use was last week. He has cut back he notes. He says he has been staying with a friend.       Htn  On low dose amlodipine given spikes of htn when using meth. He says he took it today and doesn't feel dizzy on it.       Tobacco use  Chews tobacco 2 can per day.       The following portions of the patient's history were reviewed and updated as appropriate: allergies, current medications, past family history, past medical history, past social history, past surgical history, and problem list.    OBJECTIVE:  /64   Pulse 90   Temp 97.8 °F (36.6 °C) (Infrared)   Resp 19   Ht 167.6 cm (65.98\")   Wt 58 kg (127 lb 12.8 oz)   SpO2 98%   BMI 20.64 kg/m²       Physical Exam               Assessment and Plan   Diagnoses and all orders for this visit:    1. Methamphetamine use disorder, severe (Primary)    2. Essential hypertension    3. Tobacco abuse    Other orders  -     Fluzone >6 Months (1100-1257)        Meth use  Counseled on cessation.   He says he still goes to [facility] for behavioral health.   Will request records.   I have referred him to addiction medicine.   He denies being sexually active.     Tobacco use  Counseled on cessation to avoid oral cancers.   He will consider nicotine replacement.     Frequent episodes of urinary retention  No symptoms today  Gave him the number to schedule with  for continued follow up  He says he still takes his flomax.     Advised him to call and schedule with genetics when he has transport. He has been referred to social care to help with transport.     He denies needing refills on anything.     Return in about 6 months (around 6/22/2024).       Esther Ellis D.O.  [facility]   "